# Patient Record
Sex: MALE | Race: WHITE | NOT HISPANIC OR LATINO | Employment: OTHER | URBAN - METROPOLITAN AREA
[De-identification: names, ages, dates, MRNs, and addresses within clinical notes are randomized per-mention and may not be internally consistent; named-entity substitution may affect disease eponyms.]

---

## 2017-03-08 ENCOUNTER — OFFICE VISIT (OUTPATIENT)
Dept: INTERNAL MEDICINE | Facility: CLINIC | Age: 67
End: 2017-03-08

## 2017-03-08 VITALS
BODY MASS INDEX: 35.79 KG/M2 | WEIGHT: 250 LBS | SYSTOLIC BLOOD PRESSURE: 148 MMHG | DIASTOLIC BLOOD PRESSURE: 86 MMHG | RESPIRATION RATE: 18 BRPM | HEART RATE: 73 BPM | OXYGEN SATURATION: 99 % | HEIGHT: 70 IN

## 2017-03-08 DIAGNOSIS — G47.33 OBSTRUCTIVE SLEEP APNEA SYNDROME: ICD-10-CM

## 2017-03-08 DIAGNOSIS — Z12.11 COLON CANCER SCREENING: ICD-10-CM

## 2017-03-08 DIAGNOSIS — Z00.00 MEDICARE ANNUAL WELLNESS VISIT, INITIAL: Primary | ICD-10-CM

## 2017-03-08 DIAGNOSIS — E78.2 MIXED HYPERLIPIDEMIA: ICD-10-CM

## 2017-03-08 DIAGNOSIS — Z11.59 NEED FOR HEPATITIS C SCREENING TEST: ICD-10-CM

## 2017-03-08 DIAGNOSIS — I10 ESSENTIAL HYPERTENSION: ICD-10-CM

## 2017-03-08 PROCEDURE — G0438 PPPS, INITIAL VISIT: HCPCS | Performed by: INTERNAL MEDICINE

## 2017-03-08 PROCEDURE — 99214 OFFICE O/P EST MOD 30 MIN: CPT | Performed by: INTERNAL MEDICINE

## 2017-03-08 RX ORDER — VERAPAMIL HYDROCHLORIDE 100 MG/1
CAPSULE, EXTENDED RELEASE ORAL
COMMUNITY
Start: 2017-02-21 | End: 2017-08-07 | Stop reason: SDUPTHER

## 2017-03-08 RX ORDER — IBUPROFEN 400 MG/1
400 TABLET ORAL EVERY 6 HOURS
COMMUNITY
End: 2018-05-07

## 2017-03-08 RX ORDER — EPINEPHRINE 0.3 MG/.3ML
0.3 INJECTION SUBCUTANEOUS
COMMUNITY
Start: 2015-12-17 | End: 2017-04-19 | Stop reason: SDUPTHER

## 2017-03-08 RX ORDER — VALSARTAN 40 MG/1
40 TABLET ORAL DAILY
Qty: 90 TABLET | Refills: 3 | Status: SHIPPED | OUTPATIENT
Start: 2017-03-08 | End: 2018-01-30 | Stop reason: SDUPTHER

## 2017-03-08 RX ORDER — ATORVASTATIN CALCIUM 20 MG/1
TABLET, FILM COATED ORAL
COMMUNITY
Start: 2016-12-05 | End: 2017-04-19 | Stop reason: SDUPTHER

## 2017-03-08 RX ORDER — RANITIDINE 300 MG/1
300 TABLET ORAL NIGHTLY
COMMUNITY
Start: 2017-01-16 | End: 2020-05-18

## 2017-03-08 RX ORDER — SILDENAFIL 100 MG/1
100 TABLET, FILM COATED ORAL AS NEEDED
COMMUNITY
Start: 2014-09-03

## 2017-03-08 RX ORDER — MULTIVITAMIN
1 CAPSULE ORAL DAILY
COMMUNITY
End: 2018-08-23

## 2017-03-08 RX ORDER — FEXOFENADINE HCL 180 MG/1
180 TABLET ORAL DAILY
COMMUNITY

## 2017-03-08 NOTE — PROGRESS NOTES
"Medicare Annual Wellness Visit    Chief Complaint:  Annual Exam (Initial AWV); Hypertension; and Hyperlipidemia      History of Present Illness    Sachin Almanzar is a 66 y.o. male who presents for an Annual Wellness Visit.  He is also here to establish care.  He was formerly in the [facility] system and his wife Julita is my patient.     C-scope: 12/17/12 due 2017  Dental Exam: 4 months ago.  He has an appt this afternoon  Eye Exam:  Appt in May 2017 scheduled  Exercise: \"not very much\".  He walks a couple of times a week.    Advanced Care Planning:  has an advanced directive - a copy HAS NOT been provided   Immunization History   Administered Date(s) Administered   • Influenza Split High Dose Preservative Free IM 10/02/2015, 09/23/2016   • Pneumococcal Conjugate 13-Valent 07/06/2016   • Pneumococcal Polysaccharide 08/28/2013   • Td, Not Adsorbed 01/01/2002   • Tdap 02/25/2013   • Zoster 12/18/2009     Sachin has a hx of HTN, SVT, HLD, HE (on cpap).  He sees his urologist Dr Muniz q 6 months due to a hx of bladder cancer 5 years ago.  .  He knows he needs to work on his weight.  He had done WW in the past with good success.  He has not tried using My Fitness Pal.  He denies any issues with cp or palp or dizziness.  He does check his bp and has noted that the SBP is in the 140-150 range most of the time.  The only other med he was on other than verapamil was something that gave him a cough.  He was started on verapamil initially due to an instance of SVT that occurred after consuming a half a gallon of coffee in a day.  We reviewed all of his labs from December at [facility].      Review of Systems   Constitutional: Negative for chills, fatigue and fever.   HENT: Positive for hearing loss (mild). Negative for sore throat, tinnitus, trouble swallowing and voice change.    Eyes: Negative for visual disturbance.   Respiratory: Negative for cough and shortness of breath.    Cardiovascular: Positive for leg swelling (he wears " compression socks daily). Negative for chest pain and palpitations.   Gastrointestinal: Negative for abdominal distention, abdominal pain, anal bleeding, blood in stool, constipation, diarrhea, nausea and vomiting.   Endocrine: Negative for polydipsia and polyuria.   Genitourinary: Negative for decreased urine volume, dysuria, flank pain and hematuria.   Musculoskeletal: Negative for arthralgias and myalgias.   Skin: Negative for rash.   Neurological: Negative for dizziness, syncope, light-headedness and headaches.   Hematological: Negative for adenopathy. Does not bruise/bleed easily.   Psychiatric/Behavioral: Negative for confusion and dysphoric mood. The patient is not nervous/anxious.        Past Medical History   Diagnosis Date   • Basal cell carcinoma of skin 7/10/2012     Overview:  NOSE   • Malignant neoplasm of urinary bladder 7/10/2012     Overview:  3/2011, Dr. Muniz, TURBT, BCG and infusion of chemo   • Melanoma in situ of face 2016     Overview:  Removed per Dr. Steen (derm) on 2016       No past surgical history on file.    Social History     Social History   • Marital status:      Spouse name: Julita   • Number of children: 1   • Years of education: N/A     Occupational History   • retired president of [employer]      Social History Main Topics   • Smoking status: Never Smoker   • Smokeless tobacco: Not on file   • Alcohol use Yes      Comment: 2-3 beers 5 days a week   • Drug use: No   • Sexual activity: Not on file     Other Topics Concern   • Not on file     Social History Narrative   • No narrative on file       Family History   Problem Relation Age of Onset   • Heart attack Mother       age 55   • Cancer Father       at age 44   • Diabetes Maternal Grandmother    • Hypertension Paternal Uncle        Allergies   Allergen Reactions   • Ace Inhibitors    • Iodine Nausea And Vomiting   • Shellfish-Derived Products Hives     Throat swelling, itching       No current outpatient  "prescriptions on file prior to visit.     No current facility-administered medications on file prior to visit.        Patient Active Problem List   Diagnosis   • Atopic rhinitis   • Impotence of organic origin   • Gastroesophageal reflux disease   • Transient acantholytic dermatosis   • History of colonic polyps   • Hyperlipidemia   • Hypertension   • Calculus of kidney   • Adult body mass index greater than 30   • Obstructive sleep apnea syndrome   • Supraventricular tachycardia   • Venous insufficiency of lower extremity       Health Risk Assessment/Depression Screen/Functional and Cognitive Screening:   The patient has completed a Health Risk Assessment & Depression screen. These have been reviewed with them and have been scanned as a separate documents.    Age-appropriate Screening Schedule:  Refer to the list below for future screening recommendations based on patient's age. Orders for these recommended tests are listed in the plan section. The patient has been provided with a written plan.     Vitals:    03/08/17 0830   BP: 148/86   Pulse: 73   Resp: 18   SpO2: 99%   Weight: 250 lb (113 kg)   Height: 70\" (177.8 cm)       Body mass index is 35.87 kg/(m^2).    Physical Exam    No results found for this or any previous visit.    Assessment & Plan:    Diagnoses and all orders for this visit:    Medicare annual wellness visit, initial    Colon cancer screening  -     Ambulatory Referral to General Surgery    Need for hepatitis C screening test  -     Hepatitis C Antibody    Mixed hyperlipidemia    Essential hypertension    Obstructive sleep apnea syndrome    Other orders  -     atorvastatin (LIPITOR) 20 MG tablet;   -     verapamil (VERELAN) 100 MG 24 hr capsule;   -     raNITIdine (ZANTAC) 300 MG tablet;   -     Multiple Vitamin (MULTIVITAMIN) capsule; Take 1 capsule by mouth.  -     ibuprofen (ADVIL,MOTRIN) 400 MG tablet; Take 400 mg by mouth Every 6 (Six) Hours.  -     fexofenadine (ALLEGRA) 180 MG tablet; Take " 180 mg by mouth.  -     EPINEPHrine (EPIPEN 2-ANALI) 0.3 MG/0.3ML solution auto-injector injection; Inject 0.3 mg into the shoulder, thigh, or buttocks.  -     sildenafil (VIAGRA) 100 MG tablet;   -     valsartan (DIOVAN) 40 MG tablet; Take 1 tablet by mouth Daily.      Discussion/Summary  Sachin is a very nice 67 y/o patient here to establish care.  I cannot see from his [place] records that he has had an AWV ever, so this will be his initial.  He is very well up to date on health maintenance.  His lipids are well controlled.  His bp needs adjusting.  I have added diovan 40 mg today.  He is going to make a good effort to get his weight under control.  He is going to try My Fitness Pal or consider going back to WW.  We set a ten lb weight loss goal for when he sees me back in 6 weeks.  He will be due at the end of this year for c-scope.  Referral placed.      Follow Up:  Return in about 6 weeks (around 4/19/2017).     An After Visit Summary and PPPS with all of these plans were given to the patient.     Current Outpatient Prescriptions:   •  atorvastatin (LIPITOR) 20 MG tablet, , Disp: , Rfl:   •  EPINEPHrine (EPIPEN 2-ANALI) 0.3 MG/0.3ML solution auto-injector injection, Inject 0.3 mg into the shoulder, thigh, or buttocks., Disp: , Rfl:   •  fexofenadine (ALLEGRA) 180 MG tablet, Take 180 mg by mouth., Disp: , Rfl:   •  ibuprofen (ADVIL,MOTRIN) 400 MG tablet, Take 400 mg by mouth Every 6 (Six) Hours., Disp: , Rfl:   •  Multiple Vitamin (MULTIVITAMIN) capsule, Take 1 capsule by mouth., Disp: , Rfl:   •  raNITIdine (ZANTAC) 300 MG tablet, , Disp: , Rfl:   •  sildenafil (VIAGRA) 100 MG tablet, , Disp: , Rfl:   •  verapamil (VERELAN) 100 MG 24 hr capsule, , Disp: , Rfl:   •  valsartan (DIOVAN) 40 MG tablet, Take 1 tablet by mouth Daily., Disp: 90 tablet, Rfl: 3

## 2017-03-09 ENCOUNTER — TELEPHONE (OUTPATIENT)
Dept: INTERNAL MEDICINE | Facility: CLINIC | Age: 67
End: 2017-03-09

## 2017-03-09 LAB — HCV AB S/CO SERPL IA: 0.2 S/CO RATIO (ref 0–0.9)

## 2017-03-09 NOTE — TELEPHONE ENCOUNTER
----- Message from Hali Potts MD sent at 3/9/2017  9:35 AM EST -----  Please call - his hep c screening is neg

## 2017-04-19 ENCOUNTER — OFFICE VISIT (OUTPATIENT)
Dept: INTERNAL MEDICINE | Facility: CLINIC | Age: 67
End: 2017-04-19

## 2017-04-19 VITALS
SYSTOLIC BLOOD PRESSURE: 132 MMHG | WEIGHT: 248 LBS | BODY MASS INDEX: 35.5 KG/M2 | DIASTOLIC BLOOD PRESSURE: 74 MMHG | RESPIRATION RATE: 18 BRPM | HEIGHT: 70 IN | HEART RATE: 80 BPM | OXYGEN SATURATION: 99 %

## 2017-04-19 DIAGNOSIS — I10 ESSENTIAL HYPERTENSION: Primary | ICD-10-CM

## 2017-04-19 DIAGNOSIS — N20.0 CALCULUS OF KIDNEY: ICD-10-CM

## 2017-04-19 DIAGNOSIS — IMO0002 ADULT BODY MASS INDEX GREATER THAN 30: ICD-10-CM

## 2017-04-19 DIAGNOSIS — Z91.013 ALLERGY TO SHELLFISH: ICD-10-CM

## 2017-04-19 LAB
BUN SERPL-MCNC: 13 MG/DL (ref 8–23)
BUN/CREAT SERPL: 13.3 (ref 7–25)
CALCIUM SERPL-MCNC: 9.7 MG/DL (ref 8.6–10.5)
CHLORIDE SERPL-SCNC: 101 MMOL/L (ref 98–107)
CO2 SERPL-SCNC: 24.7 MMOL/L (ref 22–29)
CREAT SERPL-MCNC: 0.98 MG/DL (ref 0.76–1.27)
GLUCOSE SERPL-MCNC: 98 MG/DL (ref 65–99)
POTASSIUM SERPL-SCNC: 4.6 MMOL/L (ref 3.5–5.2)
SODIUM SERPL-SCNC: 141 MMOL/L (ref 136–145)

## 2017-04-19 PROCEDURE — 99214 OFFICE O/P EST MOD 30 MIN: CPT | Performed by: INTERNAL MEDICINE

## 2017-04-19 RX ORDER — EPINEPHRINE 0.3 MG/.3ML
0.3 INJECTION SUBCUTANEOUS ONCE
Qty: 1 EACH | Refills: 3 | Status: SHIPPED | OUTPATIENT
Start: 2017-04-19 | End: 2017-04-19

## 2017-04-19 RX ORDER — ATORVASTATIN CALCIUM 20 MG/1
20 TABLET, FILM COATED ORAL NIGHTLY
Qty: 90 TABLET | Refills: 3 | Status: SHIPPED | OUTPATIENT
Start: 2017-04-19 | End: 2018-04-16 | Stop reason: SDUPTHER

## 2017-04-19 NOTE — PROGRESS NOTES
Chief Complaint  Sachin Almanzar is a 66 y.o. male who presents for Weight Loss; Follow-up; and Hypertension (Diovan started at last appt)  .    History of Present Illness   HTN - No cp or palp or dizziness or soa.  He has been checking his bp daily with a wrist cuff.  His SBP is in the 130's.  DBP in the 70's.      Obesity - He is using My Fitness Pal.  He really likes it.  It's been very eye opening.  He initially lost 5 lbs.  He does have a few beers at night which is pushing his total daily calories over the 1500 karley limit.  He knows he needs to decrease this.  We discussed that this will also help his bp.      HLD - he needs a refill on his [facility]      Kidney stones - he is following with Dr. Muniz. He is pretty sure these are calcium oxalate stones.  He has not been given a dietary handout on these stones.      He needs a refill on his Epi pen for his shellfish allergy.  Wants to know if I can send this in.  Thankfully, he has never needed to use the epi-pen.       Review of Systems   Constitutional: Negative for fatigue and unexpected weight change.   Respiratory: Negative for chest tightness and shortness of breath.    Cardiovascular: Negative for chest pain, palpitations and leg swelling.   Allergic/Immunologic: Positive for food allergies (shellfish).   Neurological: Negative for dizziness and light-headedness.       Patient Active Problem List   Diagnosis   • Atopic rhinitis   • Impotence of organic origin   • Gastroesophageal reflux disease   • Transient acantholytic dermatosis   • History of colonic polyps   • Hyperlipidemia   • Hypertension   • Calculus of kidney   • Adult body mass index greater than 30   • Obstructive sleep apnea syndrome   • Supraventricular tachycardia   • Venous insufficiency of lower extremity   • Allergy to shellfish       Past Medical History:   Diagnosis Date   • Basal cell carcinoma of skin 7/10/2012    Overview:  NOSE   • Malignant neoplasm  of urinary bladder 7/10/2012    Overview:  3/2011, Dr. Muniz, TURBT, BCG and infusion of chemo   • Melanoma in situ of face 2016    Overview:  Removed per Dr. Steen (derm) on 2016       Past Surgical History:   Procedure Laterality Date   • APPENDECTOMY     • COSMETIC SURGERY      Nose - melanoma removal   • TONSILLECTOMY         Family History   Problem Relation Age of Onset   • Heart attack Mother       age 55   • Cancer Father       at age 44   • Diabetes Maternal Grandmother    • Hypertension Paternal Uncle        Social History     Social History   • Marital status:      Spouse name: Julita   • Number of children: 1   • Years of education: N/A     Occupational History   • retired president of [employer]      Social History Main Topics   • Smoking status: Never Smoker   • Smokeless tobacco: Not on file   • Alcohol use Yes      Comment: 2-3 beers 5 days a week   • Drug use: No   • Sexual activity: Not on file     Other Topics Concern   • Not on file     Social History Narrative   • No narrative on file       Current Outpatient Prescriptions on File Prior to Visit   Medication Sig Dispense Refill   • fexofenadine (ALLEGRA) 180 MG tablet Take 180 mg by mouth.     • ibuprofen (ADVIL,MOTRIN) 400 MG tablet Take 400 mg by mouth Every 6 (Six) Hours.     • Multiple Vitamin (MULTIVITAMIN) capsule Take 1 capsule by mouth.     • raNITIdine (ZANTAC) 300 MG tablet      • sildenafil (VIAGRA) 100 MG tablet      • valsartan (DIOVAN) 40 MG tablet Take 1 tablet by mouth Daily. 90 tablet 3   • verapamil (VERELAN) 100 MG 24 hr capsule      • [DISCONTINUED] atorvastatin (LIPITOR) 20 MG tablet      • [DISCONTINUED] EPINEPHrine (EPIPEN 2-ANALI) 0.3 MG/0.3ML solution auto-injector injection Inject 0.3 mg into the shoulder, thigh, or buttocks.       No current facility-administered medications on file prior to visit.        Allergies   Allergen Reactions   • Ace Inhibitors    • Iodine Nausea And Vomiting   •  "Shellfish-Derived Products Hives     Throat swelling, itching       Vitals:    04/19/17 1041   BP: 132/74   Pulse: 80   Resp: 18   SpO2: 99%   Weight: 248 lb (112 kg)   Height: 70\" (177.8 cm)       Body mass index is 35.58 kg/(m^2).    Objective   Physical Exam   Constitutional: He is oriented to person, place, and time. He appears well-developed and well-nourished. No distress.   HENT:   Head: Normocephalic and atraumatic.   Eyes: Conjunctivae are normal. No scleral icterus.   Neck: Normal range of motion. Neck supple.   Cardiovascular: Normal rate, regular rhythm and normal heart sounds.  Exam reveals no gallop and no friction rub.    No murmur heard.  Pulmonary/Chest: Effort normal and breath sounds normal. No respiratory distress. He has no wheezes. He has no rales.   Musculoskeletal: Normal range of motion. He exhibits no edema.   Lymphadenopathy:     He has no cervical adenopathy.   Neurological: He is alert and oriented to person, place, and time. No cranial nerve deficit.   Psychiatric: He has a normal mood and affect. His behavior is normal. Judgment and thought content normal.       Results for orders placed or performed in visit on 03/08/17   Hepatitis C Antibody   Result Value Ref Range    Hep C Virus Ab 0.2 0.0 - 0.9 s/co ratio       Assessment/Plan   Diagnoses and all orders for this visit:    Essential hypertension  -     Basic Metabolic Panel    Adult body mass index greater than 30    Calculus of kidney    Allergy to shellfish    Other orders  -     atorvastatin (LIPITOR) 20 MG tablet; Take 1 tablet by mouth Every Night.  -     EPINEPHrine (EPIPEN 2-ANALI) 0.3 MG/0.3ML solution auto-injector injection; Inject 0.3 mL into the shoulder, thigh, or buttocks 1 (One) Time for 1 dose.        Discussion/Summary  Sachin is here for follow up from his last visit.  We started him on diovan which has certainly helped his bp.  Check BMP today.  I have encouraged him to continue using My Fitness Pal and to really " reduce the alcohol.  I have also given him our handout on dietary guidelines for reducing risk for kidney stones.  [facility]   Return in about 4 months (around 8/19/2017) for Next scheduled follow up, with nonfasting labs prior.

## 2017-04-20 ENCOUNTER — TELEPHONE (OUTPATIENT)
Dept: INTERNAL MEDICINE | Facility: CLINIC | Age: 67
End: 2017-04-20

## 2017-08-07 ENCOUNTER — TELEPHONE (OUTPATIENT)
Dept: INTERNAL MEDICINE | Facility: CLINIC | Age: 67
End: 2017-08-07

## 2017-08-07 RX ORDER — VERAPAMIL HYDROCHLORIDE 100 MG/1
200 CAPSULE, EXTENDED RELEASE ORAL 2 TIMES DAILY
Qty: 360 CAPSULE | Refills: 3 | Status: SHIPPED | OUTPATIENT
Start: 2017-08-07 | End: 2017-08-14 | Stop reason: SDUPTHER

## 2017-08-07 NOTE — TELEPHONE ENCOUNTER
Pt sent [employer] message requesting refill. Had not had this refilled by  yet, he is a new pt, but has been seen by [facility], just has not needed a refill yet since becoming established.

## 2017-08-14 RX ORDER — VERAPAMIL HYDROCHLORIDE 100 MG/1
200 CAPSULE, EXTENDED RELEASE ORAL 2 TIMES DAILY
Qty: 56 CAPSULE | Refills: 0 | Status: SHIPPED | OUTPATIENT
Start: 2017-08-14 | End: 2018-07-22 | Stop reason: HOSPADM

## 2017-08-14 NOTE — TELEPHONE ENCOUNTER
Pt needs 14 day supply of verapamil, pt will run out before he gets his mail order supply delivered.

## 2017-10-12 ENCOUNTER — FLU SHOT (OUTPATIENT)
Dept: INTERNAL MEDICINE | Facility: CLINIC | Age: 67
End: 2017-10-12

## 2017-10-12 PROCEDURE — 90662 IIV NO PRSV INCREASED AG IM: CPT | Performed by: INTERNAL MEDICINE

## 2017-10-12 PROCEDURE — G0008 ADMIN INFLUENZA VIRUS VAC: HCPCS | Performed by: INTERNAL MEDICINE

## 2017-10-16 ENCOUNTER — PREP FOR SURGERY (OUTPATIENT)
Dept: OTHER | Facility: HOSPITAL | Age: 67
End: 2017-10-16

## 2017-10-16 DIAGNOSIS — Z86.010 HISTORY OF COLON POLYPS: Primary | ICD-10-CM

## 2017-10-23 DIAGNOSIS — E78.2 MIXED HYPERLIPIDEMIA: Primary | ICD-10-CM

## 2017-10-23 DIAGNOSIS — I10 ESSENTIAL HYPERTENSION: ICD-10-CM

## 2017-10-24 PROBLEM — Z86.0100 HISTORY OF COLON POLYPS: Status: ACTIVE | Noted: 2017-10-24

## 2017-10-24 PROBLEM — Z86.010 HISTORY OF COLON POLYPS: Status: ACTIVE | Noted: 2017-10-24

## 2017-10-25 LAB
ALBUMIN SERPL-MCNC: 4.1 G/DL (ref 3.5–5.2)
ALBUMIN/GLOB SERPL: 1.4 G/DL
ALP SERPL-CCNC: 58 U/L (ref 39–117)
ALT SERPL-CCNC: 33 U/L (ref 1–41)
AST SERPL-CCNC: 26 U/L (ref 1–40)
BASOPHILS # BLD AUTO: 0.03 10*3/MM3 (ref 0–0.2)
BASOPHILS NFR BLD AUTO: 0.5 % (ref 0–1.5)
BILIRUB SERPL-MCNC: 0.6 MG/DL (ref 0.1–1.2)
BUN SERPL-MCNC: 18 MG/DL (ref 8–23)
BUN/CREAT SERPL: 16.7 (ref 7–25)
CALCIUM SERPL-MCNC: 10.6 MG/DL (ref 8.6–10.5)
CHLORIDE SERPL-SCNC: 103 MMOL/L (ref 98–107)
CO2 SERPL-SCNC: 26.4 MMOL/L (ref 22–29)
CREAT SERPL-MCNC: 1.08 MG/DL (ref 0.76–1.27)
EOSINOPHIL # BLD AUTO: 0.19 10*3/MM3 (ref 0–0.7)
EOSINOPHIL NFR BLD AUTO: 3.4 % (ref 0.3–6.2)
ERYTHROCYTE [DISTWIDTH] IN BLOOD BY AUTOMATED COUNT: 13.8 % (ref 11.5–14.5)
GFR SERPLBLD CREATININE-BSD FMLA CKD-EPI: 68 ML/MIN/1.73
GFR SERPLBLD CREATININE-BSD FMLA CKD-EPI: 82 ML/MIN/1.73
GLOBULIN SER CALC-MCNC: 3 GM/DL
GLUCOSE SERPL-MCNC: 143 MG/DL (ref 65–99)
HCT VFR BLD AUTO: 41.9 % (ref 40.4–52.2)
HGB BLD-MCNC: 14.8 G/DL (ref 13.7–17.6)
IMM GRANULOCYTES # BLD: 0.04 10*3/MM3 (ref 0–0.03)
IMM GRANULOCYTES NFR BLD: 0.7 % (ref 0–0.5)
LYMPHOCYTES # BLD AUTO: 0.85 10*3/MM3 (ref 0.9–4.8)
LYMPHOCYTES NFR BLD AUTO: 15.2 % (ref 19.6–45.3)
MCH RBC QN AUTO: 33.3 PG (ref 27–32.7)
MCHC RBC AUTO-ENTMCNC: 35.3 G/DL (ref 32.6–36.4)
MCV RBC AUTO: 94.2 FL (ref 79.8–96.2)
MONOCYTES # BLD AUTO: 0.7 10*3/MM3 (ref 0.2–1.2)
MONOCYTES NFR BLD AUTO: 12.5 % (ref 5–12)
NEUTROPHILS # BLD AUTO: 3.8 10*3/MM3 (ref 1.9–8.1)
NEUTROPHILS NFR BLD AUTO: 67.7 % (ref 42.7–76)
PLATELET # BLD AUTO: 219 10*3/MM3 (ref 140–500)
POTASSIUM SERPL-SCNC: 4.6 MMOL/L (ref 3.5–5.2)
PROT SERPL-MCNC: 7.1 G/DL (ref 6–8.5)
RBC # BLD AUTO: 4.45 10*6/MM3 (ref 4.6–6)
SODIUM SERPL-SCNC: 142 MMOL/L (ref 136–145)
WBC # BLD AUTO: 5.61 10*3/MM3 (ref 4.5–10.7)

## 2017-10-30 ENCOUNTER — OFFICE VISIT (OUTPATIENT)
Dept: INTERNAL MEDICINE | Facility: CLINIC | Age: 67
End: 2017-10-30

## 2017-10-30 VITALS
HEIGHT: 70 IN | HEART RATE: 92 BPM | DIASTOLIC BLOOD PRESSURE: 76 MMHG | WEIGHT: 240 LBS | OXYGEN SATURATION: 98 % | BODY MASS INDEX: 34.36 KG/M2 | RESPIRATION RATE: 18 BRPM | SYSTOLIC BLOOD PRESSURE: 130 MMHG

## 2017-10-30 DIAGNOSIS — R73.9 ELEVATED SERUM GLUCOSE: ICD-10-CM

## 2017-10-30 DIAGNOSIS — I10 ESSENTIAL HYPERTENSION: Primary | ICD-10-CM

## 2017-10-30 DIAGNOSIS — E78.2 MIXED HYPERLIPIDEMIA: ICD-10-CM

## 2017-10-30 LAB
Lab: NORMAL
Lab: NORMAL

## 2017-10-30 PROCEDURE — 99214 OFFICE O/P EST MOD 30 MIN: CPT | Performed by: INTERNAL MEDICINE

## 2017-10-30 RX ORDER — ASPIRIN 81 MG/1
81 TABLET ORAL DAILY
COMMUNITY
End: 2019-11-15

## 2017-10-30 NOTE — PROGRESS NOTES
Chief Complaint  Sachin Almanzar is a 67 y.o. male who presents for Hypertension; Hyperlipidemia; and Follow-up (4 month)  .    History of Present Illness   Sachin is here for routine follow up on HTN and HLD.  He has stayed pretty busy.  He is really hoping to get his weight down more.  He is ten lbs down from March.  No cp or palp or dizziness or soa or edema.  No myalgias.  He wears compression socks.  He isn't getting much exercise.  He was surprised to see the glucose number on his labs.  It hasn't been that high in the past.  His last A1c at [facility] was 5.3 in 12/2016.      He got a flu shot already.  He is set up for c-scope in December.        Review of Systems   Constitution: Positive for weight loss. Negative for weakness and malaise/fatigue.   Eyes: Negative for blurred vision.   Cardiovascular: Negative for chest pain, dyspnea on exertion, leg swelling, orthopnea, palpitations and paroxysmal nocturnal dyspnea.   Respiratory: Negative for shortness of breath.    Musculoskeletal: Negative for myalgias and neck pain.   Neurological: Negative for dizziness, headaches and light-headedness.       Patient Active Problem List   Diagnosis   • Atopic rhinitis   • Impotence of organic origin   • Gastroesophageal reflux disease   • Transient acantholytic dermatosis   • History of colonic polyps   • Hyperlipidemia   • Hypertension   • Calculus of kidney   • Adult body mass index greater than 30   • Obstructive sleep apnea syndrome   • Supraventricular tachycardia   • Venous insufficiency of lower extremity   • Allergy to shellfish   • History of colon polyps   • Elevated serum glucose       Past Medical History:   Diagnosis Date   • Basal cell carcinoma of skin 7/10/2012    Overview:  NOSE   • Malignant neoplasm of urinary bladder 7/10/2012    Overview:  3/2011, Dr. Muniz, TURBT, BCG and infusion of chemo   • Melanoma in situ of face 6/14/2016    Overview:  Removed per Dr. Steen (derm) on 6/13/2016       Past  "Surgical History:   Procedure Laterality Date   • APPENDECTOMY     • COSMETIC SURGERY      Nose - melanoma removal   • TONSILLECTOMY         Family History   Problem Relation Age of Onset   • Heart attack Mother       age 55   • Cancer Father       at age 44   • Diabetes Maternal Grandmother    • Hypertension Paternal Uncle        Social History     Social History   • Marital status:      Spouse name: Julita   • Number of children: 1   • Years of education: N/A     Occupational History   • retired president of [employer]      Social History Main Topics   • Smoking status: Never Smoker   • Smokeless tobacco: Not on file   • Alcohol use Yes      Comment: 2-3 beers 5 days a week   • Drug use: No   • Sexual activity: Not on file     Other Topics Concern   • Not on file     Social History Narrative   • No narrative on file       Current Outpatient Prescriptions on File Prior to Visit   Medication Sig Dispense Refill   • atorvastatin (LIPITOR) 20 MG tablet Take 1 tablet by mouth Every Night. 90 tablet 3   • fexofenadine (ALLEGRA) 180 MG tablet Take 180 mg by mouth.     • ibuprofen (ADVIL,MOTRIN) 400 MG tablet Take 400 mg by mouth Every 6 (Six) Hours.     • Multiple Vitamin (MULTIVITAMIN) capsule Take 1 capsule by mouth.     • raNITIdine (ZANTAC) 300 MG tablet      • sildenafil (VIAGRA) 100 MG tablet      • valsartan (DIOVAN) 40 MG tablet Take 1 tablet by mouth Daily. 90 tablet 3   • verapamil (VERELAN) 100 MG 24 hr capsule Take 2 capsules by mouth 2 (Two) Times a Day. 56 capsule 0     No current facility-administered medications on file prior to visit.        Allergies   Allergen Reactions   • Ace Inhibitors    • Iodine Nausea And Vomiting   • Shellfish-Derived Products Hives     Throat swelling, itching       Vitals:    10/30/17 1031   BP: 130/76   Pulse: 92   Resp: 18   SpO2: 98%   Weight: 240 lb (109 kg)   Height: 70\" (177.8 cm)       Body mass index is 34.44 kg/(m^2).    Objective   Physical Exam "   Constitutional: He is oriented to person, place, and time. He appears well-developed and well-nourished. No distress.   HENT:   Head: Normocephalic and atraumatic.   Eyes: Conjunctivae are normal. No scleral icterus.   Neck: Normal range of motion. Neck supple.   Cardiovascular: Normal rate, regular rhythm and normal heart sounds.  Exam reveals no gallop and no friction rub.    No murmur heard.  Pulmonary/Chest: Effort normal and breath sounds normal. No respiratory distress. He has no wheezes. He has no rales.   Musculoskeletal: Normal range of motion. He exhibits no edema.   Lymphadenopathy:     He has no cervical adenopathy.   Neurological: He is alert and oriented to person, place, and time. No cranial nerve deficit.   Psychiatric: He has a normal mood and affect. His behavior is normal. Judgment and thought content normal.       Results for orders placed or performed in visit on 10/23/17   Comprehensive Metabolic Panel   Result Value Ref Range    Glucose 143 (H) 65 - 99 mg/dL    BUN 18 8 - 23 mg/dL    Creatinine 1.08 0.76 - 1.27 mg/dL    eGFR Non African Am 68 >60 mL/min/1.73    eGFR African Am 82 >60 mL/min/1.73    BUN/Creatinine Ratio 16.7 7.0 - 25.0    Sodium 142 136 - 145 mmol/L    Potassium 4.6 3.5 - 5.2 mmol/L    Chloride 103 98 - 107 mmol/L    Total CO2 26.4 22.0 - 29.0 mmol/L    Calcium 10.6 (H) 8.6 - 10.5 mg/dL    Total Protein 7.1 6.0 - 8.5 g/dL    Albumin 4.10 3.50 - 5.20 g/dL    Globulin 3.0 gm/dL    A/G Ratio 1.4 g/dL    Total Bilirubin 0.6 0.1 - 1.2 mg/dL    Alkaline Phosphatase 58 39 - 117 U/L    AST (SGOT) 26 1 - 40 U/L    ALT (SGPT) 33 1 - 41 U/L   CBC & Differential   Result Value Ref Range    WBC 5.61 4.50 - 10.70 10*3/mm3    RBC 4.45 (L) 4.60 - 6.00 10*6/mm3    Hemoglobin 14.8 13.7 - 17.6 g/dL    Hematocrit 41.9 40.4 - 52.2 %    MCV 94.2 79.8 - 96.2 fL    MCH 33.3 (H) 27.0 - 32.7 pg    MCHC 35.3 32.6 - 36.4 g/dL    RDW 13.8 11.5 - 14.5 %    Platelets 219 140 - 500 10*3/mm3    Neutrophil  Rel % 67.7 42.7 - 76.0 %    Lymphocyte Rel % 15.2 (L) 19.6 - 45.3 %    Monocyte Rel % 12.5 (H) 5.0 - 12.0 %    Eosinophil Rel % 3.4 0.3 - 6.2 %    Basophil Rel % 0.5 0.0 - 1.5 %    Neutrophils Absolute 3.80 1.90 - 8.10 10*3/mm3    Lymphocytes Absolute 0.85 (L) 0.90 - 4.80 10*3/mm3    Monocytes Absolute 0.70 0.20 - 1.20 10*3/mm3    Eosinophils Absolute 0.19 0.00 - 0.70 10*3/mm3    Basophils Absolute 0.03 0.00 - 0.20 10*3/mm3    Immature Granulocyte Rel % 0.7 (H) 0.0 - 0.5 %    Immature Grans Absolute 0.04 (H) 0.00 - 0.03 10*3/mm3       Assessment/Plan   Diagnoses and all orders for this visit:    Essential hypertension    Mixed hyperlipidemia    Elevated serum glucose    Other orders  -     aspirin 81 MG EC tablet; Take 81 mg by mouth Daily.        Discussion/Summary  Sachin is here for routine follow up.  He is doing well.  His bp is controlled.  His blood counts are stable. We compared them to previous labs from [facility].  His glucose is elevated, but he was not fasting.  We are going to have [facility] add an A1c onto his labs.  Advised to continue to work on his weight and his exercise.    No Follow-up on file.

## 2017-10-31 LAB
HBA1C MFR BLD: 5 % (ref 4.8–5.6)
WRITTEN AUTHORIZATION: NORMAL

## 2017-12-12 ENCOUNTER — ANESTHESIA EVENT (OUTPATIENT)
Dept: GASTROENTEROLOGY | Facility: HOSPITAL | Age: 67
End: 2017-12-12

## 2017-12-12 ENCOUNTER — HOSPITAL ENCOUNTER (OUTPATIENT)
Facility: HOSPITAL | Age: 67
Setting detail: HOSPITAL OUTPATIENT SURGERY
Discharge: HOME | End: 2017-12-12
Attending: SURGERY | Admitting: SURGERY

## 2017-12-12 ENCOUNTER — ANESTHESIA (OUTPATIENT)
Dept: GASTROENTEROLOGY | Facility: HOSPITAL | Age: 67
End: 2017-12-12

## 2017-12-12 VITALS
RESPIRATION RATE: 16 BRPM | OXYGEN SATURATION: 98 % | HEIGHT: 70 IN | DIASTOLIC BLOOD PRESSURE: 93 MMHG | SYSTOLIC BLOOD PRESSURE: 159 MMHG | HEART RATE: 84 BPM | WEIGHT: 239 LBS | BODY MASS INDEX: 34.22 KG/M2 | TEMPERATURE: 97.9 F

## 2017-12-12 DIAGNOSIS — Z86.010 HISTORY OF COLON POLYPS: ICD-10-CM

## 2017-12-12 PROCEDURE — 88305 TISSUE EXAM BY PATHOLOGIST: CPT | Performed by: SURGERY

## 2017-12-12 PROCEDURE — 45380 COLONOSCOPY AND BIOPSY: CPT | Performed by: SURGERY

## 2017-12-12 PROCEDURE — S0260 H&P FOR SURGERY: HCPCS | Performed by: SURGERY

## 2017-12-12 PROCEDURE — 25010000002 MIDAZOLAM PER 1 MG: Performed by: ANESTHESIOLOGY

## 2017-12-12 PROCEDURE — 25010000002 PROPOFOL 10 MG/ML EMULSION: Performed by: ANESTHESIOLOGY

## 2017-12-12 RX ORDER — SODIUM CHLORIDE 0.9 % (FLUSH) 0.9 %
3 SYRINGE (ML) INJECTION AS NEEDED
Status: DISCONTINUED | OUTPATIENT
Start: 2017-12-12 | End: 2017-12-12 | Stop reason: HOSPADM

## 2017-12-12 RX ORDER — PROPOFOL 10 MG/ML
VIAL (ML) INTRAVENOUS CONTINUOUS PRN
Status: DISCONTINUED | OUTPATIENT
Start: 2017-12-12 | End: 2017-12-12 | Stop reason: SURG

## 2017-12-12 RX ORDER — LIDOCAINE HYDROCHLORIDE 20 MG/ML
INJECTION, SOLUTION INFILTRATION; PERINEURAL AS NEEDED
Status: DISCONTINUED | OUTPATIENT
Start: 2017-12-12 | End: 2017-12-12 | Stop reason: SURG

## 2017-12-12 RX ORDER — PROPOFOL 10 MG/ML
VIAL (ML) INTRAVENOUS AS NEEDED
Status: DISCONTINUED | OUTPATIENT
Start: 2017-12-12 | End: 2017-12-12 | Stop reason: SURG

## 2017-12-12 RX ORDER — SODIUM CHLORIDE, SODIUM LACTATE, POTASSIUM CHLORIDE, CALCIUM CHLORIDE 600; 310; 30; 20 MG/100ML; MG/100ML; MG/100ML; MG/100ML
1000 INJECTION, SOLUTION INTRAVENOUS CONTINUOUS PRN
Status: DISCONTINUED | OUTPATIENT
Start: 2017-12-12 | End: 2017-12-12 | Stop reason: HOSPADM

## 2017-12-12 RX ORDER — MIDAZOLAM HYDROCHLORIDE 1 MG/ML
INJECTION INTRAMUSCULAR; INTRAVENOUS AS NEEDED
Status: DISCONTINUED | OUTPATIENT
Start: 2017-12-12 | End: 2017-12-12 | Stop reason: SURG

## 2017-12-12 RX ADMIN — SODIUM CHLORIDE, POTASSIUM CHLORIDE, SODIUM LACTATE AND CALCIUM CHLORIDE 1000 ML: 600; 310; 30; 20 INJECTION, SOLUTION INTRAVENOUS at 12:12

## 2017-12-12 RX ADMIN — Medication 1 MG: at 12:27

## 2017-12-12 RX ADMIN — PROPOFOL 100 MG: 10 INJECTION, EMULSION INTRAVENOUS at 12:28

## 2017-12-12 RX ADMIN — LIDOCAINE HYDROCHLORIDE 60 MG: 20 INJECTION, SOLUTION INFILTRATION; PERINEURAL at 12:28

## 2017-12-12 RX ADMIN — ALFENTANIL HYDROCHLORIDE 250 MCG: 500 INJECTION, SOLUTION INTRAVENOUS at 12:27

## 2017-12-12 RX ADMIN — PROPOFOL 100 MCG/KG/MIN: 10 INJECTION, EMULSION INTRAVENOUS at 12:28

## 2017-12-12 NOTE — OP NOTE
Operative Note :   Domingo Cantrell MD    Sachin Guzmanberry  1950    Procedure Date: 12/12/17    Pre-op Diagnosis:  · Screening    Post-op Diagnosis:  · Colon polyp    Procedure:   · Colonoscopy with cold biopsy forceps    Surgeon: Domingo Cantrell MD      Anesthesia: MAC    Indications:  · 67 year old for screening.    Findings:   · Polyp proximal ascending colon    Recommendations:   · Based on polyp pathology, likely in 5 years    Description of procedure:    After obtaining informed consent, patient was brought to the endoscopy suite and was sedated.  Digital rectal exam was undertaken and was unremarkable.  The flexible Olympus endoscope was inserted into the rectum and passed around under direct visualization with minimal difficulty to the level of the cecum.  The colonoscope was then slowly withdrawn, carefully visualizing all mucosal surfaces.  The cecum was unremarkable.  In the proximal ascending colon was a single benign-appearing polyp on a fold which was removed with the polypectomy forceps.  The remainder of the ascending colon, hepatic flexure, transverse colon, splenic flexure, descending colon and sigmoid colon were normal.  The rectum was unremarkable.  There were no diverticula noted.  No other mass lesions, stenoses or mucosal abnormalities.  The scope was removed completely.  Patient tolerated the procedure well.    Domingo Cantrell MD  General and Endoscopic Surgery  [facility]    [address], [address]  [place], [place], [address]  P: [phone]  F: [phone]

## 2017-12-12 NOTE — ANESTHESIA POSTPROCEDURE EVALUATION
"Patient: Sachin Almanzar    Procedure Summary     Date Anesthesia Start Anesthesia Stop Room / Location    12/12/17 1222 1253  [facility]       Procedure Diagnosis Surgeon Provider    COLONOSCOPY INTO CECUM WITH COLD BX POLYPECTOMY  (N/A ) History of colon polyps  (History of colon polyps [Z86.010]) MD Junito Pimentel MD          Anesthesia Type: MAC  Last vitals  BP   (!) 149/105 (12/12/17 1158)   Temp   37.2 °C (98.9 °F) (12/12/17 1158)   Pulse   82 (12/12/17 1158)   Resp   16 (12/12/17 1158)     SpO2   99 % (12/12/17 1158)     Post Anesthesia Care and Evaluation    Patient location during evaluation: bedside  Patient participation: complete - patient participated  Level of consciousness: awake and alert  Pain management: adequate  Airway patency: patent  Anesthetic complications: No anesthetic complications    Cardiovascular status: acceptable  Respiratory status: acceptable  Hydration status: acceptable    Comments: BP (!) 149/105 (BP Location: Left arm, Patient Position: Sitting)  Pulse 82  Temp 37.2 °C (98.9 °F) (Oral)   Resp 16  Ht 177.8 cm (70\")  Wt 108 kg (239 lb)  SpO2 99%  BMI 34.29 kg/m2      "

## 2017-12-12 NOTE — ANESTHESIA PREPROCEDURE EVALUATION
Anesthesia Evaluation     Patient summary reviewed and Nursing notes reviewed   no history of anesthetic complications:  NPO Solid Status: > 8 hours  NPO Liquid Status: > 8 hours     Airway   Mallampati: III  Dental      Pulmonary - normal exam   (+) sleep apnea,   Cardiovascular - normal exam    (+) hypertension well controlled, PVD, hyperlipidemia      Neuro/Psych  GI/Hepatic/Renal/Endo    (+) obesity,  GERD,     Musculoskeletal     Abdominal    Substance History      OB/GYN          Other                                        Anesthesia Plan    ASA 3     MAC     intravenous induction   Anesthetic plan and risks discussed with patient.

## 2017-12-12 NOTE — H&P
Cc: screening colon    Sachin Almanzar is a 67 y.o. male  who presents for colonoscopy.  No problems currently.  Occasional constipation.    He denies any change in bowel function, melena, hematochezia or unexplained weight loss.      Past Medical History:   Diagnosis Date   • Basal cell carcinoma of skin 7/10/2012    Overview:  NOSE   • Gastric ulcer    • Hypertension    • Kidney stones    • Malignant neoplasm of urinary bladder 7/10/2012    Overview:  3/2011, Dr. Muniz, TURBT, BCG and infusion of chemo   • Melanoma in situ of face 2016    Overview:  Removed per Dr. Steen (derm) on 2016   • Sleep apnea with use of continuous positive airway pressure (CPAP)    • SVT (supraventricular tachycardia)        Past Surgical History:   Procedure Laterality Date   • APPENDECTOMY     • BLADDER TUMOR EXCISION     • COSMETIC SURGERY      Nose - melanoma removal   • TONSILLECTOMY           Current Facility-Administered Medications:   •  lactated ringers infusion 1,000 mL, 1,000 mL, Intravenous, Continuous PRN, Domingo Cantrell MD, Last Rate: 25 mL/hr at 17 1212, 1,000 mL at 17 1212  •  sodium chloride 0.9 % flush 3 mL, 3 mL, Intravenous, PRN, Domingo Cantrell MD    Allergies   Allergen Reactions   • Ace Inhibitors    • Iodine Nausea And Vomiting   • Shellfish-Derived Products Hives     Throat swelling, itching       Family History   Problem Relation Age of Onset   • Heart attack Mother       age 55   • Cancer Father       at age 44   • Diabetes Maternal Grandmother    • Hypertension Paternal Uncle        Social History     Social History   • Marital status:      Spouse name: Julita   • Number of children: 1   • Years of education: N/A     Occupational History   • retired president of [employer]      Social History Main Topics   • Smoking status: Never Smoker   • Smokeless tobacco: Never Used   • Alcohol use Yes      Comment: 2-3 beers 5 days a week   • Drug use: No   • Sexual activity:  Defer     Other Topics Concern   • Not on file     Social History Narrative       ROS: 14 systems were reviewed and negative other than presenting complaints.          Vitals:    12/12/17 1158   BP: (!) 149/105   Pulse: 82   Resp: 16   Temp: 98.9 °F (37.2 °C)   SpO2: 99%       PHYSICAL EXAM:  - Constitutional: Well-developed well-nourished, no acute distress  - Eyes: Conjunctiva normal, sclera nonicteric  - ENMT: Hearing grossly normal, oral mucosa moist  - Respiratory: Clear to auscultation, normal inspiratory effort  - Cardiovascular: Regular rate, no peripheral edema, no jugular venous distention  - Gastrointestinal: Soft, nontender, no palpable mass, no hepatosplenomegaly, negative for hernia, bowel sounds normal  - Skin: Warm, dry  - Musculoskeletal: Symmetric strength, normal gait  - Psychiatric: Alert and oriented ×3, normal affect   - Rectal-small skin tag, no anal fissure, no hemorrhoids           Assessment/Plan   Encounter for screening colonoscopy.  Plan for colonoscopy.      Indications, risks and procedure were discussed with the patient including but not limited to bleeding, infection, possibility of perforation and possible polypectomy. All of the patient's questions were answered and they would like to proceed with the above recommendations.  High fiber diet and supplementation information was given to the patient and he should start this after his colonoscopy.  Importance of staying well hydrated was discussed.        Domingo Cantrell MD  General and Endoscopic Surgery  [facility], [facility]    [address], [address]  [place], [place], [address]  P: [phone]  F: [phone]

## 2017-12-13 LAB
LAB AP CASE REPORT: NORMAL
Lab: NORMAL
PATH REPORT.FINAL DX SPEC: NORMAL
PATH REPORT.GROSS SPEC: NORMAL

## 2017-12-15 ENCOUNTER — TELEPHONE (OUTPATIENT)
Dept: SURGERY | Facility: CLINIC | Age: 67
End: 2017-12-15

## 2017-12-15 NOTE — TELEPHONE ENCOUNTER
Patient called for pathology results from Colonoscopy done by Dr. Cantrell.  Informed patient that per pathology report polyp was tubular adenoma and patient will need to have repeat Colonoscopy in 5 years.  Recall put in computer, health maintenance tab updated.  Patient will call back w/ any additional questions, patient verbalized understanding.

## 2018-01-30 RX ORDER — VALSARTAN 40 MG/1
TABLET ORAL
Qty: 90 TABLET | Refills: 3 | Status: SHIPPED | OUTPATIENT
Start: 2018-01-30 | End: 2018-07-22 | Stop reason: HOSPADM

## 2018-04-16 RX ORDER — ATORVASTATIN CALCIUM 20 MG/1
TABLET, FILM COATED ORAL
Qty: 90 TABLET | Refills: 3 | Status: SHIPPED | OUTPATIENT
Start: 2018-04-16 | End: 2019-04-14 | Stop reason: SDUPTHER

## 2018-04-23 DIAGNOSIS — E78.2 MIXED HYPERLIPIDEMIA: ICD-10-CM

## 2018-04-23 DIAGNOSIS — I10 ESSENTIAL HYPERTENSION: ICD-10-CM

## 2018-04-23 DIAGNOSIS — R73.9 ELEVATED SERUM GLUCOSE: ICD-10-CM

## 2018-04-23 DIAGNOSIS — Z12.5 SCREENING FOR PROSTATE CANCER: ICD-10-CM

## 2018-04-23 DIAGNOSIS — Z00.00 HEALTH CARE MAINTENANCE: Primary | ICD-10-CM

## 2018-04-25 LAB
ALBUMIN SERPL-MCNC: 4 G/DL (ref 3.5–5.2)
ALBUMIN/GLOB SERPL: 1.5 G/DL
ALP SERPL-CCNC: 52 U/L (ref 39–117)
ALT SERPL-CCNC: 32 U/L (ref 1–41)
APPEARANCE UR: CLEAR
AST SERPL-CCNC: 29 U/L (ref 1–40)
BACTERIA #/AREA URNS HPF: ABNORMAL /HPF
BASOPHILS # BLD AUTO: 0.04 10*3/MM3 (ref 0–0.2)
BASOPHILS NFR BLD AUTO: 0.7 % (ref 0–1.5)
BILIRUB SERPL-MCNC: 0.9 MG/DL (ref 0.1–1.2)
BILIRUB UR QL STRIP: NEGATIVE
BUN SERPL-MCNC: 25 MG/DL (ref 8–23)
BUN/CREAT SERPL: 16.8 (ref 7–25)
CALCIUM SERPL-MCNC: 10.3 MG/DL (ref 8.6–10.5)
CHLORIDE SERPL-SCNC: 102 MMOL/L (ref 98–107)
CHOLEST SERPL-MCNC: 116 MG/DL (ref 0–200)
CO2 SERPL-SCNC: 26.1 MMOL/L (ref 22–29)
COLOR UR: YELLOW
CREAT SERPL-MCNC: 1.49 MG/DL (ref 0.76–1.27)
CRYSTALS URNS MICRO: ABNORMAL
EOSINOPHIL # BLD AUTO: 0.17 10*3/MM3 (ref 0–0.7)
EOSINOPHIL NFR BLD AUTO: 3 % (ref 0.3–6.2)
EPI CELLS #/AREA URNS HPF: ABNORMAL /HPF
ERYTHROCYTE [DISTWIDTH] IN BLOOD BY AUTOMATED COUNT: 13.7 % (ref 11.5–14.5)
GFR SERPLBLD CREATININE-BSD FMLA CKD-EPI: 47 ML/MIN/1.73
GFR SERPLBLD CREATININE-BSD FMLA CKD-EPI: 57 ML/MIN/1.73
GLOBULIN SER CALC-MCNC: 2.7 GM/DL
GLUCOSE SERPL-MCNC: 112 MG/DL (ref 65–99)
GLUCOSE UR QL: NEGATIVE
HBA1C MFR BLD: 5.3 % (ref 4.8–5.6)
HCT VFR BLD AUTO: 37.7 % (ref 40.4–52.2)
HDLC SERPL-MCNC: 29 MG/DL (ref 40–60)
HGB BLD-MCNC: 13.3 G/DL (ref 13.7–17.6)
HGB UR QL STRIP: NEGATIVE
IMM GRANULOCYTES # BLD: 0.02 10*3/MM3 (ref 0–0.03)
IMM GRANULOCYTES NFR BLD: 0.4 % (ref 0–0.5)
KETONES UR QL STRIP: NEGATIVE
LDLC SERPL CALC-MCNC: 51 MG/DL (ref 0–100)
LEUKOCYTE ESTERASE UR QL STRIP: NEGATIVE
LYMPHOCYTES # BLD AUTO: 0.67 10*3/MM3 (ref 0.9–4.8)
LYMPHOCYTES NFR BLD AUTO: 11.8 % (ref 19.6–45.3)
Lab: NORMAL
Lab: NORMAL
MCH RBC QN AUTO: 33.3 PG (ref 27–32.7)
MCHC RBC AUTO-ENTMCNC: 35.3 G/DL (ref 32.6–36.4)
MCV RBC AUTO: 94.5 FL (ref 79.8–96.2)
MICRO URNS: NORMAL
MICRO URNS: NORMAL
MONOCYTES # BLD AUTO: 0.73 10*3/MM3 (ref 0.2–1.2)
MONOCYTES NFR BLD AUTO: 12.8 % (ref 5–12)
MUCOUS THREADS URNS QL MICRO: PRESENT /HPF
NEUTROPHILS # BLD AUTO: 4.08 10*3/MM3 (ref 1.9–8.1)
NEUTROPHILS NFR BLD AUTO: 71.7 % (ref 42.7–76)
NITRITE UR QL STRIP: NEGATIVE
PH UR STRIP: 6.5 [PH] (ref 5–7.5)
PLATELET # BLD AUTO: 194 10*3/MM3 (ref 140–500)
POTASSIUM SERPL-SCNC: 4.2 MMOL/L (ref 3.5–5.2)
PROT SERPL-MCNC: 6.7 G/DL (ref 6–8.5)
PROT UR QL STRIP: NEGATIVE
PSA SERPL-MCNC: 0.61 NG/ML (ref 0–4)
RBC # BLD AUTO: 3.99 10*6/MM3 (ref 4.6–6)
RBC #/AREA URNS HPF: ABNORMAL /HPF
SODIUM SERPL-SCNC: 142 MMOL/L (ref 136–145)
SP GR UR: 1.02 (ref 1–1.03)
TRIGL SERPL-MCNC: 178 MG/DL (ref 0–150)
TSH SERPL DL<=0.005 MIU/L-ACNC: 1.6 MIU/ML (ref 0.27–4.2)
UNIDENT CRYS URNS QL MICRO: PRESENT /LPF
URINALYSIS REFLEX: NORMAL
UROBILINOGEN UR STRIP-MCNC: 0.2 MG/DL (ref 0.2–1)
VLDLC SERPL CALC-MCNC: 35.6 MG/DL (ref 5–40)
WBC # BLD AUTO: 5.69 10*3/MM3 (ref 4.5–10.7)
WBC #/AREA URNS HPF: ABNORMAL /HPF

## 2018-04-26 LAB
FERRITIN SERPL-MCNC: 401.9 NG/ML (ref 30–400)
IRON SATN MFR SERPL: 39 % (ref 20–50)
IRON SERPL-MCNC: 127 MCG/DL (ref 59–158)
TIBC SERPL-MCNC: 323 MCG/DL
UIBC SERPL-MCNC: 196 MCG/DL
WRITTEN AUTHORIZATION: NORMAL

## 2018-04-27 ENCOUNTER — TELEPHONE (OUTPATIENT)
Dept: INTERNAL MEDICINE | Facility: CLINIC | Age: 68
End: 2018-04-27

## 2018-04-27 DIAGNOSIS — R79.89 ELEVATED SERUM CREATININE: Primary | ICD-10-CM

## 2018-04-27 NOTE — TELEPHONE ENCOUNTER
----- Message from Hali Potts MD sent at 4/25/2018 10:59 AM EDT -----  Please call - his kidney function has changed quite a bit.  Is he taking a lot of ibuprofen or aleve?  If so, advise to stop all nsaids.  If he's not taking any, then will order a kidney u/s to be done before his appt with me.  His hgb is also low.  Has he noticed any change in bms?  Please add iron, tibc, and ferritin to his labs.

## 2018-04-30 NOTE — TELEPHONE ENCOUNTER
[phone]  Wife called to confirm they received the voicemail.     Pt stated that he has taken two tabs of ibuprofen daily.   Pt stated that he is experiencing constipation.     Has an apt scheduled for Monday, do you want the patient to do anything differently until his apt? Out of town with work right now.

## 2018-05-03 ENCOUNTER — HOSPITAL ENCOUNTER (OUTPATIENT)
Dept: ULTRASOUND IMAGING | Facility: HOSPITAL | Age: 68
Discharge: HOME | End: 2018-05-03
Admitting: INTERNAL MEDICINE

## 2018-05-03 DIAGNOSIS — R79.89 ELEVATED SERUM CREATININE: ICD-10-CM

## 2018-05-03 PROCEDURE — 76775 US EXAM ABDO BACK WALL LIM: CPT

## 2018-05-07 ENCOUNTER — OFFICE VISIT (OUTPATIENT)
Dept: INTERNAL MEDICINE | Facility: CLINIC | Age: 68
End: 2018-05-07

## 2018-05-07 VITALS
RESPIRATION RATE: 18 BRPM | WEIGHT: 239 LBS | DIASTOLIC BLOOD PRESSURE: 72 MMHG | HEART RATE: 92 BPM | BODY MASS INDEX: 34.22 KG/M2 | SYSTOLIC BLOOD PRESSURE: 130 MMHG | HEIGHT: 70 IN | OXYGEN SATURATION: 99 %

## 2018-05-07 DIAGNOSIS — Q61.02 MULTIPLE RENAL CYSTS: ICD-10-CM

## 2018-05-07 DIAGNOSIS — D64.9 NORMOCYTIC ANEMIA: ICD-10-CM

## 2018-05-07 DIAGNOSIS — Z00.00 MEDICARE ANNUAL WELLNESS VISIT, SUBSEQUENT: Primary | ICD-10-CM

## 2018-05-07 DIAGNOSIS — R79.89 ABNORMAL SERUM CREATININE LEVEL: ICD-10-CM

## 2018-05-07 DIAGNOSIS — E78.2 MIXED HYPERLIPIDEMIA: ICD-10-CM

## 2018-05-07 DIAGNOSIS — I10 ESSENTIAL HYPERTENSION: ICD-10-CM

## 2018-05-07 PROCEDURE — 99214 OFFICE O/P EST MOD 30 MIN: CPT | Performed by: INTERNAL MEDICINE

## 2018-05-07 PROCEDURE — G0439 PPPS, SUBSEQ VISIT: HCPCS | Performed by: INTERNAL MEDICINE

## 2018-05-07 NOTE — PATIENT INSTRUCTIONS
Medicare Wellness  Personal Prevention Plan of Service     Date of Office Visit:  2018  Encounter Provider:  Hali Potts MD  Place of Service:  [facility] INTERNAL MEDICINE  Patient Name: Sachin Almanzar  :  1950    As part of the Medicare Wellness portion of your visit today, we are providing you with this personalized preventive plan of services (PPPS). This plan is based upon recommendations of the [place] Preventive Services Task Force (USPSTF) and the Advisory Committee on Immunization Practices (ACIP).    This lists the preventive care services that should be considered, and provides dates of when you are due. Items listed as completed are up-to-date and do not require any further intervention.    Health Maintenance   Topic Date Due   • MEDICARE ANNUAL WELLNESS  2018   • INFLUENZA VACCINE  2018   • LIPID PANEL  2019   • COLONOSCOPY  2022   • TDAP/TD VACCINES (2 - Td) 2023   • HEPATITIS C SCREENING  Completed   • PNEUMOCOCCAL VACCINES (65+ LOW/MEDIUM RISK)  Completed   • ZOSTER VACCINE  Completed       No orders of the defined types were placed in this encounter.      No Follow-up on file.

## 2018-05-07 NOTE — PROGRESS NOTES
Medicare Annual Wellness Visit    Chief Complaint:  Annual Exam (SUB AWV); Hypertension; and Hyperlipidemia      History of Present Illness    Sachin Almanzar is a 67 y.o. male who presents for an Annual Wellness Visit. In addition, we addressed the following health issues:    C-scope: 12/12/17  Dental Exam: about 3 mo ago.  He goes 3-4 times a year  Eye Exam: one month ago  Exercise: not very much  Advanced Care Planning:  has an advance directive - a copy HAS NOT been provided   Immunization History   Administered Date(s) Administered   • Flu Vaccine High Dose PF 65YR+ 10/02/2015, 09/23/2016, 10/12/2017   • Pneumococcal Conjugate 13-Valent (PCV13) 07/06/2016   • Pneumococcal Polysaccharide (PPSV23) 08/28/2013   • Td, Not Adsorbed 01/01/2002   • Tdap 02/25/2013   • Zoster 12/18/2009     Sachin is here for follow up on his HLD and HTN.   He has a new change in kidney function.  He had been taking ibuprofen almost daily for several months.  He has stopped this.  We ordered a renal u/s which showed cysts.  He has had kidney stones and bladder cancer and is followed by Dr René Muniz whom he has a follow up with tomorrow.  His only other issue is constipation for several months.  He just had a c-scope in 12/2017.  His hgb is down a bit but his iron levels are normal.  He has not noticed any blood in his urine or his stools.      Review of Systems   Constitution: Positive for malaise/fatigue (more in the afternoons than he used to). Negative for chills and fever.   HENT: Positive for hearing loss (acc to his wife). Negative for hoarse voice and sore throat.    Eyes: Negative for blurred vision, double vision and visual disturbance.   Cardiovascular: Negative for chest pain, leg swelling and palpitations.   Respiratory: Negative for cough and shortness of breath.    Endocrine: Negative for polydipsia and polyuria.   Hematologic/Lymphatic: Does not bruise/bleed easily.   Skin: Negative for rash and suspicious lesions.    Musculoskeletal: Negative for arthritis and myalgias.   Gastrointestinal: Positive for change in bowel habit and constipation (since the fall). Negative for bloating, abdominal pain, diarrhea, dysphagia, hematochezia, melena, nausea and vomiting.   Genitourinary: Negative for dysuria and hematuria.   Neurological: Negative for dizziness, headaches and light-headedness.   Psychiatric/Behavioral: Negative for depression. The patient is not nervous/anxious.        Past Medical History:   Diagnosis Date   • Basal cell carcinoma of skin 7/10/2012    Overview:  NOSE   • Gastric ulcer    • Hypertension    • Kidney stones    • Malignant neoplasm of urinary bladder 7/10/2012    Overview:  3/2011, Dr. Muniz, TURBT, BCG and infusion of chemo   • Melanoma in situ of face 2016    Overview:  Removed per Dr. Steen (derm) on 2016   • Sleep apnea with use of continuous positive airway pressure (CPAP)    • SVT (supraventricular tachycardia)        Past Surgical History:   Procedure Laterality Date   • APPENDECTOMY     • BLADDER TUMOR EXCISION     • COLONOSCOPY N/A 2017    Procedure: COLONOSCOPY INTO CECUM WITH COLD BX POLYPECTOMY ;  Surgeon: Domingo Cantrell MD;  Location: [facility] ENDOSCOPY;  Service:    • COSMETIC SURGERY      Nose - melanoma removal   • MOHS SURGERY      2 bcc   • TONSILLECTOMY         Social History     Social History   • Marital status:      Spouse name: Julita   • Number of children: 1   • Years of education: N/A     Occupational History   • retired president of [employer]      Social History Main Topics   • Smoking status: Never Smoker   • Smokeless tobacco: Never Used   • Alcohol use Yes      Comment: 2-3 beers 5 days a week   • Drug use: No   • Sexual activity: Defer     Other Topics Concern   • Not on file     Social History Narrative   • No narrative on file       Family History   Problem Relation Age of Onset   • Heart attack Mother       age 55   • Cancer Father       at  age 44   • Diabetes Maternal Grandmother    • Hypertension Paternal Uncle        Allergies   Allergen Reactions   • Ace Inhibitors    • Iodine Nausea And Vomiting   • Shellfish-Derived Products Hives     Throat swelling, itching       Current Outpatient Prescriptions on File Prior to Visit   Medication Sig Dispense Refill   • aspirin 81 MG EC tablet Take 81 mg by mouth Daily.     • atorvastatin (LIPITOR) 20 MG tablet TAKE 1 TABLET EVERY NIGHT 90 tablet 3   • fexofenadine (ALLEGRA) 180 MG tablet Take 180 mg by mouth.     • Multiple Vitamin (MULTIVITAMIN) capsule Take 1 capsule by mouth.     • raNITIdine (ZANTAC) 300 MG tablet      • sildenafil (VIAGRA) 100 MG tablet      • valsartan (DIOVAN) 40 MG tablet TAKE 1 TABLET BY MOUTH DAILY 90 tablet 3   • verapamil (VERELAN) 100 MG 24 hr capsule Take 2 capsules by mouth 2 (Two) Times a Day. 56 capsule 0   • [DISCONTINUED] ibuprofen (ADVIL,MOTRIN) 400 MG tablet Take 400 mg by mouth Every 6 (Six) Hours.       No current facility-administered medications on file prior to visit.        Patient Active Problem List   Diagnosis   • Atopic rhinitis   • Impotence of organic origin   • Gastroesophageal reflux disease   • Transient acantholytic dermatosis   • History of colonic polyps   • Hyperlipidemia   • Hypertension   • Calculus of kidney   • Adult body mass index greater than 30   • Obstructive sleep apnea syndrome   • Supraventricular tachycardia   • Venous insufficiency of lower extremity   • Allergy to shellfish   • History of colon polyps   • Elevated serum glucose   • Multiple renal cysts       Health Risk Assessment/Depression Screen/Functional and Cognitive Screening:   The patient has completed a Health Risk Assessment & Depression screen. These have been reviewed with them and have been scanned as a separate documents.    Age-appropriate Screening Schedule:  Refer to the list below for future screening recommendations based on patient's age. Orders for these recommended  "tests are listed in the plan section. The patient has been provided with a written plan.     I have reviewed their problem list, past medical history, family history, social history, and surgical history.     Vitals:    05/07/18 1053   BP: 130/72   Pulse: 92   Resp: 18   SpO2: 99%   Weight: 108 kg (239 lb)   Height: 177.8 cm (70\")   PainSc: 0-No pain       Body mass index is 34.29 kg/m².    Physical Exam   Constitutional: He is oriented to person, place, and time. He appears well-developed and well-nourished. No distress.   HENT:   Head: Normocephalic and atraumatic.   Right Ear: Hearing and external ear normal.   Left Ear: Hearing and external ear normal.   Nose: Nose normal.   Mouth/Throat: Oropharynx is clear and moist and mucous membranes are normal.   Eyes: Conjunctivae, EOM and lids are normal. Pupils are equal, round, and reactive to light. No scleral icterus.   Neck: Trachea normal and normal range of motion. Neck supple.   Cardiovascular: Normal rate, regular rhythm and normal heart sounds.  Exam reveals no gallop and no friction rub.    No murmur heard.  Pulses:       Carotid pulses are 2+ on the right side, and 2+ on the left side.       Femoral pulses are 2+ on the right side, and 2+ on the left side.       Dorsalis pedis pulses are 2+ on the right side, and 2+ on the left side.        Posterior tibial pulses are 2+ on the right side, and 2+ on the left side.   Pulmonary/Chest: Effort normal and breath sounds normal. No respiratory distress. He has no wheezes. He has no rales.   Abdominal: Soft. Normal appearance and bowel sounds are normal. He exhibits no distension and no mass. There is no tenderness.   Musculoskeletal: Normal range of motion. He exhibits no edema.     Vascular Status -  His right foot exhibits no edema. His left foot exhibits no edema.  Skin Integrity  -  His right foot skin is intact.His left foot skin is intact..  Lymphadenopathy:     He has no cervical adenopathy.        Right: No " inguinal and no supraclavicular adenopathy present.        Left: No inguinal and no supraclavicular adenopathy present.   Neurological: He is alert and oriented to person, place, and time. He has normal strength. No cranial nerve deficit. He exhibits normal muscle tone. Coordination and gait normal.   Skin: Skin is warm and dry. No rash noted.   Psychiatric: He has a normal mood and affect. His speech is normal and behavior is normal. Judgment and thought content normal. Cognition and memory are normal.   Vitals reviewed.      Results for orders placed or performed in visit on 04/23/18   Comprehensive Metabolic Panel   Result Value Ref Range    Glucose 112 (H) 65 - 99 mg/dL    BUN 25 (H) 8 - 23 mg/dL    Creatinine 1.49 (H) 0.76 - 1.27 mg/dL    eGFR Non African Am 47 (L) >60 mL/min/1.73    eGFR African Am 57 (L) >60 mL/min/1.73    BUN/Creatinine Ratio 16.8 7.0 - 25.0    Sodium 142 136 - 145 mmol/L    Potassium 4.2 3.5 - 5.2 mmol/L    Chloride 102 98 - 107 mmol/L    Total CO2 26.1 22.0 - 29.0 mmol/L    Calcium 10.3 8.6 - 10.5 mg/dL    Total Protein 6.7 6.0 - 8.5 g/dL    Albumin 4.00 3.50 - 5.20 g/dL    Globulin 2.7 gm/dL    A/G Ratio 1.5 g/dL    Total Bilirubin 0.9 0.1 - 1.2 mg/dL    Alkaline Phosphatase 52 39 - 117 U/L    AST (SGOT) 29 1 - 40 U/L    ALT (SGPT) 32 1 - 41 U/L   Lipid Panel   Result Value Ref Range    Total Cholesterol 116 0 - 200 mg/dL    Triglycerides 178 (H) 0 - 150 mg/dL    HDL Cholesterol 29 (L) 40 - 60 mg/dL    VLDL Cholesterol 35.6 5 - 40 mg/dL    LDL Cholesterol  51 0 - 100 mg/dL   TSH Rfx On Abnormal To Free T4   Result Value Ref Range    TSH 1.60 0.27 - 4.2 mIU/mL   Urinalysis With / Culture If Indicated - Urine, Clean Catch   Result Value Ref Range    Specific Gravity, UA 1.022 1.005 - 1.030    pH, UA 6.5 5.0 - 7.5    Color, UA Yellow Yellow    Appearance, UA Clear Clear    Leukocytes, UA Negative Negative    Protein Negative Negative/Trace    Glucose, UA Negative Negative    Ketones Negative  Negative    Blood, UA Negative Negative    Bilirubin, UA Negative Negative    Urobilinogen, UA 0.2 0.2 - 1.0 mg/dL    Nitrite, UA Negative Negative    Microscopic Examination Comment     MICROSCOPIC EXAMINATION See below:     Urinalysis Reflex Comment    Hemoglobin A1c   Result Value Ref Range    Hemoglobin A1C 5.30 4.80 - 5.60 %   PSA DIAGNOSTIC   Result Value Ref Range    PSA 0.610 0.000 - 4.000 ng/mL   Microscopic Examination   Result Value Ref Range    WBC, UA 0-5 0 - 5 /hpf    RBC, UA 0-2 0 - 2 /hpf    Epithelial Cells (non renal) None seen 0 - 10 /hpf    Crystals, UA Present (A) N/A /lpf    Crystal Type Calcium Oxalate N/A    Mucus, UA Present Not Estab. /hpf    Bacteria, UA Few None seen/Few /hpf   Verbal Order   Result Value Ref Range    See below: Comment:     Additional Test(s) Requested Comment:    Iron Profile   Result Value Ref Range    TIBC 323 mcg/dL    UIBC 196 mcg/dL    Iron 127 59 - 158 mcg/dL    Iron Saturation 39 20 - 50 %   Ferritin   Result Value Ref Range    Ferritin 401.90 (H) 30.00 - 400.00 ng/mL   Written Authorization   Result Value Ref Range    Written Authorization Comment    CBC & Differential   Result Value Ref Range    WBC 5.69 4.50 - 10.70 10*3/mm3    RBC 3.99 (L) 4.60 - 6.00 10*6/mm3    Hemoglobin 13.3 (L) 13.7 - 17.6 g/dL    Hematocrit 37.7 (L) 40.4 - 52.2 %    MCV 94.5 79.8 - 96.2 fL    MCH 33.3 (H) 27.0 - 32.7 pg    MCHC 35.3 32.6 - 36.4 g/dL    RDW 13.7 11.5 - 14.5 %    Platelets 194 140 - 500 10*3/mm3    Neutrophil Rel % 71.7 42.7 - 76.0 %    Lymphocyte Rel % 11.8 (L) 19.6 - 45.3 %    Monocyte Rel % 12.8 (H) 5.0 - 12.0 %    Eosinophil Rel % 3.0 0.3 - 6.2 %    Basophil Rel % 0.7 0.0 - 1.5 %    Neutrophils Absolute 4.08 1.90 - 8.10 10*3/mm3    Lymphocytes Absolute 0.67 (L) 0.90 - 4.80 10*3/mm3    Monocytes Absolute 0.73 0.20 - 1.20 10*3/mm3    Eosinophils Absolute 0.17 0.00 - 0.70 10*3/mm3    Basophils Absolute 0.04 0.00 - 0.20 10*3/mm3    Immature Granulocyte Rel % 0.4 0.0 - 0.5  %    Immature Grans Absolute 0.02 0.00 - 0.03 10*3/mm3       Assessment & Plan:    Diagnoses and all orders for this visit:    Medicare annual wellness visit, subsequent    Essential hypertension    Mixed hyperlipidemia    Abnormal serum creatinine level  -     Basic Metabolic Panel    Normocytic anemia  -     CBC & Differential  -     Vitamin B12    Multiple renal cysts        Discussion/Summary  Sachin is here for AWV and follow up.  He is up to date on all health maintenance.  His bp is controlled.  His LDL is controlled.  His creatinine has increased on his most recent labs.  He has stopped the daily nsaids he had been using otc.  We are going to recheck his bmp today along with his CBC and B12.  He will discuss his renal u/s with Dr. Muniz tomorrow.  He most likely has comparison images.        Follow Up:  Return in about 6 months (around 11/7/2018) for Next scheduled follow up, with nonfasting labs prior.     An After Visit Summary and PPPS with all of these plans were given to the patient.

## 2018-05-08 LAB
BASOPHILS # BLD AUTO: 0.03 10*3/MM3 (ref 0–0.2)
BASOPHILS NFR BLD AUTO: 0.5 % (ref 0–1.5)
BUN SERPL-MCNC: 20 MG/DL (ref 8–23)
BUN/CREAT SERPL: 14.6 (ref 7–25)
CALCIUM SERPL-MCNC: 9.8 MG/DL (ref 8.6–10.5)
CHLORIDE SERPL-SCNC: 98 MMOL/L (ref 98–107)
CO2 SERPL-SCNC: 24.3 MMOL/L (ref 22–29)
CREAT SERPL-MCNC: 1.37 MG/DL (ref 0.76–1.27)
EOSINOPHIL # BLD AUTO: 0.2 10*3/MM3 (ref 0–0.7)
EOSINOPHIL NFR BLD AUTO: 3.4 % (ref 0.3–6.2)
ERYTHROCYTE [DISTWIDTH] IN BLOOD BY AUTOMATED COUNT: 13.5 % (ref 11.5–14.5)
GFR SERPLBLD CREATININE-BSD FMLA CKD-EPI: 52 ML/MIN/1.73
GFR SERPLBLD CREATININE-BSD FMLA CKD-EPI: 62 ML/MIN/1.73
GLUCOSE SERPL-MCNC: 97 MG/DL (ref 65–99)
HCT VFR BLD AUTO: 41.5 % (ref 40.4–52.2)
HGB BLD-MCNC: 14.1 G/DL (ref 13.7–17.6)
IMM GRANULOCYTES # BLD: 0.02 10*3/MM3 (ref 0–0.03)
IMM GRANULOCYTES NFR BLD: 0.3 % (ref 0–0.5)
LYMPHOCYTES # BLD AUTO: 0.73 10*3/MM3 (ref 0.9–4.8)
LYMPHOCYTES NFR BLD AUTO: 12.3 % (ref 19.6–45.3)
MCH RBC QN AUTO: 32.8 PG (ref 27–32.7)
MCHC RBC AUTO-ENTMCNC: 34 G/DL (ref 32.6–36.4)
MCV RBC AUTO: 96.5 FL (ref 79.8–96.2)
MONOCYTES # BLD AUTO: 0.61 10*3/MM3 (ref 0.2–1.2)
MONOCYTES NFR BLD AUTO: 10.3 % (ref 5–12)
NEUTROPHILS # BLD AUTO: 4.35 10*3/MM3 (ref 1.9–8.1)
NEUTROPHILS NFR BLD AUTO: 73.5 % (ref 42.7–76)
PLATELET # BLD AUTO: 233 10*3/MM3 (ref 140–500)
POTASSIUM SERPL-SCNC: 4 MMOL/L (ref 3.5–5.2)
RBC # BLD AUTO: 4.3 10*6/MM3 (ref 4.6–6)
SODIUM SERPL-SCNC: 138 MMOL/L (ref 136–145)
VIT B12 SERPL-MCNC: 949 PG/ML (ref 211–946)
WBC # BLD AUTO: 5.92 10*3/MM3 (ref 4.5–10.7)

## 2018-05-16 ENCOUNTER — TELEPHONE (OUTPATIENT)
Dept: INTERNAL MEDICINE | Facility: CLINIC | Age: 68
End: 2018-05-16

## 2018-05-16 ENCOUNTER — OFFICE VISIT (OUTPATIENT)
Dept: INTERNAL MEDICINE | Facility: CLINIC | Age: 68
End: 2018-05-16

## 2018-05-16 VITALS
HEART RATE: 92 BPM | SYSTOLIC BLOOD PRESSURE: 144 MMHG | DIASTOLIC BLOOD PRESSURE: 82 MMHG | WEIGHT: 239 LBS | RESPIRATION RATE: 18 BRPM | BODY MASS INDEX: 34.22 KG/M2 | OXYGEN SATURATION: 98 % | HEIGHT: 70 IN

## 2018-05-16 DIAGNOSIS — M62.830 MUSCLE SPASM OF BACK: ICD-10-CM

## 2018-05-16 DIAGNOSIS — R79.89 ABNORMAL SERUM CREATININE LEVEL: ICD-10-CM

## 2018-05-16 DIAGNOSIS — M54.50 ACUTE MIDLINE LOW BACK PAIN WITHOUT SCIATICA: Primary | ICD-10-CM

## 2018-05-16 PROCEDURE — 72110 X-RAY EXAM L-2 SPINE 4/>VWS: CPT | Performed by: INTERNAL MEDICINE

## 2018-05-16 PROCEDURE — 99214 OFFICE O/P EST MOD 30 MIN: CPT | Performed by: INTERNAL MEDICINE

## 2018-05-16 RX ORDER — METHYLPREDNISOLONE 4 MG/1
TABLET ORAL
Qty: 21 TABLET | Refills: 0 | Status: SHIPPED | OUTPATIENT
Start: 2018-05-16 | End: 2018-07-17

## 2018-05-16 RX ORDER — CYCLOBENZAPRINE HCL 10 MG
10 TABLET ORAL 3 TIMES DAILY PRN
Qty: 20 TABLET | Refills: 0 | Status: SHIPPED | OUTPATIENT
Start: 2018-05-16 | End: 2018-07-17

## 2018-05-16 NOTE — TELEPHONE ENCOUNTER
Called patient and patient is scheduled today at 3:30 pm with Dr. Potts Patient is aware of appointment day and time

## 2018-05-16 NOTE — TELEPHONE ENCOUNTER
Patient wife called and stated that Mr. Almanzar is having serve back pain. She states that his pain started Monday and it has progressed. He is in so much pain that he can barley get out of his recliner. You had an available appointment tomorrow at 10:45 pm but patient wife stated that he is in such miserable pain that he would like to be seen today if possible. Would Dr. Potts want to work him in today?  Best call back number [phone]

## 2018-05-16 NOTE — PROGRESS NOTES
"Chief Complaint  Sachin Almanzar is a 67 y.o. male who presents for Back Pain (Stabbing pain in his lower back. Switching positions from sitting to standing, walking isn't too painful once he gets moving. )  .    History of Present Illness   Sachin is here for acute care for a new issue.  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  He felt a pull in his back in the middle of his lower back.  His back muscles feel weak.  But his legs don't feel weak.  No numbness or paresthesias in his legs.  He took some tylenol.  He hasn't taken any nsaids due to his kidney function.  He can get comfortable lying flat or sitting or even standing.  The difficult thing is when he goes from sitting to standing.  That's where the majority of the pain and \"weakness\" occurs     Review of Systems   Constitution: Negative for weakness and malaise/fatigue.   Musculoskeletal: Positive for back pain and myalgias (back muscles). Negative for muscle cramps.   Neurological: Negative for disturbances in coordination, loss of balance, numbness, paresthesias and sensory change.       Patient Active Problem List   Diagnosis   • Atopic rhinitis   • Impotence of organic origin   • Gastroesophageal reflux disease   • Transient acantholytic dermatosis   • History of colonic polyps   • Hyperlipidemia   • Hypertension   • Calculus of kidney   • Adult body mass index greater than 30   • Obstructive sleep apnea syndrome   • Supraventricular tachycardia   • Venous insufficiency of lower extremity   • Allergy to shellfish   • History of colon polyps   • Elevated serum glucose   • Multiple renal cysts       Past Medical History:   Diagnosis Date   • Basal cell carcinoma of skin 7/10/2012    Overview:  NOSE   • Gastric ulcer    • Hypertension    • Kidney stones    • Malignant neoplasm of urinary bladder 7/10/2012    Overview:  3/2011, Dr. Muniz, TURBT, BCG and infusion of chemo   • Melanoma in situ of face 6/14/2016    Overview:  " Removed per Dr. Steen (derm) on 2016   • Sleep apnea with use of continuous positive airway pressure (CPAP)    • SVT (supraventricular tachycardia)        Past Surgical History:   Procedure Laterality Date   • APPENDECTOMY     • BLADDER TUMOR EXCISION     • COLONOSCOPY N/A 2017    Procedure: COLONOSCOPY INTO CECUM WITH COLD BX POLYPECTOMY ;  Surgeon: Domingo Cantrell MD;  Location: [facility] ENDOSCOPY;  Service:    • COSMETIC SURGERY      Nose - melanoma removal   • MOHS SURGERY      2 bcc   • TONSILLECTOMY         Family History   Problem Relation Age of Onset   • Heart attack Mother          age 55   • Cancer Father          at age 44   • Diabetes Maternal Grandmother    • Hypertension Paternal Uncle        Social History     Social History   • Marital status:      Spouse name: Julita   • Number of children: 1   • Years of education: N/A     Occupational History   • retired president of [employer]      Social History Main Topics   • Smoking status: Never Smoker   • Smokeless tobacco: Never Used   • Alcohol use Yes      Comment: 2-3 beers 5 days a week   • Drug use: No   • Sexual activity: Defer     Other Topics Concern   • Not on file     Social History Narrative   • No narrative on file       Current Outpatient Prescriptions on File Prior to Visit   Medication Sig Dispense Refill   • aspirin 81 MG EC tablet Take 81 mg by mouth Daily.     • atorvastatin (LIPITOR) 20 MG tablet TAKE 1 TABLET EVERY NIGHT 90 tablet 3   • fexofenadine (ALLEGRA) 180 MG tablet Take 180 mg by mouth.     • Multiple Vitamin (MULTIVITAMIN) capsule Take 1 capsule by mouth.     • raNITIdine (ZANTAC) 300 MG tablet      • sildenafil (VIAGRA) 100 MG tablet      • valsartan (DIOVAN) 40 MG tablet TAKE 1 TABLET BY MOUTH DAILY 90 tablet 3   • verapamil (VERELAN) 100 MG 24 hr capsule Take 2 capsules by mouth 2 (Two) Times a Day. 56 capsule 0     No current facility-administered medications on file prior to visit.        Allergies  "  Allergen Reactions   • Ace Inhibitors    • Iodine Nausea And Vomiting   • Shellfish-Derived Products Hives     Throat swelling, itching       Vitals:    05/16/18 1534   BP: 144/82   Pulse: 92   Resp: 18   SpO2: 98%   Weight: 108 kg (239 lb)   Height: 177.8 cm (70\")       Body mass index is 34.29 kg/m².    Objective   Physical Exam   Constitutional: He is oriented to person, place, and time. He appears well-developed and well-nourished. No distress.   HENT:   Head: Normocephalic and atraumatic.   Musculoskeletal:        Lumbar back: He exhibits decreased range of motion and spasm. He exhibits no tenderness and no bony tenderness.   No SI joint tenderness   Neurological: He is alert and oriented to person, place, and time. No cranial nerve deficit.       Results for orders placed or performed in visit on 05/07/18   Basic Metabolic Panel   Result Value Ref Range    Glucose 97 65 - 99 mg/dL    BUN 20 8 - 23 mg/dL    Creatinine 1.37 (H) 0.76 - 1.27 mg/dL    eGFR Non African Am 52 (L) >60 mL/min/1.73    eGFR African Am 62 >60 mL/min/1.73    BUN/Creatinine Ratio 14.6 7.0 - 25.0    Sodium 138 136 - 145 mmol/L    Potassium 4.0 3.5 - 5.2 mmol/L    Chloride 98 98 - 107 mmol/L    Total CO2 24.3 22.0 - 29.0 mmol/L    Calcium 9.8 8.6 - 10.5 mg/dL   Vitamin B12   Result Value Ref Range    Vitamin B-12 949 (H) 211 - 946 pg/mL   CBC & Differential   Result Value Ref Range    WBC 5.92 4.50 - 10.70 10*3/mm3    RBC 4.30 (L) 4.60 - 6.00 10*6/mm3    Hemoglobin 14.1 13.7 - 17.6 g/dL    Hematocrit 41.5 40.4 - 52.2 %    MCV 96.5 (H) 79.8 - 96.2 fL    MCH 32.8 (H) 27.0 - 32.7 pg    MCHC 34.0 32.6 - 36.4 g/dL    RDW 13.5 11.5 - 14.5 %    Platelets 233 140 - 500 10*3/mm3    Neutrophil Rel % 73.5 42.7 - 76.0 %    Lymphocyte Rel % 12.3 (L) 19.6 - 45.3 %    Monocyte Rel % 10.3 5.0 - 12.0 %    Eosinophil Rel % 3.4 0.3 - 6.2 %    Basophil Rel % 0.5 0.0 - 1.5 %    Neutrophils Absolute 4.35 1.90 - 8.10 10*3/mm3    Lymphocytes Absolute 0.73 (L) 0.90 " - 4.80 10*3/mm3    Monocytes Absolute 0.61 0.20 - 1.20 10*3/mm3    Eosinophils Absolute 0.20 0.00 - 0.70 10*3/mm3    Basophils Absolute 0.03 0.00 - 0.20 10*3/mm3    Immature Granulocyte Rel % 0.3 0.0 - 0.5 %    Immature Grans Absolute 0.02 0.00 - 0.03 10*3/mm3     L spine 4 view  Reason for exam: back pain  Comparison film: none  Impression: diffuse degenerative disc disease, anterior spurring, loss of disc height.  No fx.      Assessment/Plan   Diagnoses and all orders for this visit:    Acute midline low back pain without sciatica  -     XR Spine Lumbar 4+ View    Muscle spasm of back    Abnormal serum creatinine level    Other orders  -     MethylPREDNISolone (MEDROL) 4 MG tablet; follow package directions  -     cyclobenzaprine (FLEXERIL) 10 MG tablet; Take 1 tablet by mouth 3 (Three) Times a Day As Needed for Muscle Spasms.        Discussion/Summary  Sachin is here for acute care.  He ha pretty extensive DDD in his spine on imaging.  Will treat with medrol and flexeril for now.  If his symptoms do not improve in the next week, or if they worsen, will proceed to MRI.    No Follow-up on file.

## 2018-05-21 ENCOUNTER — TRANSCRIBE ORDERS (OUTPATIENT)
Dept: ADMINISTRATIVE | Facility: HOSPITAL | Age: 68
End: 2018-05-21

## 2018-05-21 DIAGNOSIS — Z13.6 ENCOUNTER FOR SCREENING FOR VASCULAR DISEASE: Primary | ICD-10-CM

## 2018-05-23 ENCOUNTER — HOSPITAL ENCOUNTER (OUTPATIENT)
Dept: CARDIOLOGY | Facility: HOSPITAL | Age: 68
Discharge: HOME | End: 2018-05-23
Admitting: INTERNAL MEDICINE

## 2018-05-23 VITALS
HEIGHT: 70 IN | SYSTOLIC BLOOD PRESSURE: 131 MMHG | HEART RATE: 92 BPM | DIASTOLIC BLOOD PRESSURE: 81 MMHG | BODY MASS INDEX: 33.79 KG/M2 | WEIGHT: 236 LBS

## 2018-05-23 DIAGNOSIS — Z13.6 ENCOUNTER FOR SCREENING FOR VASCULAR DISEASE: ICD-10-CM

## 2018-05-23 LAB
BH CV ECHO MEAS - DIST AO DIAM: 1.62 CM
BH CV VAS BP LEFT ARM: NORMAL MMHG
BH CV VAS BP RIGHT ARM: NORMAL MMHG
BH CV XLRA MEAS - MID AO DIAM: 1.75 CM
BH CV XLRA MEAS - PAD LEFT ABI DP: 1.11
BH CV XLRA MEAS - PAD LEFT ABI PT: 1.12
BH CV XLRA MEAS - PAD LEFT ARM: 131 MMHG
BH CV XLRA MEAS - PAD LEFT LEG DP: 146 MMHG
BH CV XLRA MEAS - PAD LEFT LEG PT: 148 MMHG
BH CV XLRA MEAS - PAD RIGHT ABI DP: 1.09
BH CV XLRA MEAS - PAD RIGHT ABI PT: 1.14
BH CV XLRA MEAS - PAD RIGHT ARM: 124 MMHG
BH CV XLRA MEAS - PAD RIGHT LEG DP: 144 MMHG
BH CV XLRA MEAS - PAD RIGHT LEG PT: 150 MMHG
BH CV XLRA MEAS - PROX AO DIAM: 1.92 CM
BH CV XLRA MEAS LEFT ICA/CCA RATIO: 1.04
BH CV XLRA MEAS LEFT MID CCA PSV: NORMAL CM/SEC
BH CV XLRA MEAS LEFT MID ICA PSV: NORMAL CM/SEC
BH CV XLRA MEAS LEFT PROX ECA PSV: NORMAL CM/SEC
BH CV XLRA MEAS RIGHT ICA/CCA RATIO: 0.79
BH CV XLRA MEAS RIGHT MID CCA PSV: NORMAL CM/SEC
BH CV XLRA MEAS RIGHT MID ICA PSV: NORMAL CM/SEC
BH CV XLRA MEAS RIGHT PROX ECA PSV: NORMAL CM/SEC

## 2018-05-23 PROCEDURE — 93799 UNLISTED CV SVC/PROCEDURE: CPT

## 2018-06-05 DIAGNOSIS — E78.2 MIXED HYPERLIPIDEMIA: ICD-10-CM

## 2018-06-05 DIAGNOSIS — R79.89 ELEVATED SERUM CREATININE: Primary | ICD-10-CM

## 2018-06-05 DIAGNOSIS — I10 ESSENTIAL HYPERTENSION: ICD-10-CM

## 2018-06-06 LAB
BUN SERPL-MCNC: 14 MG/DL (ref 8–23)
BUN/CREAT SERPL: 11 (ref 7–25)
CALCIUM SERPL-MCNC: 9.5 MG/DL (ref 8.6–10.5)
CHLORIDE SERPL-SCNC: 102 MMOL/L (ref 98–107)
CO2 SERPL-SCNC: 25.5 MMOL/L (ref 22–29)
CREAT SERPL-MCNC: 1.27 MG/DL (ref 0.76–1.27)
GFR SERPLBLD CREATININE-BSD FMLA CKD-EPI: 56 ML/MIN/1.73
GFR SERPLBLD CREATININE-BSD FMLA CKD-EPI: 68 ML/MIN/1.73
GLUCOSE SERPL-MCNC: 135 MG/DL (ref 65–99)
POTASSIUM SERPL-SCNC: 4.5 MMOL/L (ref 3.5–5.2)
SODIUM SERPL-SCNC: 139 MMOL/L (ref 136–145)

## 2018-07-17 ENCOUNTER — APPOINTMENT (OUTPATIENT)
Dept: CT IMAGING | Facility: HOSPITAL | Age: 68
End: 2018-07-17

## 2018-07-17 ENCOUNTER — APPOINTMENT (OUTPATIENT)
Dept: GENERAL RADIOLOGY | Facility: HOSPITAL | Age: 68
End: 2018-07-17

## 2018-07-17 ENCOUNTER — HOSPITAL ENCOUNTER (INPATIENT)
Facility: HOSPITAL | Age: 68
LOS: 3 days | Discharge: HOME HEALTH | End: 2018-07-22
Attending: EMERGENCY MEDICINE | Admitting: INTERNAL MEDICINE

## 2018-07-17 DIAGNOSIS — S42.031A CLOSED DISPLACED FRACTURE OF ACROMIAL END OF RIGHT CLAVICLE, INITIAL ENCOUNTER: ICD-10-CM

## 2018-07-17 DIAGNOSIS — R55 SYNCOPE, UNSPECIFIED SYNCOPE TYPE: Primary | ICD-10-CM

## 2018-07-17 DIAGNOSIS — S01.01XA LACERATION OF SCALP, INITIAL ENCOUNTER: ICD-10-CM

## 2018-07-17 LAB
ALBUMIN SERPL-MCNC: 4.5 G/DL (ref 3.5–5.2)
ALBUMIN/GLOB SERPL: 1.7 G/DL
ALP SERPL-CCNC: 55 U/L (ref 39–117)
ALT SERPL W P-5'-P-CCNC: 27 U/L (ref 1–41)
ANION GAP SERPL CALCULATED.3IONS-SCNC: 15.3 MMOL/L
AST SERPL-CCNC: 24 U/L (ref 1–40)
BASOPHILS # BLD AUTO: 0.03 10*3/MM3 (ref 0–0.2)
BASOPHILS NFR BLD AUTO: 0.4 % (ref 0–1.5)
BILIRUB SERPL-MCNC: 0.5 MG/DL (ref 0.1–1.2)
BUN BLD-MCNC: 26 MG/DL (ref 8–23)
BUN/CREAT SERPL: 17.8 (ref 7–25)
CALCIUM SPEC-SCNC: 10.9 MG/DL (ref 8.6–10.5)
CHLORIDE SERPL-SCNC: 100 MMOL/L (ref 98–107)
CO2 SERPL-SCNC: 24.7 MMOL/L (ref 22–29)
CREAT BLD-MCNC: 1.46 MG/DL (ref 0.76–1.27)
DEPRECATED RDW RBC AUTO: 45.4 FL (ref 37–54)
EOSINOPHIL # BLD AUTO: 0.11 10*3/MM3 (ref 0–0.7)
EOSINOPHIL NFR BLD AUTO: 1.3 % (ref 0.3–6.2)
ERYTHROCYTE [DISTWIDTH] IN BLOOD BY AUTOMATED COUNT: 13.6 % (ref 11.5–14.5)
ETHANOL BLD-MCNC: 128 MG/DL (ref 0–10)
ETHANOL UR QL: 0.13 %
GFR SERPL CREATININE-BSD FRML MDRD: 48 ML/MIN/1.73
GLOBULIN UR ELPH-MCNC: 2.6 GM/DL
GLUCOSE BLD-MCNC: 112 MG/DL (ref 65–99)
HCT VFR BLD AUTO: 39.3 % (ref 40.4–52.2)
HGB BLD-MCNC: 14.1 G/DL (ref 13.7–17.6)
IMM GRANULOCYTES # BLD: 0.03 10*3/MM3 (ref 0–0.03)
IMM GRANULOCYTES NFR BLD: 0.4 % (ref 0–0.5)
LYMPHOCYTES # BLD AUTO: 0.49 10*3/MM3 (ref 0.9–4.8)
LYMPHOCYTES NFR BLD AUTO: 5.8 % (ref 19.6–45.3)
MCH RBC QN AUTO: 32.9 PG (ref 27–32.7)
MCHC RBC AUTO-ENTMCNC: 35.9 G/DL (ref 32.6–36.4)
MCV RBC AUTO: 91.6 FL (ref 79.8–96.2)
MONOCYTES # BLD AUTO: 0.84 10*3/MM3 (ref 0.2–1.2)
MONOCYTES NFR BLD AUTO: 10 % (ref 5–12)
NEUTROPHILS # BLD AUTO: 6.96 10*3/MM3 (ref 1.9–8.1)
NEUTROPHILS NFR BLD AUTO: 82.5 % (ref 42.7–76)
PLATELET # BLD AUTO: 191 10*3/MM3 (ref 140–500)
PMV BLD AUTO: 8.9 FL (ref 6–12)
POTASSIUM BLD-SCNC: 3.6 MMOL/L (ref 3.5–5.2)
PROT SERPL-MCNC: 7.1 G/DL (ref 6–8.5)
RBC # BLD AUTO: 4.29 10*6/MM3 (ref 4.6–6)
SODIUM BLD-SCNC: 140 MMOL/L (ref 136–145)
TROPONIN T SERPL-MCNC: <0.01 NG/ML (ref 0–0.03)
WBC NRBC COR # BLD: 8.43 10*3/MM3 (ref 4.5–10.7)

## 2018-07-17 PROCEDURE — 93010 ELECTROCARDIOGRAM REPORT: CPT | Performed by: INTERNAL MEDICINE

## 2018-07-17 PROCEDURE — 80307 DRUG TEST PRSMV CHEM ANLYZR: CPT | Performed by: PHYSICIAN ASSISTANT

## 2018-07-17 PROCEDURE — 72125 CT NECK SPINE W/O DYE: CPT

## 2018-07-17 PROCEDURE — 25010000002 ONDANSETRON PER 1 MG: Performed by: PHYSICIAN ASSISTANT

## 2018-07-17 PROCEDURE — 70450 CT HEAD/BRAIN W/O DYE: CPT

## 2018-07-17 PROCEDURE — 99284 EMERGENCY DEPT VISIT MOD MDM: CPT

## 2018-07-17 PROCEDURE — 93005 ELECTROCARDIOGRAM TRACING: CPT | Performed by: PHYSICIAN ASSISTANT

## 2018-07-17 PROCEDURE — 25010000002 MORPHINE PER 10 MG: Performed by: PHYSICIAN ASSISTANT

## 2018-07-17 PROCEDURE — 80053 COMPREHEN METABOLIC PANEL: CPT | Performed by: PHYSICIAN ASSISTANT

## 2018-07-17 PROCEDURE — 85025 COMPLETE CBC W/AUTO DIFF WBC: CPT | Performed by: PHYSICIAN ASSISTANT

## 2018-07-17 PROCEDURE — 73000 X-RAY EXAM OF COLLAR BONE: CPT

## 2018-07-17 PROCEDURE — 84484 ASSAY OF TROPONIN QUANT: CPT | Performed by: PHYSICIAN ASSISTANT

## 2018-07-17 RX ORDER — LIDOCAINE HYDROCHLORIDE AND EPINEPHRINE 10; 10 MG/ML; UG/ML
20 INJECTION, SOLUTION INFILTRATION; PERINEURAL ONCE
Status: COMPLETED | OUTPATIENT
Start: 2018-07-17 | End: 2018-07-17

## 2018-07-17 RX ORDER — CHOLECALCIFEROL (VITAMIN D3) 125 MCG
5 CAPSULE ORAL NIGHTLY PRN
COMMUNITY
End: 2019-05-09

## 2018-07-17 RX ORDER — ONDANSETRON 2 MG/ML
4 INJECTION INTRAMUSCULAR; INTRAVENOUS ONCE
Status: COMPLETED | OUTPATIENT
Start: 2018-07-17 | End: 2018-07-17

## 2018-07-17 RX ORDER — SODIUM CHLORIDE 0.9 % (FLUSH) 0.9 %
10 SYRINGE (ML) INJECTION AS NEEDED
Status: DISCONTINUED | OUTPATIENT
Start: 2018-07-17 | End: 2018-07-22 | Stop reason: HOSPADM

## 2018-07-17 RX ADMIN — SODIUM CHLORIDE 1000 ML: 9 INJECTION, SOLUTION INTRAVENOUS at 22:02

## 2018-07-17 RX ADMIN — LIDOCAINE HYDROCHLORIDE,EPINEPHRINE BITARTRATE 20 ML: 10; .01 INJECTION, SOLUTION INFILTRATION; PERINEURAL at 23:18

## 2018-07-17 RX ADMIN — ONDANSETRON 4 MG: 2 INJECTION INTRAMUSCULAR; INTRAVENOUS at 23:25

## 2018-07-17 RX ADMIN — MORPHINE SULFATE 4 MG: 4 INJECTION INTRAVENOUS at 23:25

## 2018-07-18 ENCOUNTER — APPOINTMENT (OUTPATIENT)
Dept: CARDIOLOGY | Facility: HOSPITAL | Age: 68
End: 2018-07-18
Attending: INTERNAL MEDICINE

## 2018-07-18 PROBLEM — S01.01XA SCALP LACERATION: Status: ACTIVE | Noted: 2018-07-18

## 2018-07-18 PROBLEM — N17.9 ARF (ACUTE RENAL FAILURE) (HCC): Status: ACTIVE | Noted: 2018-07-18

## 2018-07-18 PROBLEM — R55 SYNCOPE: Status: ACTIVE | Noted: 2018-07-18

## 2018-07-18 LAB — D DIMER PPP FEU-MCNC: 0.6 MCGFEU/ML (ref 0–0.49)

## 2018-07-18 PROCEDURE — G0378 HOSPITAL OBSERVATION PER HR: HCPCS

## 2018-07-18 PROCEDURE — 85379 FIBRIN DEGRADATION QUANT: CPT | Performed by: INTERNAL MEDICINE

## 2018-07-18 PROCEDURE — 99204 OFFICE O/P NEW MOD 45 MIN: CPT | Performed by: INTERNAL MEDICINE

## 2018-07-18 PROCEDURE — 93306 TTE W/DOPPLER COMPLETE: CPT

## 2018-07-18 PROCEDURE — 94799 UNLISTED PULMONARY SVC/PX: CPT

## 2018-07-18 PROCEDURE — 93306 TTE W/DOPPLER COMPLETE: CPT | Performed by: INTERNAL MEDICINE

## 2018-07-18 RX ORDER — HYDROCODONE BITARTRATE AND ACETAMINOPHEN 5; 325 MG/1; MG/1
1 TABLET ORAL EVERY 6 HOURS PRN
Status: DISCONTINUED | OUTPATIENT
Start: 2018-07-18 | End: 2018-07-21

## 2018-07-18 RX ORDER — SODIUM CHLORIDE 0.9 % (FLUSH) 0.9 %
1-10 SYRINGE (ML) INJECTION AS NEEDED
Status: DISCONTINUED | OUTPATIENT
Start: 2018-07-18 | End: 2018-07-22 | Stop reason: HOSPADM

## 2018-07-18 RX ORDER — ASPIRIN 81 MG/1
81 TABLET ORAL DAILY
Status: DISCONTINUED | OUTPATIENT
Start: 2018-07-18 | End: 2018-07-22 | Stop reason: HOSPADM

## 2018-07-18 RX ORDER — ACETAMINOPHEN 325 MG/1
650 TABLET ORAL EVERY 4 HOURS PRN
Status: DISCONTINUED | OUTPATIENT
Start: 2018-07-18 | End: 2018-07-22 | Stop reason: HOSPADM

## 2018-07-18 RX ORDER — FAMOTIDINE 20 MG/1
20 TABLET, FILM COATED ORAL 2 TIMES DAILY
Status: DISCONTINUED | OUTPATIENT
Start: 2018-07-18 | End: 2018-07-22 | Stop reason: HOSPADM

## 2018-07-18 RX ORDER — NIFEDIPINE 60 MG/1
60 TABLET, EXTENDED RELEASE ORAL
Status: DISCONTINUED | OUTPATIENT
Start: 2018-07-18 | End: 2018-07-22 | Stop reason: HOSPADM

## 2018-07-18 RX ORDER — ONDANSETRON 4 MG/1
4 TABLET, FILM COATED ORAL EVERY 6 HOURS PRN
Status: DISCONTINUED | OUTPATIENT
Start: 2018-07-18 | End: 2018-07-22 | Stop reason: HOSPADM

## 2018-07-18 RX ORDER — SODIUM CHLORIDE 9 MG/ML
100 INJECTION, SOLUTION INTRAVENOUS CONTINUOUS
Status: DISCONTINUED | OUTPATIENT
Start: 2018-07-18 | End: 2018-07-20

## 2018-07-18 RX ORDER — ACETAMINOPHEN 325 MG/1
650 TABLET ORAL EVERY 4 HOURS PRN
Status: DISCONTINUED | OUTPATIENT
Start: 2018-07-18 | End: 2018-07-18

## 2018-07-18 RX ORDER — ONDANSETRON 2 MG/ML
4 INJECTION INTRAMUSCULAR; INTRAVENOUS EVERY 6 HOURS PRN
Status: DISCONTINUED | OUTPATIENT
Start: 2018-07-18 | End: 2018-07-22 | Stop reason: HOSPADM

## 2018-07-18 RX ORDER — ATORVASTATIN CALCIUM 20 MG/1
20 TABLET, FILM COATED ORAL DAILY
Status: DISCONTINUED | OUTPATIENT
Start: 2018-07-18 | End: 2018-07-22 | Stop reason: HOSPADM

## 2018-07-18 RX ORDER — NITROGLYCERIN 0.4 MG/1
0.4 TABLET SUBLINGUAL
Status: DISCONTINUED | OUTPATIENT
Start: 2018-07-18 | End: 2018-07-22 | Stop reason: HOSPADM

## 2018-07-18 RX ORDER — ONDANSETRON 4 MG/1
4 TABLET, ORALLY DISINTEGRATING ORAL EVERY 6 HOURS PRN
Status: DISCONTINUED | OUTPATIENT
Start: 2018-07-18 | End: 2018-07-22 | Stop reason: HOSPADM

## 2018-07-18 RX ADMIN — FAMOTIDINE 20 MG: 20 TABLET, FILM COATED ORAL at 09:10

## 2018-07-18 RX ADMIN — HYDROCODONE BITARTRATE AND ACETAMINOPHEN 1 TABLET: 5; 325 TABLET ORAL at 23:06

## 2018-07-18 RX ADMIN — SODIUM CHLORIDE 100 ML/HR: 9 INJECTION, SOLUTION INTRAVENOUS at 13:03

## 2018-07-18 RX ADMIN — ATORVASTATIN CALCIUM 20 MG: 20 TABLET, FILM COATED ORAL at 09:10

## 2018-07-18 RX ADMIN — VERAPAMIL HYDROCHLORIDE 120 MG: 120 TABLET, FILM COATED, EXTENDED RELEASE ORAL at 09:55

## 2018-07-18 RX ADMIN — VERAPAMIL HYDROCHLORIDE 180 MG: 180 TABLET, FILM COATED, EXTENDED RELEASE ORAL at 15:49

## 2018-07-18 RX ADMIN — ASPIRIN 81 MG: 81 TABLET ORAL at 09:10

## 2018-07-18 RX ADMIN — FAMOTIDINE 20 MG: 20 TABLET, FILM COATED ORAL at 21:53

## 2018-07-18 RX ADMIN — ACETAMINOPHEN 650 MG: 325 TABLET, FILM COATED ORAL at 04:52

## 2018-07-18 RX ADMIN — ACETAMINOPHEN 650 MG: 325 TABLET, FILM COATED ORAL at 17:43

## 2018-07-18 RX ADMIN — SODIUM CHLORIDE 100 ML/HR: 9 INJECTION, SOLUTION INTRAVENOUS at 23:06

## 2018-07-19 ENCOUNTER — APPOINTMENT (OUTPATIENT)
Dept: CT IMAGING | Facility: HOSPITAL | Age: 68
End: 2018-07-19
Attending: INTERNAL MEDICINE

## 2018-07-19 ENCOUNTER — TELEPHONE (OUTPATIENT)
Dept: INTERNAL MEDICINE | Facility: CLINIC | Age: 68
End: 2018-07-19

## 2018-07-19 LAB
ALBUMIN SERPL-MCNC: 3.6 G/DL (ref 3.5–5.2)
ANION GAP SERPL CALCULATED.3IONS-SCNC: 12.6 MMOL/L
AORTIC DIMENSIONLESS INDEX: 0.8 (DI)
BH CV ECHO MEAS - ACS: 2.3 CM
BH CV ECHO MEAS - AO MAX PG (FULL): -0.78 MMHG
BH CV ECHO MEAS - AO MAX PG: 5.6 MMHG
BH CV ECHO MEAS - AO MEAN PG (FULL): 0 MMHG
BH CV ECHO MEAS - AO MEAN PG: 3 MMHG
BH CV ECHO MEAS - AO ROOT AREA (BSA CORRECTED): 1.6
BH CV ECHO MEAS - AO ROOT AREA: 10.2 CM^2
BH CV ECHO MEAS - AO ROOT DIAM: 3.6 CM
BH CV ECHO MEAS - AO V2 MAX: 118 CM/SEC
BH CV ECHO MEAS - AO V2 MEAN: 76.1 CM/SEC
BH CV ECHO MEAS - AO V2 VTI: 22.1 CM
BH CV ECHO MEAS - AVA(I,A): 3.4 CM^2
BH CV ECHO MEAS - AVA(I,D): 3.4 CM^2
BH CV ECHO MEAS - AVA(V,A): 4.4 CM^2
BH CV ECHO MEAS - AVA(V,D): 4.4 CM^2
BH CV ECHO MEAS - BSA(HAYCOCK): 2.3 M^2
BH CV ECHO MEAS - BSA: 2.3 M^2
BH CV ECHO MEAS - BZI_BMI: 32.8 KILOGRAMS/M^2
BH CV ECHO MEAS - BZI_METRIC_HEIGHT: 180.3 CM
BH CV ECHO MEAS - BZI_METRIC_WEIGHT: 106.6 KG
BH CV ECHO MEAS - CONTRAST EF (2CH): 63.3 ML/M^2
BH CV ECHO MEAS - CONTRAST EF 4CH: 68.8 ML/M^2
BH CV ECHO MEAS - EDV(CUBED): 140.6 ML
BH CV ECHO MEAS - EDV(MOD-SP2): 120 ML
BH CV ECHO MEAS - EDV(MOD-SP4): 109 ML
BH CV ECHO MEAS - EDV(TEICH): 129.5 ML
BH CV ECHO MEAS - EF(CUBED): 82.7 %
BH CV ECHO MEAS - EF(MOD-BP): 67 %
BH CV ECHO MEAS - EF(MOD-SP2): 63.3 %
BH CV ECHO MEAS - EF(MOD-SP4): 68.8 %
BH CV ECHO MEAS - EF(TEICH): 75.1 %
BH CV ECHO MEAS - ESV(CUBED): 24.4 ML
BH CV ECHO MEAS - ESV(MOD-SP2): 44 ML
BH CV ECHO MEAS - ESV(MOD-SP4): 34 ML
BH CV ECHO MEAS - ESV(TEICH): 32.2 ML
BH CV ECHO MEAS - FS: 44.2 %
BH CV ECHO MEAS - IVS/LVPW: 1
BH CV ECHO MEAS - IVSD: 0.9 CM
BH CV ECHO MEAS - LAT PEAK E' VEL: 10 CM/SEC
BH CV ECHO MEAS - LV DIASTOLIC VOL/BSA (35-75): 48.3 ML/M^2
BH CV ECHO MEAS - LV MASS(C)D: 169 GRAMS
BH CV ECHO MEAS - LV MASS(C)DI: 74.8 GRAMS/M^2
BH CV ECHO MEAS - LV MAX PG: 6.4 MMHG
BH CV ECHO MEAS - LV MEAN PG: 3 MMHG
BH CV ECHO MEAS - LV SYSTOLIC VOL/BSA (12-30): 15.1 ML/M^2
BH CV ECHO MEAS - LV V1 MAX: 126 CM/SEC
BH CV ECHO MEAS - LV V1 MEAN: 75.1 CM/SEC
BH CV ECHO MEAS - LV V1 VTI: 17.9 CM
BH CV ECHO MEAS - LVIDD: 5.2 CM
BH CV ECHO MEAS - LVIDS: 2.9 CM
BH CV ECHO MEAS - LVLD AP2: 8.6 CM
BH CV ECHO MEAS - LVLD AP4: 8.4 CM
BH CV ECHO MEAS - LVLS AP2: 7.3 CM
BH CV ECHO MEAS - LVLS AP4: 7.1 CM
BH CV ECHO MEAS - LVOT AREA (M): 4.2 CM^2
BH CV ECHO MEAS - LVOT AREA: 4.2 CM^2
BH CV ECHO MEAS - LVOT DIAM: 2.3 CM
BH CV ECHO MEAS - LVPWD: 0.9 CM
BH CV ECHO MEAS - MED PEAK E' VEL: 9 CM/SEC
BH CV ECHO MEAS - MV A DUR: 0.15 SEC
BH CV ECHO MEAS - MV A MAX VEL: 113 CM/SEC
BH CV ECHO MEAS - MV DEC SLOPE: 478 CM/SEC^2
BH CV ECHO MEAS - MV DEC TIME: 0.21 SEC
BH CV ECHO MEAS - MV E MAX VEL: 85.9 CM/SEC
BH CV ECHO MEAS - MV E/A: 0.76
BH CV ECHO MEAS - MV MAX PG: 5.7 MMHG
BH CV ECHO MEAS - MV MEAN PG: 3 MMHG
BH CV ECHO MEAS - MV P1/2T MAX VEL: 99.3 CM/SEC
BH CV ECHO MEAS - MV P1/2T: 60.8 MSEC
BH CV ECHO MEAS - MV V2 MAX: 119 CM/SEC
BH CV ECHO MEAS - MV V2 MEAN: 74.9 CM/SEC
BH CV ECHO MEAS - MV V2 VTI: 26 CM
BH CV ECHO MEAS - MVA P1/2T LCG: 2.2 CM^2
BH CV ECHO MEAS - MVA(P1/2T): 3.6 CM^2
BH CV ECHO MEAS - MVA(VTI): 2.9 CM^2
BH CV ECHO MEAS - PA ACC TIME: 0.07 SEC
BH CV ECHO MEAS - PA MAX PG (FULL): 7.8 MMHG
BH CV ECHO MEAS - PA MAX PG: 10.8 MMHG
BH CV ECHO MEAS - PA PR(ACCEL): 48.9 MMHG
BH CV ECHO MEAS - PA V2 MAX: 164 CM/SEC
BH CV ECHO MEAS - PULM A REVS DUR: 0.16 SEC
BH CV ECHO MEAS - PULM A REVS VEL: 36 CM/SEC
BH CV ECHO MEAS - PULM DIAS VEL: 45.8 CM/SEC
BH CV ECHO MEAS - PULM S/D: 1.8
BH CV ECHO MEAS - PULM SYS VEL: 83.7 CM/SEC
BH CV ECHO MEAS - PVA(V,A): 2.2 CM^2
BH CV ECHO MEAS - PVA(V,D): 2.2 CM^2
BH CV ECHO MEAS - QP/QS: 0.9
BH CV ECHO MEAS - RV MAX PG: 2.9 MMHG
BH CV ECHO MEAS - RV MEAN PG: 1 MMHG
BH CV ECHO MEAS - RV V1 MAX: 85.7 CM/SEC
BH CV ECHO MEAS - RV V1 MEAN: 54.5 CM/SEC
BH CV ECHO MEAS - RV V1 VTI: 16.1 CM
BH CV ECHO MEAS - RVOT AREA: 4.2 CM^2
BH CV ECHO MEAS - RVOT DIAM: 2.3 CM
BH CV ECHO MEAS - SI(AO): 99.6 ML/M^2
BH CV ECHO MEAS - SI(CUBED): 51.5 ML/M^2
BH CV ECHO MEAS - SI(LVOT): 32.9 ML/M^2
BH CV ECHO MEAS - SI(MOD-SP2): 33.7 ML/M^2
BH CV ECHO MEAS - SI(MOD-SP4): 33.2 ML/M^2
BH CV ECHO MEAS - SI(TEICH): 43.1 ML/M^2
BH CV ECHO MEAS - SV(AO): 225 ML
BH CV ECHO MEAS - SV(CUBED): 116.2 ML
BH CV ECHO MEAS - SV(LVOT): 74.4 ML
BH CV ECHO MEAS - SV(MOD-SP2): 76 ML
BH CV ECHO MEAS - SV(MOD-SP4): 75 ML
BH CV ECHO MEAS - SV(RVOT): 66.9 ML
BH CV ECHO MEAS - SV(TEICH): 97.3 ML
BH CV ECHO MEAS - TAPSE (>1.6): 1.9 CM2
BH CV ECHO MEASUREMENTS AVERAGE E/E' RATIO: 9.04
BH CV VAS BP LEFT ARM: NORMAL MMHG
BH CV XLRA - RV BASE: 3.2 CM
BH CV XLRA - TDI S': 20 CM/SEC
BUN BLD-MCNC: 21 MG/DL (ref 8–23)
BUN/CREAT SERPL: 16.9 (ref 7–25)
CALCIUM SPEC-SCNC: 9 MG/DL (ref 8.6–10.5)
CHLORIDE SERPL-SCNC: 100 MMOL/L (ref 98–107)
CO2 SERPL-SCNC: 21.4 MMOL/L (ref 22–29)
CREAT BLD-MCNC: 1.24 MG/DL (ref 0.76–1.27)
DEPRECATED RDW RBC AUTO: 46.2 FL (ref 37–54)
ERYTHROCYTE [DISTWIDTH] IN BLOOD BY AUTOMATED COUNT: 13.6 % (ref 11.5–14.5)
FOLATE SERPL-MCNC: 14.04 NG/ML (ref 4.78–24.2)
GFR SERPL CREATININE-BSD FRML MDRD: 58 ML/MIN/1.73
GLUCOSE BLD-MCNC: 107 MG/DL (ref 65–99)
HCT VFR BLD AUTO: 36.3 % (ref 40.4–52.2)
HGB BLD-MCNC: 12.2 G/DL (ref 13.7–17.6)
LEFT ATRIUM VOLUME INDEX: 28 ML/M2
LEFT ATRIUM VOLUME: 61 CM3
LV EF 2D ECHO EST: 67 %
MAGNESIUM SERPL-MCNC: 2 MG/DL (ref 1.6–2.4)
MAXIMAL PREDICTED HEART RATE: 151 BPM
MCH RBC QN AUTO: 31.6 PG (ref 27–32.7)
MCHC RBC AUTO-ENTMCNC: 33.6 G/DL (ref 32.6–36.4)
MCV RBC AUTO: 94 FL (ref 79.8–96.2)
PHOSPHATE SERPL-MCNC: 2.3 MG/DL (ref 2.5–4.5)
PLATELET # BLD AUTO: 174 10*3/MM3 (ref 140–500)
PMV BLD AUTO: 9.6 FL (ref 6–12)
POTASSIUM BLD-SCNC: 3.8 MMOL/L (ref 3.5–5.2)
RBC # BLD AUTO: 3.86 10*6/MM3 (ref 4.6–6)
SODIUM BLD-SCNC: 134 MMOL/L (ref 136–145)
STRESS TARGET HR: 128 BPM
TSH SERPL DL<=0.05 MIU/L-ACNC: 1.79 MIU/ML (ref 0.27–4.2)
VIT B12 BLD-MCNC: 476 PG/ML (ref 211–946)
WBC NRBC COR # BLD: 6.02 10*3/MM3 (ref 4.5–10.7)

## 2018-07-19 PROCEDURE — 71275 CT ANGIOGRAPHY CHEST: CPT

## 2018-07-19 PROCEDURE — 84443 ASSAY THYROID STIM HORMONE: CPT | Performed by: HOSPITALIST

## 2018-07-19 PROCEDURE — 82607 VITAMIN B-12: CPT | Performed by: HOSPITALIST

## 2018-07-19 PROCEDURE — 0 IOPAMIDOL PER 1 ML: Performed by: HOSPITALIST

## 2018-07-19 PROCEDURE — 83735 ASSAY OF MAGNESIUM: CPT | Performed by: HOSPITALIST

## 2018-07-19 PROCEDURE — 99213 OFFICE O/P EST LOW 20 MIN: CPT | Performed by: INTERNAL MEDICINE

## 2018-07-19 PROCEDURE — 85027 COMPLETE CBC AUTOMATED: CPT | Performed by: HOSPITALIST

## 2018-07-19 PROCEDURE — 80069 RENAL FUNCTION PANEL: CPT | Performed by: HOSPITALIST

## 2018-07-19 PROCEDURE — 82746 ASSAY OF FOLIC ACID SERUM: CPT | Performed by: HOSPITALIST

## 2018-07-19 RX ORDER — CARVEDILOL 6.25 MG/1
6.25 TABLET ORAL EVERY 12 HOURS SCHEDULED
Status: DISCONTINUED | OUTPATIENT
Start: 2018-07-19 | End: 2018-07-22 | Stop reason: HOSPADM

## 2018-07-19 RX ORDER — LIDOCAINE 50 MG/G
1 PATCH TOPICAL
Status: DISCONTINUED | OUTPATIENT
Start: 2018-07-19 | End: 2018-07-22 | Stop reason: HOSPADM

## 2018-07-19 RX ADMIN — FAMOTIDINE 20 MG: 20 TABLET, FILM COATED ORAL at 08:40

## 2018-07-19 RX ADMIN — ASPIRIN 81 MG: 81 TABLET ORAL at 08:40

## 2018-07-19 RX ADMIN — ATORVASTATIN CALCIUM 20 MG: 20 TABLET, FILM COATED ORAL at 08:40

## 2018-07-19 RX ADMIN — CARVEDILOL 6.25 MG: 6.25 TABLET, FILM COATED ORAL at 16:31

## 2018-07-19 RX ADMIN — HYDROCODONE BITARTRATE AND ACETAMINOPHEN 1 TABLET: 5; 325 TABLET ORAL at 22:33

## 2018-07-19 RX ADMIN — IOPAMIDOL 95 ML: 755 INJECTION, SOLUTION INTRAVENOUS at 14:13

## 2018-07-19 RX ADMIN — SODIUM CHLORIDE 100 ML/HR: 9 INJECTION, SOLUTION INTRAVENOUS at 09:35

## 2018-07-19 RX ADMIN — CARVEDILOL 6.25 MG: 6.25 TABLET, FILM COATED ORAL at 22:31

## 2018-07-19 RX ADMIN — NIFEDIPINE 60 MG: 60 TABLET, FILM COATED, EXTENDED RELEASE ORAL at 08:40

## 2018-07-19 RX ADMIN — SODIUM CHLORIDE 100 ML/HR: 9 INJECTION, SOLUTION INTRAVENOUS at 21:00

## 2018-07-19 RX ADMIN — HYDROCODONE BITARTRATE AND ACETAMINOPHEN 1 TABLET: 5; 325 TABLET ORAL at 08:40

## 2018-07-19 RX ADMIN — LIDOCAINE 1 PATCH: 50 PATCH CUTANEOUS at 16:32

## 2018-07-19 RX ADMIN — HYDROCODONE BITARTRATE AND ACETAMINOPHEN 1 TABLET: 5; 325 TABLET ORAL at 16:37

## 2018-07-19 RX ADMIN — FAMOTIDINE 20 MG: 20 TABLET, FILM COATED ORAL at 22:31

## 2018-07-19 NOTE — TELEPHONE ENCOUNTER
Pt called for update, his fall was on Tuesday evening, broke his clavicle. Pt stated he blanked out, fell backwards three or four steps in his house. Cardiologist is running some cardiac tests and surgery is scheduled to fix the fractures in the bones of the clavicle, since it will not heal correctly on its own.

## 2018-07-19 NOTE — TELEPHONE ENCOUNTER
I am aware.  I have been reviewing the reports as they come in.  Thanks for keeping us in the loop.

## 2018-07-20 ENCOUNTER — APPOINTMENT (OUTPATIENT)
Dept: GENERAL RADIOLOGY | Facility: HOSPITAL | Age: 68
End: 2018-07-20

## 2018-07-20 ENCOUNTER — APPOINTMENT (OUTPATIENT)
Dept: GENERAL RADIOLOGY | Facility: HOSPITAL | Age: 68
End: 2018-07-20
Attending: ORTHOPAEDIC SURGERY

## 2018-07-20 ENCOUNTER — ANESTHESIA EVENT (OUTPATIENT)
Dept: PERIOP | Facility: HOSPITAL | Age: 68
End: 2018-07-20

## 2018-07-20 ENCOUNTER — ANESTHESIA (OUTPATIENT)
Dept: PERIOP | Facility: HOSPITAL | Age: 68
End: 2018-07-20

## 2018-07-20 PROBLEM — S42.031A CLOSED DISPLACED FRACTURE OF LATERAL END OF RIGHT CLAVICLE: Status: ACTIVE | Noted: 2018-07-17

## 2018-07-20 LAB
ALBUMIN SERPL-MCNC: 3.6 G/DL (ref 3.5–5.2)
ANION GAP SERPL CALCULATED.3IONS-SCNC: 12.1 MMOL/L
BUN BLD-MCNC: 18 MG/DL (ref 8–23)
BUN/CREAT SERPL: 17.8 (ref 7–25)
CALCIUM SPEC-SCNC: 9 MG/DL (ref 8.6–10.5)
CHLORIDE SERPL-SCNC: 101 MMOL/L (ref 98–107)
CO2 SERPL-SCNC: 20.9 MMOL/L (ref 22–29)
CREAT BLD-MCNC: 1.01 MG/DL (ref 0.76–1.27)
GFR SERPL CREATININE-BSD FRML MDRD: 73 ML/MIN/1.73
GLUCOSE BLD-MCNC: 109 MG/DL (ref 65–99)
PHOSPHATE SERPL-MCNC: 2.6 MG/DL (ref 2.5–4.5)
POTASSIUM BLD-SCNC: 3.6 MMOL/L (ref 3.5–5.2)
SODIUM BLD-SCNC: 134 MMOL/L (ref 136–145)

## 2018-07-20 PROCEDURE — 73000 X-RAY EXAM OF COLLAR BONE: CPT

## 2018-07-20 PROCEDURE — 76000 FLUOROSCOPY <1 HR PHYS/QHP: CPT

## 2018-07-20 PROCEDURE — 25010000002 ONDANSETRON PER 1 MG: Performed by: NURSE ANESTHETIST, CERTIFIED REGISTERED

## 2018-07-20 PROCEDURE — C1713 ANCHOR/SCREW BN/BN,TIS/BN: HCPCS | Performed by: ORTHOPAEDIC SURGERY

## 2018-07-20 PROCEDURE — 25010000002 PROPOFOL 10 MG/ML EMULSION: Performed by: NURSE ANESTHETIST, CERTIFIED REGISTERED

## 2018-07-20 PROCEDURE — 25010000003 CEFAZOLIN 1-4 GM/50ML-% SOLUTION: Performed by: ORTHOPAEDIC SURGERY

## 2018-07-20 PROCEDURE — 0PS904Z REPOSITION RIGHT CLAVICLE WITH INTERNAL FIXATION DEVICE, OPEN APPROACH: ICD-10-PCS | Performed by: ORTHOPAEDIC SURGERY

## 2018-07-20 PROCEDURE — 25010000002 FENTANYL CITRATE (PF) 100 MCG/2ML SOLUTION: Performed by: ANESTHESIOLOGY

## 2018-07-20 PROCEDURE — 25010000002 PHENYLEPHRINE PER 1 ML: Performed by: NURSE ANESTHETIST, CERTIFIED REGISTERED

## 2018-07-20 PROCEDURE — 25010000002 MIDAZOLAM PER 1 MG: Performed by: ANESTHESIOLOGY

## 2018-07-20 PROCEDURE — 80069 RENAL FUNCTION PANEL: CPT | Performed by: HOSPITALIST

## 2018-07-20 PROCEDURE — 25010000002 DEXAMETHASONE PER 1 MG: Performed by: NURSE ANESTHETIST, CERTIFIED REGISTERED

## 2018-07-20 PROCEDURE — 71045 X-RAY EXAM CHEST 1 VIEW: CPT

## 2018-07-20 PROCEDURE — 25010000003 CEFAZOLIN IN DEXTROSE 2-4 GM/100ML-% SOLUTION: Performed by: NURSE ANESTHETIST, CERTIFIED REGISTERED

## 2018-07-20 PROCEDURE — 25010000002 FENTANYL CITRATE (PF) 100 MCG/2ML SOLUTION: Performed by: NURSE ANESTHETIST, CERTIFIED REGISTERED

## 2018-07-20 DEVICE — IMPLANTABLE DEVICE: Type: IMPLANTABLE DEVICE | Site: CLAVICLE | Status: FUNCTIONAL

## 2018-07-20 DEVICE — SCRW LK LP SS 3.5X14MM: Type: IMPLANTABLE DEVICE | Site: CLAVICLE | Status: FUNCTIONAL

## 2018-07-20 DEVICE — SCRW CORT LP SS 3.5X14MM: Type: IMPLANTABLE DEVICE | Site: CLAVICLE | Status: FUNCTIONAL

## 2018-07-20 DEVICE — SCRW LK LP SS 3.5X16MM: Type: IMPLANTABLE DEVICE | Site: CLAVICLE | Status: FUNCTIONAL

## 2018-07-20 DEVICE — SCRW CORT LP SS 3.5X12MM: Type: IMPLANTABLE DEVICE | Site: CLAVICLE | Status: FUNCTIONAL

## 2018-07-20 DEVICE — SCRW CORT LP SS 3.5X18MM: Type: IMPLANTABLE DEVICE | Site: CLAVICLE | Status: FUNCTIONAL

## 2018-07-20 RX ORDER — DEXAMETHASONE SODIUM PHOSPHATE 10 MG/ML
INJECTION INTRAMUSCULAR; INTRAVENOUS AS NEEDED
Status: DISCONTINUED | OUTPATIENT
Start: 2018-07-20 | End: 2018-07-20 | Stop reason: SURG

## 2018-07-20 RX ORDER — PROMETHAZINE HYDROCHLORIDE 25 MG/ML
12.5 INJECTION, SOLUTION INTRAMUSCULAR; INTRAVENOUS ONCE AS NEEDED
Status: DISCONTINUED | OUTPATIENT
Start: 2018-07-20 | End: 2018-07-20 | Stop reason: HOSPADM

## 2018-07-20 RX ORDER — EPHEDRINE SULFATE 50 MG/ML
5 INJECTION, SOLUTION INTRAVENOUS ONCE AS NEEDED
Status: DISCONTINUED | OUTPATIENT
Start: 2018-07-20 | End: 2018-07-20 | Stop reason: HOSPADM

## 2018-07-20 RX ORDER — DIPHENHYDRAMINE HYDROCHLORIDE 50 MG/ML
12.5 INJECTION INTRAMUSCULAR; INTRAVENOUS
Status: DISCONTINUED | OUTPATIENT
Start: 2018-07-20 | End: 2018-07-20 | Stop reason: HOSPADM

## 2018-07-20 RX ORDER — FENTANYL CITRATE 50 UG/ML
INJECTION, SOLUTION INTRAMUSCULAR; INTRAVENOUS AS NEEDED
Status: DISCONTINUED | OUTPATIENT
Start: 2018-07-20 | End: 2018-07-20 | Stop reason: SURG

## 2018-07-20 RX ORDER — BUPIVACAINE HYDROCHLORIDE 5 MG/ML
INJECTION, SOLUTION EPIDURAL; INTRACAUDAL AS NEEDED
Status: DISCONTINUED | OUTPATIENT
Start: 2018-07-20 | End: 2018-07-20 | Stop reason: SURG

## 2018-07-20 RX ORDER — MAGNESIUM HYDROXIDE 1200 MG/15ML
LIQUID ORAL AS NEEDED
Status: DISCONTINUED | OUTPATIENT
Start: 2018-07-20 | End: 2018-07-20 | Stop reason: HOSPADM

## 2018-07-20 RX ORDER — FAMOTIDINE 10 MG/ML
20 INJECTION, SOLUTION INTRAVENOUS ONCE
Status: COMPLETED | OUTPATIENT
Start: 2018-07-20 | End: 2018-07-20

## 2018-07-20 RX ORDER — NALOXONE HCL 0.4 MG/ML
0.2 VIAL (ML) INJECTION AS NEEDED
Status: DISCONTINUED | OUTPATIENT
Start: 2018-07-20 | End: 2018-07-20 | Stop reason: HOSPADM

## 2018-07-20 RX ORDER — HYDROMORPHONE HYDROCHLORIDE 1 MG/ML
0.5 INJECTION, SOLUTION INTRAMUSCULAR; INTRAVENOUS; SUBCUTANEOUS
Status: DISCONTINUED | OUTPATIENT
Start: 2018-07-20 | End: 2018-07-20 | Stop reason: HOSPADM

## 2018-07-20 RX ORDER — LIDOCAINE HYDROCHLORIDE 10 MG/ML
0.5 INJECTION, SOLUTION EPIDURAL; INFILTRATION; INTRACAUDAL; PERINEURAL ONCE AS NEEDED
Status: DISCONTINUED | OUTPATIENT
Start: 2018-07-20 | End: 2018-07-20 | Stop reason: HOSPADM

## 2018-07-20 RX ORDER — MIDAZOLAM HYDROCHLORIDE 1 MG/ML
1 INJECTION INTRAMUSCULAR; INTRAVENOUS
Status: DISCONTINUED | OUTPATIENT
Start: 2018-07-20 | End: 2018-07-20 | Stop reason: HOSPADM

## 2018-07-20 RX ORDER — MIDAZOLAM HYDROCHLORIDE 1 MG/ML
2 INJECTION INTRAMUSCULAR; INTRAVENOUS
Status: DISCONTINUED | OUTPATIENT
Start: 2018-07-20 | End: 2018-07-20 | Stop reason: HOSPADM

## 2018-07-20 RX ORDER — ONDANSETRON 2 MG/ML
INJECTION INTRAMUSCULAR; INTRAVENOUS AS NEEDED
Status: DISCONTINUED | OUTPATIENT
Start: 2018-07-20 | End: 2018-07-20 | Stop reason: SURG

## 2018-07-20 RX ORDER — SODIUM CHLORIDE, SODIUM LACTATE, POTASSIUM CHLORIDE, CALCIUM CHLORIDE 600; 310; 30; 20 MG/100ML; MG/100ML; MG/100ML; MG/100ML
9 INJECTION, SOLUTION INTRAVENOUS CONTINUOUS
Status: DISCONTINUED | OUTPATIENT
Start: 2018-07-20 | End: 2018-07-21

## 2018-07-20 RX ORDER — SENNA AND DOCUSATE SODIUM 50; 8.6 MG/1; MG/1
2 TABLET, FILM COATED ORAL NIGHTLY
Status: DISCONTINUED | OUTPATIENT
Start: 2018-07-20 | End: 2018-07-22 | Stop reason: HOSPADM

## 2018-07-20 RX ORDER — PROMETHAZINE HYDROCHLORIDE 25 MG/1
25 TABLET ORAL ONCE AS NEEDED
Status: DISCONTINUED | OUTPATIENT
Start: 2018-07-20 | End: 2018-07-20 | Stop reason: HOSPADM

## 2018-07-20 RX ORDER — LABETALOL HYDROCHLORIDE 5 MG/ML
5 INJECTION, SOLUTION INTRAVENOUS
Status: DISCONTINUED | OUTPATIENT
Start: 2018-07-20 | End: 2018-07-20 | Stop reason: HOSPADM

## 2018-07-20 RX ORDER — CEFAZOLIN SODIUM 1 G/50ML
1 INJECTION, SOLUTION INTRAVENOUS EVERY 8 HOURS
Status: DISCONTINUED | OUTPATIENT
Start: 2018-07-20 | End: 2018-07-22 | Stop reason: HOSPADM

## 2018-07-20 RX ORDER — ALBUTEROL SULFATE 2.5 MG/3ML
2.5 SOLUTION RESPIRATORY (INHALATION) ONCE AS NEEDED
Status: DISCONTINUED | OUTPATIENT
Start: 2018-07-20 | End: 2018-07-20 | Stop reason: HOSPADM

## 2018-07-20 RX ORDER — CEFAZOLIN SODIUM 2 G/100ML
INJECTION, SOLUTION INTRAVENOUS AS NEEDED
Status: DISCONTINUED | OUTPATIENT
Start: 2018-07-20 | End: 2018-07-20 | Stop reason: SURG

## 2018-07-20 RX ORDER — LIDOCAINE HYDROCHLORIDE 20 MG/ML
INJECTION, SOLUTION INFILTRATION; PERINEURAL AS NEEDED
Status: DISCONTINUED | OUTPATIENT
Start: 2018-07-20 | End: 2018-07-20 | Stop reason: SURG

## 2018-07-20 RX ORDER — ONDANSETRON 2 MG/ML
4 INJECTION INTRAMUSCULAR; INTRAVENOUS ONCE AS NEEDED
Status: DISCONTINUED | OUTPATIENT
Start: 2018-07-20 | End: 2018-07-20 | Stop reason: HOSPADM

## 2018-07-20 RX ORDER — PROMETHAZINE HYDROCHLORIDE 25 MG/1
25 SUPPOSITORY RECTAL ONCE AS NEEDED
Status: DISCONTINUED | OUTPATIENT
Start: 2018-07-20 | End: 2018-07-20 | Stop reason: HOSPADM

## 2018-07-20 RX ORDER — MEPERIDINE HYDROCHLORIDE 25 MG/ML
12.5 INJECTION INTRAMUSCULAR; INTRAVENOUS; SUBCUTANEOUS
Status: DISCONTINUED | OUTPATIENT
Start: 2018-07-20 | End: 2018-07-20 | Stop reason: HOSPADM

## 2018-07-20 RX ORDER — SODIUM CHLORIDE 0.9 % (FLUSH) 0.9 %
1-10 SYRINGE (ML) INJECTION AS NEEDED
Status: DISCONTINUED | OUTPATIENT
Start: 2018-07-20 | End: 2018-07-20 | Stop reason: HOSPADM

## 2018-07-20 RX ORDER — FENTANYL CITRATE 50 UG/ML
50 INJECTION, SOLUTION INTRAMUSCULAR; INTRAVENOUS
Status: DISCONTINUED | OUTPATIENT
Start: 2018-07-20 | End: 2018-07-20 | Stop reason: HOSPADM

## 2018-07-20 RX ORDER — PROPOFOL 10 MG/ML
VIAL (ML) INTRAVENOUS AS NEEDED
Status: DISCONTINUED | OUTPATIENT
Start: 2018-07-20 | End: 2018-07-20 | Stop reason: SURG

## 2018-07-20 RX ORDER — HYDROCODONE BITARTRATE AND ACETAMINOPHEN 5; 325 MG/1; MG/1
1 TABLET ORAL ONCE AS NEEDED
Status: COMPLETED | OUTPATIENT
Start: 2018-07-20 | End: 2018-07-20

## 2018-07-20 RX ORDER — FLUMAZENIL 0.1 MG/ML
0.2 INJECTION INTRAVENOUS AS NEEDED
Status: DISCONTINUED | OUTPATIENT
Start: 2018-07-20 | End: 2018-07-20 | Stop reason: HOSPADM

## 2018-07-20 RX ORDER — SODIUM CHLORIDE, SODIUM LACTATE, POTASSIUM CHLORIDE, CALCIUM CHLORIDE 600; 310; 30; 20 MG/100ML; MG/100ML; MG/100ML; MG/100ML
100 INJECTION, SOLUTION INTRAVENOUS CONTINUOUS
Status: DISCONTINUED | OUTPATIENT
Start: 2018-07-20 | End: 2018-07-21

## 2018-07-20 RX ORDER — ROCURONIUM BROMIDE 10 MG/ML
INJECTION, SOLUTION INTRAVENOUS AS NEEDED
Status: DISCONTINUED | OUTPATIENT
Start: 2018-07-20 | End: 2018-07-20 | Stop reason: SURG

## 2018-07-20 RX ADMIN — PHENYLEPHRINE HYDROCHLORIDE 100 MCG: 10 INJECTION INTRAVENOUS at 17:12

## 2018-07-20 RX ADMIN — MIDAZOLAM 1 MG: 1 INJECTION INTRAMUSCULAR; INTRAVENOUS at 14:19

## 2018-07-20 RX ADMIN — FAMOTIDINE 20 MG: 20 TABLET, FILM COATED ORAL at 08:04

## 2018-07-20 RX ADMIN — FAMOTIDINE 20 MG: 20 TABLET, FILM COATED ORAL at 21:55

## 2018-07-20 RX ADMIN — NIFEDIPINE 60 MG: 60 TABLET, FILM COATED, EXTENDED RELEASE ORAL at 08:04

## 2018-07-20 RX ADMIN — DOCUSATE SODIUM -SENNOSIDES 2 TABLET: 50; 8.6 TABLET, COATED ORAL at 21:55

## 2018-07-20 RX ADMIN — SODIUM CHLORIDE 100 ML/HR: 9 INJECTION, SOLUTION INTRAVENOUS at 06:49

## 2018-07-20 RX ADMIN — CARVEDILOL 6.25 MG: 6.25 TABLET, FILM COATED ORAL at 21:55

## 2018-07-20 RX ADMIN — LIDOCAINE 1 PATCH: 50 PATCH CUTANEOUS at 08:04

## 2018-07-20 RX ADMIN — FENTANYL CITRATE 50 MCG: 50 INJECTION, SOLUTION INTRAMUSCULAR; INTRAVENOUS at 14:19

## 2018-07-20 RX ADMIN — CEFAZOLIN SODIUM 2 G: 2 INJECTION, SOLUTION INTRAVENOUS at 16:09

## 2018-07-20 RX ADMIN — ROCURONIUM BROMIDE 10 MG: 10 INJECTION INTRAVENOUS at 17:05

## 2018-07-20 RX ADMIN — LIDOCAINE HYDROCHLORIDE 60 MG: 20 INJECTION, SOLUTION INFILTRATION; PERINEURAL at 16:19

## 2018-07-20 RX ADMIN — FENTANYL CITRATE 100 MCG: 50 INJECTION INTRAMUSCULAR; INTRAVENOUS at 16:19

## 2018-07-20 RX ADMIN — CARVEDILOL 6.25 MG: 6.25 TABLET, FILM COATED ORAL at 08:04

## 2018-07-20 RX ADMIN — ASPIRIN 81 MG: 81 TABLET ORAL at 08:04

## 2018-07-20 RX ADMIN — PHENYLEPHRINE HYDROCHLORIDE 100 MCG: 10 INJECTION INTRAVENOUS at 17:32

## 2018-07-20 RX ADMIN — BUPIVACAINE HYDROCHLORIDE 30 ML: 5 INJECTION, SOLUTION EPIDURAL; INTRACAUDAL; PERINEURAL at 14:19

## 2018-07-20 RX ADMIN — SODIUM CHLORIDE, POTASSIUM CHLORIDE, SODIUM LACTATE AND CALCIUM CHLORIDE: 600; 310; 30; 20 INJECTION, SOLUTION INTRAVENOUS at 17:56

## 2018-07-20 RX ADMIN — HYDROCODONE BITARTRATE AND ACETAMINOPHEN 1 TABLET: 5; 325 TABLET ORAL at 19:28

## 2018-07-20 RX ADMIN — SUGAMMADEX 400 MG: 100 INJECTION, SOLUTION INTRAVENOUS at 17:45

## 2018-07-20 RX ADMIN — DEXAMETHASONE SODIUM PHOSPHATE 8 MG: 10 INJECTION INTRAMUSCULAR; INTRAVENOUS at 16:30

## 2018-07-20 RX ADMIN — FAMOTIDINE 20 MG: 10 INJECTION, SOLUTION INTRAVENOUS at 14:19

## 2018-07-20 RX ADMIN — ONDANSETRON 4 MG: 2 INJECTION INTRAMUSCULAR; INTRAVENOUS at 17:45

## 2018-07-20 RX ADMIN — ATORVASTATIN CALCIUM 20 MG: 20 TABLET, FILM COATED ORAL at 08:04

## 2018-07-20 RX ADMIN — PROPOFOL 200 MG: 10 INJECTION, EMULSION INTRAVENOUS at 16:19

## 2018-07-20 RX ADMIN — PHENYLEPHRINE HYDROCHLORIDE 100 MCG: 10 INJECTION INTRAVENOUS at 16:59

## 2018-07-20 RX ADMIN — HYDROCODONE BITARTRATE AND ACETAMINOPHEN 1 TABLET: 5; 325 TABLET ORAL at 08:03

## 2018-07-20 RX ADMIN — PHENYLEPHRINE HYDROCHLORIDE 100 MCG: 10 INJECTION INTRAVENOUS at 17:22

## 2018-07-20 RX ADMIN — FENTANYL CITRATE 50 MCG: 50 INJECTION INTRAMUSCULAR; INTRAVENOUS at 18:01

## 2018-07-20 RX ADMIN — PHENYLEPHRINE HYDROCHLORIDE 100 MCG: 10 INJECTION INTRAVENOUS at 17:27

## 2018-07-20 RX ADMIN — ROCURONIUM BROMIDE 50 MG: 10 INJECTION INTRAVENOUS at 16:19

## 2018-07-20 RX ADMIN — HYDROCODONE BITARTRATE AND ACETAMINOPHEN 1 TABLET: 5; 325 TABLET ORAL at 23:29

## 2018-07-20 RX ADMIN — CEFAZOLIN SODIUM 1 G: 1 INJECTION, SOLUTION INTRAVENOUS at 22:41

## 2018-07-20 RX ADMIN — PHENYLEPHRINE HYDROCHLORIDE 100 MCG: 10 INJECTION INTRAVENOUS at 17:42

## 2018-07-20 RX ADMIN — SODIUM CHLORIDE, POTASSIUM CHLORIDE, SODIUM LACTATE AND CALCIUM CHLORIDE 9 ML/HR: 600; 310; 30; 20 INJECTION, SOLUTION INTRAVENOUS at 14:19

## 2018-07-20 RX ADMIN — SODIUM CHLORIDE, POTASSIUM CHLORIDE, SODIUM LACTATE AND CALCIUM CHLORIDE 100 ML/HR: 600; 310; 30; 20 INJECTION, SOLUTION INTRAVENOUS at 20:06

## 2018-07-20 NOTE — ANESTHESIA PREPROCEDURE EVALUATION
Anesthesia Evaluation     NPO Solid Status: > 8 hours             Airway   Mallampati: II  TM distance: >3 FB  Neck ROM: full  Dental - normal exam     Pulmonary - normal exam   (+) sleep apnea,   Cardiovascular - normal exam    (+) hypertension, dysrhythmias (SVT- resolved several years ago), PVD, hyperlipidemia,   (-) past MI      Neuro/Psych  GI/Hepatic/Renal/Endo    (+)  GERD,  renal disease (previous NSAID-related renal failure, resolved),     Musculoskeletal     Abdominal    Substance History      OB/GYN          Other      history of cancer                    Anesthesia Plan    ASA 3     general     Anesthetic plan and risks discussed with patient.

## 2018-07-20 NOTE — ANESTHESIA POSTPROCEDURE EVALUATION
"Patient: Sachin Almanzar    Procedure Summary     Date:  07/20/18 Room / Location:  [facility] OR [address] [place] [address] MAIN OR    Anesthesia Start:  1608 Anesthesia Stop:  1811    Procedure:  CLAVICLE OPEN REDUCTION INTERNAL FIXATION (Right Shoulder) Diagnosis:       Closed displaced fracture of acromial end of right clavicle, initial encounter      (Closed displaced fracture of acromial end of right clavicle, initial encounter [S42.031A])    Surgeon:  Cuate Elias MD Provider:  Angel Christopher MD    Anesthesia Type:  general ASA Status:  3          Anesthesia Type: general  Last vitals  BP   147/87 (07/20/18 1915)   Temp   36.8 °C (98.2 °F) (07/20/18 1805)   Pulse   85 (07/20/18 1915)   Resp   16 (07/20/18 1915)     SpO2   96 % (07/20/18 1915)     Post Anesthesia Care and Evaluation    Patient location during evaluation: PACU  Patient participation: complete - patient participated  Level of consciousness: awake and alert  Pain management: adequate  Airway patency: patent  Anesthetic complications: No anesthetic complications    Cardiovascular status: acceptable  Respiratory status: acceptable  Hydration status: acceptable    Comments: /87   Pulse 85   Temp 36.8 °C (98.2 °F) (Oral)   Resp 16   Ht 179.1 cm (70.5\")   Wt 107 kg (235 lb)   SpO2 96%   BMI 33.24 kg/m²       "

## 2018-07-20 NOTE — ANESTHESIA PROCEDURE NOTES
Peripheral Block    Patient location during procedure: holding area  Start time: 7/20/2018 2:15 PM  Stop time: 7/20/2018 2:19 PM  Reason for block: at surgeon's request and post-op pain management  Performed by  Anesthesiologist: KYM CARLSON  Preanesthetic Checklist  Completed: patient identified, surgical consent, pre-op evaluation, timeout performed and risks and benefits discussed  Prep:  Pt Position: sitting  Sterile barriers:cap and gloves  Prep: ChloraPrep  Patient monitoring: blood pressure monitoring, continuous pulse oximetry and EKG  Procedure  Sedation:yes    Guidance:ultrasound guided  ULTRASOUND INTERPRETATION.  Using ultrasound guidance a 22 G gauge needle was placed in close proximity to the brachial plexus nerve, at which point, under ultrasound guidance anesthetic was injected in the area of the nerve and spread of the anesthesia was seen on ultrasound in close proximity thereto.  There were no abnormalities seen on ultrasound; a digital image was taken; and the patient tolerated the procedure with no complications. Images:still images obtained    Laterality:right  Block Type:interscalene  Injection Technique:single-shot  Needle Type:echogenic  Needle Gauge:22 G    Medications  Local Injected:bupivacaine 0.5% Local Amount Injected:30mL  Post Assessment  Injection Assessment: negative aspiration for heme, no paresthesia on injection and incremental injection  Patient Tolerance:comfortable throughout block  Complications:no  Additional Notes  ULTRASOUND INTERPRETATION. Using ultrasound guidance theneedle was placed in close proximity to the nerve, at which point, under ultrasound guidance, local anesthetic was injected around but not in the nerve and spread of the anesthesia was seen on ultrasound in close proximity thereto.  There were no abnormalities seen on ultrasound; a digital image was taken; and the patient tolerated the procedure with no complications.

## 2018-07-21 LAB
ANION GAP SERPL CALCULATED.3IONS-SCNC: 11.9 MMOL/L
BUN BLD-MCNC: 21 MG/DL (ref 8–23)
BUN/CREAT SERPL: 19.3 (ref 7–25)
CALCIUM SPEC-SCNC: 8.4 MG/DL (ref 8.6–10.5)
CHLORIDE SERPL-SCNC: 102 MMOL/L (ref 98–107)
CO2 SERPL-SCNC: 22.1 MMOL/L (ref 22–29)
CREAT BLD-MCNC: 1.09 MG/DL (ref 0.76–1.27)
DEPRECATED RDW RBC AUTO: 46.5 FL (ref 37–54)
ERYTHROCYTE [DISTWIDTH] IN BLOOD BY AUTOMATED COUNT: 13.5 % (ref 11.5–14.5)
GFR SERPL CREATININE-BSD FRML MDRD: 67 ML/MIN/1.73
GLUCOSE BLD-MCNC: 114 MG/DL (ref 65–99)
GLUCOSE BLDC GLUCOMTR-MCNC: 125 MG/DL (ref 70–130)
GLUCOSE BLDC GLUCOMTR-MCNC: 125 MG/DL (ref 70–130)
HCT VFR BLD AUTO: 34.2 % (ref 40.4–52.2)
HGB BLD-MCNC: 11.4 G/DL (ref 13.7–17.6)
MCH RBC QN AUTO: 31.4 PG (ref 27–32.7)
MCHC RBC AUTO-ENTMCNC: 33.3 G/DL (ref 32.6–36.4)
MCV RBC AUTO: 94.2 FL (ref 79.8–96.2)
PLATELET # BLD AUTO: 162 10*3/MM3 (ref 140–500)
PMV BLD AUTO: 9.2 FL (ref 6–12)
POTASSIUM BLD-SCNC: 3.8 MMOL/L (ref 3.5–5.2)
RBC # BLD AUTO: 3.63 10*6/MM3 (ref 4.6–6)
SODIUM BLD-SCNC: 136 MMOL/L (ref 136–145)
WBC NRBC COR # BLD: 7.54 10*3/MM3 (ref 4.5–10.7)

## 2018-07-21 PROCEDURE — 80048 BASIC METABOLIC PNL TOTAL CA: CPT | Performed by: HOSPITALIST

## 2018-07-21 PROCEDURE — 97110 THERAPEUTIC EXERCISES: CPT

## 2018-07-21 PROCEDURE — 82962 GLUCOSE BLOOD TEST: CPT

## 2018-07-21 PROCEDURE — 25010000003 CEFAZOLIN 1-4 GM/50ML-% SOLUTION: Performed by: ORTHOPAEDIC SURGERY

## 2018-07-21 PROCEDURE — 85027 COMPLETE CBC AUTOMATED: CPT | Performed by: HOSPITALIST

## 2018-07-21 PROCEDURE — 97166 OT EVAL MOD COMPLEX 45 MIN: CPT

## 2018-07-21 RX ORDER — HYDROCODONE BITARTRATE AND ACETAMINOPHEN 10; 325 MG/1; MG/1
1 TABLET ORAL EVERY 4 HOURS PRN
Status: DISCONTINUED | OUTPATIENT
Start: 2018-07-21 | End: 2018-07-22 | Stop reason: HOSPADM

## 2018-07-21 RX ADMIN — Medication 10 ML: at 21:31

## 2018-07-21 RX ADMIN — CEFAZOLIN SODIUM 1 G: 1 INJECTION, SOLUTION INTRAVENOUS at 05:57

## 2018-07-21 RX ADMIN — HYDROCODONE BITARTRATE AND ACETAMINOPHEN 1 TABLET: 10; 325 TABLET ORAL at 17:16

## 2018-07-21 RX ADMIN — CARVEDILOL 6.25 MG: 6.25 TABLET, FILM COATED ORAL at 09:19

## 2018-07-21 RX ADMIN — ATORVASTATIN CALCIUM 20 MG: 20 TABLET, FILM COATED ORAL at 09:19

## 2018-07-21 RX ADMIN — NIFEDIPINE 60 MG: 60 TABLET, FILM COATED, EXTENDED RELEASE ORAL at 09:19

## 2018-07-21 RX ADMIN — DOCUSATE SODIUM -SENNOSIDES 2 TABLET: 50; 8.6 TABLET, COATED ORAL at 21:31

## 2018-07-21 RX ADMIN — HYDROCODONE BITARTRATE AND ACETAMINOPHEN 1 TABLET: 10; 325 TABLET ORAL at 12:04

## 2018-07-21 RX ADMIN — LIDOCAINE 1 PATCH: 50 PATCH CUTANEOUS at 09:19

## 2018-07-21 RX ADMIN — CEFAZOLIN SODIUM 1 G: 1 INJECTION, SOLUTION INTRAVENOUS at 22:18

## 2018-07-21 RX ADMIN — HYDROCODONE BITARTRATE AND ACETAMINOPHEN 1 TABLET: 5; 325 TABLET ORAL at 03:30

## 2018-07-21 RX ADMIN — HYDROCODONE BITARTRATE AND ACETAMINOPHEN 1 TABLET: 5; 325 TABLET ORAL at 07:31

## 2018-07-21 RX ADMIN — CEFAZOLIN SODIUM 1 G: 1 INJECTION, SOLUTION INTRAVENOUS at 13:33

## 2018-07-21 RX ADMIN — CARVEDILOL 6.25 MG: 6.25 TABLET, FILM COATED ORAL at 21:31

## 2018-07-21 RX ADMIN — SODIUM CHLORIDE, POTASSIUM CHLORIDE, SODIUM LACTATE AND CALCIUM CHLORIDE 100 ML/HR: 600; 310; 30; 20 INJECTION, SOLUTION INTRAVENOUS at 05:57

## 2018-07-21 RX ADMIN — SODIUM CHLORIDE, POTASSIUM CHLORIDE, SODIUM LACTATE AND CALCIUM CHLORIDE 100 ML/HR: 600; 310; 30; 20 INJECTION, SOLUTION INTRAVENOUS at 17:19

## 2018-07-21 RX ADMIN — FAMOTIDINE 20 MG: 20 TABLET, FILM COATED ORAL at 21:31

## 2018-07-21 RX ADMIN — POLYETHYLENE GLYCOL 3350 17 G: 17 POWDER, FOR SOLUTION ORAL at 09:19

## 2018-07-21 RX ADMIN — ACETAMINOPHEN 650 MG: 325 TABLET, FILM COATED ORAL at 21:31

## 2018-07-21 RX ADMIN — FAMOTIDINE 20 MG: 20 TABLET, FILM COATED ORAL at 09:19

## 2018-07-21 RX ADMIN — ASPIRIN 81 MG: 81 TABLET ORAL at 09:19

## 2018-07-22 VITALS
HEART RATE: 94 BPM | TEMPERATURE: 98.7 F | DIASTOLIC BLOOD PRESSURE: 73 MMHG | HEIGHT: 71 IN | RESPIRATION RATE: 16 BRPM | SYSTOLIC BLOOD PRESSURE: 137 MMHG | OXYGEN SATURATION: 95 % | BODY MASS INDEX: 32.9 KG/M2 | WEIGHT: 235 LBS

## 2018-07-22 LAB
GLUCOSE BLDC GLUCOMTR-MCNC: 114 MG/DL (ref 70–130)
GLUCOSE BLDC GLUCOMTR-MCNC: 124 MG/DL (ref 70–130)

## 2018-07-22 PROCEDURE — 82962 GLUCOSE BLOOD TEST: CPT

## 2018-07-22 PROCEDURE — 25010000003 CEFAZOLIN 1-4 GM/50ML-% SOLUTION: Performed by: ORTHOPAEDIC SURGERY

## 2018-07-22 RX ORDER — HYDROCODONE BITARTRATE AND ACETAMINOPHEN 5; 325 MG/1; MG/1
1 TABLET ORAL EVERY 6 HOURS PRN
Qty: 12 TABLET | Refills: 0 | Status: SHIPPED | OUTPATIENT
Start: 2018-07-22 | End: 2018-08-23

## 2018-07-22 RX ORDER — NIFEDIPINE 60 MG/1
60 TABLET, FILM COATED, EXTENDED RELEASE ORAL
Qty: 30 TABLET | Refills: 0 | Status: SHIPPED | OUTPATIENT
Start: 2018-07-23 | End: 2018-08-08 | Stop reason: SDUPTHER

## 2018-07-22 RX ORDER — CARVEDILOL 6.25 MG/1
6.25 TABLET ORAL EVERY 12 HOURS SCHEDULED
Qty: 60 TABLET | Refills: 0 | Status: SHIPPED | OUTPATIENT
Start: 2018-07-22 | End: 2018-08-08 | Stop reason: SDUPTHER

## 2018-07-22 RX ORDER — LIDOCAINE 50 MG/G
1 PATCH TOPICAL
Qty: 30 EACH | Refills: 0 | Status: SHIPPED | OUTPATIENT
Start: 2018-07-23 | End: 2019-01-10

## 2018-07-22 RX ADMIN — ATORVASTATIN CALCIUM 20 MG: 20 TABLET, FILM COATED ORAL at 08:49

## 2018-07-22 RX ADMIN — LIDOCAINE 1 PATCH: 50 PATCH CUTANEOUS at 08:49

## 2018-07-22 RX ADMIN — FAMOTIDINE 20 MG: 20 TABLET, FILM COATED ORAL at 08:49

## 2018-07-22 RX ADMIN — ACETAMINOPHEN 650 MG: 325 TABLET, FILM COATED ORAL at 13:16

## 2018-07-22 RX ADMIN — ACETAMINOPHEN 650 MG: 325 TABLET, FILM COATED ORAL at 04:24

## 2018-07-22 RX ADMIN — POLYETHYLENE GLYCOL 3350 17 G: 17 POWDER, FOR SOLUTION ORAL at 08:49

## 2018-07-22 RX ADMIN — CARVEDILOL 6.25 MG: 6.25 TABLET, FILM COATED ORAL at 08:49

## 2018-07-22 RX ADMIN — CEFAZOLIN SODIUM 1 G: 1 INJECTION, SOLUTION INTRAVENOUS at 06:23

## 2018-07-22 RX ADMIN — NIFEDIPINE 60 MG: 60 TABLET, FILM COATED, EXTENDED RELEASE ORAL at 08:49

## 2018-07-22 RX ADMIN — ASPIRIN 81 MG: 81 TABLET ORAL at 08:49

## 2018-07-23 ENCOUNTER — TELEPHONE (OUTPATIENT)
Dept: INTERNAL MEDICINE | Facility: CLINIC | Age: 68
End: 2018-07-23

## 2018-07-23 ENCOUNTER — EPISODE CHANGES (OUTPATIENT)
Dept: CASE MANAGEMENT | Facility: OTHER | Age: 68
End: 2018-07-23

## 2018-07-23 NOTE — TELEPHONE ENCOUNTER
Discharged from [facility] this weekend, called to get a verbal for PT/OT.     Verbal okay given.

## 2018-07-23 NOTE — TELEPHONE ENCOUNTER
[facility]  Called and asked if he could send a nurse out to the pt since he has a couple lacerations that need to be evaluated.   Verbal okay given.

## 2018-07-23 NOTE — TELEPHONE ENCOUNTER
Pt's wife is trying to schedule an appointment for  to see  this week for a hospital follow up for a laceration and surgery from his collar bone.

## 2018-07-24 ENCOUNTER — OFFICE VISIT (OUTPATIENT)
Dept: INTERNAL MEDICINE | Facility: CLINIC | Age: 68
End: 2018-07-24

## 2018-07-24 VITALS
RESPIRATION RATE: 18 BRPM | BODY MASS INDEX: 32.9 KG/M2 | HEIGHT: 71 IN | DIASTOLIC BLOOD PRESSURE: 72 MMHG | OXYGEN SATURATION: 99 % | HEART RATE: 91 BPM | SYSTOLIC BLOOD PRESSURE: 130 MMHG | WEIGHT: 235 LBS

## 2018-07-24 DIAGNOSIS — S01.01XA LACERATION OF SCALP, INITIAL ENCOUNTER: Primary | ICD-10-CM

## 2018-07-24 DIAGNOSIS — Z48.02 ENCOUNTER FOR STAPLE REMOVAL: ICD-10-CM

## 2018-07-24 DIAGNOSIS — R55 SYNCOPE, UNSPECIFIED SYNCOPE TYPE: ICD-10-CM

## 2018-07-24 PROCEDURE — 99213 OFFICE O/P EST LOW 20 MIN: CPT | Performed by: INTERNAL MEDICINE

## 2018-07-24 RX ORDER — MOMETASONE FUROATE 1 MG/G
CREAM TOPICAL
COMMUNITY
Start: 2018-06-05 | End: 2018-07-27

## 2018-07-24 NOTE — PROGRESS NOTES
Chief Complaint  Sachin Almanzar is a 67 y.o. male who presents for Suture / Staple Removal (10 staples on the back right side of the pt's head. ); Medical supplies (Pt would like a script for a shower chair. ); and Fall (Pt blacked out while walking up the stairs, made it up 4-5 stairs before he blacked out and fell backwards. )  .    History of Present Illness   Sachin is here for acute care for staple removal on his right scalp laceration which occurred when he fell down several stairs at home after passing out.  He had several cardiac tests done in the hospital and has a follow up with Dr. Zaidi coming up.  He has been more fatigued in the evenings.  His head was initially quite tender but this has gotten better.  Prior to passing out, he had been sitting on the couch.  He stood up, talked to his wife a bit while standing and then walked across the room to go upstairs.  He doesn't remember feeling bad or near syncopal during any of this.        Review of Systems   Constitution: Positive for malaise/fatigue. Negative for chills and fever.   Skin: Negative for poor wound healing and suspicious lesions.       Patient Active Problem List   Diagnosis   • Atopic rhinitis   • Impotence of organic origin   • Gastroesophageal reflux disease   • Transient acantholytic dermatosis   • History of colonic polyps   • Hyperlipidemia   • Hypertension   • Calculus of kidney   • Adult body mass index greater than 30   • Obstructive sleep apnea syndrome   • Supraventricular tachycardia (CMS/HCC)   • Venous insufficiency of lower extremity   • Allergy to shellfish   • History of colon polyps   • Elevated serum glucose   • Multiple renal cysts   • Syncope   • Scalp laceration   • ARF (acute renal failure) (CMS/HCC)   • Closed displaced fracture of lateral end of right clavicle       Past Medical History:   Diagnosis Date   • Basal cell carcinoma of skin 7/10/2012    Overview:  NOSE   • Gastric ulcer    • GERD (gastroesophageal  reflux disease)    • Hyperlipidemia    • Hypertension    • Kidney stones    • Malignant neoplasm of urinary bladder (CMS/HCC) 7/10/2012    Overview:  3/2011, Dr. Muniz, TURBT, BCG and infusion of chemo   • Melanoma in situ of face (CMS/HCC) 2016    Overview:  Removed per Dr. Steen (derm) on 2016   • Sleep apnea with use of continuous positive airway pressure (CPAP)    • SVT (supraventricular tachycardia) (CMS/HCC)        Past Surgical History:   Procedure Laterality Date   • APPENDECTOMY     • BLADDER TUMOR EXCISION     • CLAVICLE OPEN REDUCTION INTERNAL FIXATION Right 2018    Procedure: CLAVICLE OPEN REDUCTION INTERNAL FIXATION;  Surgeon: Cuate Elias MD;  Location: [facility] MAIN OR;  Service: Orthopedics   • COLONOSCOPY N/A 2017    Procedure: COLONOSCOPY INTO CECUM WITH COLD BX POLYPECTOMY ;  Surgeon: Domingo Cantrell MD;  Location: [facility] ENDOSCOPY;  Service:    • COSMETIC SURGERY      Nose - melanoma removal   • MOHS SURGERY      2 bcc   • TONSILLECTOMY         Family History   Problem Relation Age of Onset   • Heart attack Mother          age 55   • Cancer Father          at age 44   • Diabetes Maternal Grandmother    • Hypertension Paternal Uncle        Social History     Social History   • Marital status:      Spouse name: Julita   • Number of children: 1   • Years of education: N/A     Occupational History   • retired president of [employer]      Social History Main Topics   • Smoking status: Never Smoker   • Smokeless tobacco: Never Used   • Alcohol use Yes      Comment: 2-3 beers 5 days a week   • Drug use: No   • Sexual activity: Defer     Other Topics Concern   • Not on file     Social History Narrative   • No narrative on file       Current Outpatient Prescriptions on File Prior to Visit   Medication Sig Dispense Refill   • aspirin 81 MG EC tablet Take 81 mg by mouth Daily.     • atorvastatin (LIPITOR) 20 MG tablet TAKE 1 TABLET EVERY NIGHT 90 tablet 3   • carvedilol  "(COREG) 6.25 MG tablet Take 1 tablet by mouth Every 12 (Twelve) Hours. 60 tablet 0   • fexofenadine (ALLEGRA) 180 MG tablet Take 180 mg by mouth Daily.     • HYDROcodone-acetaminophen (NORCO) 5-325 MG per tablet Take 1 tablet by mouth Every 6 (Six) Hours As Needed for Moderate Pain . 12 tablet 0   • lidocaine (LIDODERM) 5 % Place 1 patch on the skin Daily. Remove & Discard patch within 12 hours or as directed by MD 30 each 0   • melatonin 5 MG tablet tablet Take 5 mg by mouth At Night As Needed (insommnia).     • Multiple Vitamin (MULTIVITAMIN) capsule Take 1 capsule by mouth.     • NIFEdipine XL (ADALAT CC) 60 MG 24 hr tablet Take 1 tablet by mouth Daily. 30 tablet 0   • polyethylene glycol (MIRALAX) pack packet Take 17 g by mouth Daily. 15 each 0   • raNITIdine (ZANTAC) 300 MG tablet Take 300 mg by mouth Every Night.     • sildenafil (VIAGRA) 100 MG tablet Take 100 mg by mouth As Needed for erectile dysfunction.       No current facility-administered medications on file prior to visit.        Allergies   Allergen Reactions   • Ace Inhibitors    • Shellfish-Derived Products Hives     Throat swelling, itching       Vitals:    07/24/18 1417   BP: 130/72   Pulse: 91   Resp: 18   SpO2: 99%   Weight: 107 kg (235 lb)   Height: 179.1 cm (70.51\")       Body mass index is 33.23 kg/m².    Objective     Physical Exam   Skin: Laceration noted.       Suture Removal  Date/Time: 7/24/2018 2:58 PM  Performed by: RODOLFO VEE  Authorized by: RODOLFO VEE   Consent: Verbal consent obtained.  Consent given by: patient  Patient understanding: patient states understanding of the procedure being performed  Patient identity confirmed: verbally with patient  Body area: head/neck  Location details: scalp  Wound Appearance: clean  Staples Removed: 10  Patient tolerance: Patient tolerated the procedure well with no immediate complications          Assessment/Plan   Diagnoses and all orders for this visit:    Laceration of scalp, " initial encounter    Encounter for staple removal    Syncope, unspecified syncope type    Other orders  -     mometasone (ELOCON) 0.1 % cream;   -     Suture Removal        Discussion/Summary  Sachin is here for staple removal.  Advised that he can wash his hair but not scrub the laceration area.  Just let soapy water wash over it.  Apply abx ointment to the area once a day for the next week.  Script for shower chair given to pt.  Keep hospital follow up appt in a month.    No Follow-up on file.    Current outpatient and discharge medications have been reconciled for the patient.  Reviewed by: Hali Potts MD

## 2018-07-26 ENCOUNTER — TELEPHONE (OUTPATIENT)
Dept: INTERNAL MEDICINE | Facility: CLINIC | Age: 68
End: 2018-07-26

## 2018-07-26 NOTE — TELEPHONE ENCOUNTER
PT called and stated that she completed the PT eval and the pt is doing fantastic and does not need PT.

## 2018-07-27 ENCOUNTER — OFFICE VISIT (OUTPATIENT)
Dept: CARDIOLOGY | Facility: CLINIC | Age: 68
End: 2018-07-27

## 2018-07-27 VITALS
OXYGEN SATURATION: 99 % | SYSTOLIC BLOOD PRESSURE: 122 MMHG | BODY MASS INDEX: 33.36 KG/M2 | HEIGHT: 70 IN | DIASTOLIC BLOOD PRESSURE: 60 MMHG | HEART RATE: 79 BPM | WEIGHT: 233 LBS

## 2018-07-27 DIAGNOSIS — R55 SYNCOPE, UNSPECIFIED SYNCOPE TYPE: Primary | ICD-10-CM

## 2018-07-27 DIAGNOSIS — R53.83 FATIGUE, UNSPECIFIED TYPE: ICD-10-CM

## 2018-07-27 DIAGNOSIS — R94.31 ABNORMAL ECG: ICD-10-CM

## 2018-07-27 DIAGNOSIS — Z82.49 FAMILY HISTORY OF EARLY CAD: ICD-10-CM

## 2018-07-27 PROCEDURE — 99213 OFFICE O/P EST LOW 20 MIN: CPT | Performed by: PHYSICIAN ASSISTANT

## 2018-07-27 PROCEDURE — 93000 ELECTROCARDIOGRAM COMPLETE: CPT | Performed by: PHYSICIAN ASSISTANT

## 2018-07-27 NOTE — PROGRESS NOTES
Date of Office Visit: 2018  Encounter Provider: PEPE Thompson  Place of Service: [facility] CARDIOLOGY  Patient Name: Sachin Almanzar  :1950    Chief Complaint   Patient presents with   • Hypertension     1 week hospital follow up   :     HPI: Sachin Almanzar is a 67 y.o. male, new to me, who presents today for follow-up.  Old records have been obtained and reviewed by me.  He is a patient with a past cardiac history significant for SVT up proximally 10 years ago.  He presented to the emergency room on 2018 after a syncopal episode.  This was johnnie syncope without any prodromal symptoms.  A troponin was negative.  Her d-dimer was slightly elevated and a CT angiogram of the chest was negative for pulmonary embolus.  He was seen by Dr. Zaidi for possible cardiac workup for his syncopal episode.  An echocardiogram was normal with normal LV function and no valvular abnormalities.  He had no evidence of ventricular arrhythmia or high-grade AV block.  Of note, his blood alcohol level upon ER presentation was 0.12.  He did admit to having a few beers.   Since he's been home he's been doing well.  He denies any chest pain, shortness of breath, palpitations, edema, dizziness, or syncope.  He does have some fatigue.  Interestingly enough, he states that for a few weeks prior to his syncopal episode he had a lot of fatigue.  He went to see his primary care physician who found that his kidney function was a little decreased so he stopped taking his NSAIDs.  His kidney function did improve.  Then he had a syncopal episode.  He does have a strong family history of coronary disease with a mother who had a heart attack at age 55.      Past Medical History:   Diagnosis Date   • Basal cell carcinoma of skin 7/10/2012    Overview:  NOSE   • Gastric ulcer    • GERD (gastroesophageal reflux disease)    • Hyperlipidemia    • Hypertension    • Kidney stones    • Malignant neoplasm  of urinary bladder (CMS/HCC) 7/10/2012    Overview:  3/2011, Dr. Muniz, TURBT, BCG and infusion of chemo   • Melanoma in situ of face (CMS/HCC) 2016    Overview:  Removed per Dr. Steen (derm) on 2016   • Sleep apnea with use of continuous positive airway pressure (CPAP)    • SVT (supraventricular tachycardia) (CMS/HCC)        Past Surgical History:   Procedure Laterality Date   • APPENDECTOMY     • BLADDER TUMOR EXCISION     • CLAVICLE OPEN REDUCTION INTERNAL FIXATION Right 2018    Procedure: CLAVICLE OPEN REDUCTION INTERNAL FIXATION;  Surgeon: Cuate Elias MD;  Location: [facility] MAIN OR;  Service: Orthopedics   • COLONOSCOPY N/A 2017    Procedure: COLONOSCOPY INTO CECUM WITH COLD BX POLYPECTOMY ;  Surgeon: Domingo Cantrell MD;  Location: [facility] ENDOSCOPY;  Service:    • COSMETIC SURGERY      Nose - melanoma removal   • MOHS SURGERY      2 bcc   • TONSILLECTOMY         Social History     Social History   • Marital status:      Spouse name: Julita   • Number of children: 1   • Years of education: N/A     Occupational History   • retired president of [employer]      Social History Main Topics   • Smoking status: Never Smoker   • Smokeless tobacco: Never Used   • Alcohol use 1.2 - 1.8 oz/week     2 - 3 Cans of beer per week      Comment: 2-3 beers 5 days a week   • Drug use: No   • Sexual activity: Defer     Other Topics Concern   • Not on file     Social History Narrative   • No narrative on file       Family History   Problem Relation Age of Onset   • Heart attack Mother          age 55   • Cancer Father          at age 44   • Diabetes Maternal Grandmother    • Hypertension Paternal Uncle        Review of Systems   Constitution: Positive for malaise/fatigue. Negative for chills and fever.   Cardiovascular: Negative for chest pain, dyspnea on exertion, leg swelling, near-syncope, orthopnea, palpitations, paroxysmal nocturnal dyspnea and syncope.   Respiratory: Negative for cough  and shortness of breath.    Musculoskeletal: Negative for joint pain, joint swelling and myalgias.   Gastrointestinal: Negative for abdominal pain, diarrhea, melena, nausea and vomiting.   Genitourinary: Negative for frequency and hematuria.   Neurological: Negative for light-headedness, numbness, paresthesias and seizures.   Allergic/Immunologic: Negative.    All other systems reviewed and are negative.      Allergies   Allergen Reactions   • Ace Inhibitors    • Shellfish-Derived Products Hives     Throat swelling, itching         Current Outpatient Prescriptions:   •  aspirin 81 MG EC tablet, Take 81 mg by mouth Daily., Disp: , Rfl:   •  atorvastatin (LIPITOR) 20 MG tablet, TAKE 1 TABLET EVERY NIGHT, Disp: 90 tablet, Rfl: 3  •  carvedilol (COREG) 6.25 MG tablet, Take 1 tablet by mouth Every 12 (Twelve) Hours., Disp: 60 tablet, Rfl: 0  •  fexofenadine (ALLEGRA) 180 MG tablet, Take 180 mg by mouth Daily., Disp: , Rfl:   •  HYDROcodone-acetaminophen (NORCO) 5-325 MG per tablet, Take 1 tablet by mouth Every 6 (Six) Hours As Needed for Moderate Pain ., Disp: 12 tablet, Rfl: 0  •  lidocaine (LIDODERM) 5 %, Place 1 patch on the skin Daily. Remove & Discard patch within 12 hours or as directed by MD, Disp: 30 each, Rfl: 0  •  melatonin 5 MG tablet tablet, Take 5 mg by mouth At Night As Needed (insommnia)., Disp: , Rfl:   •  Multiple Vitamin (MULTIVITAMIN) capsule, Take 1 capsule by mouth Daily., Disp: , Rfl:   •  NIFEdipine XL (ADALAT CC) 60 MG 24 hr tablet, Take 1 tablet by mouth Daily., Disp: 30 tablet, Rfl: 0  •  polyethylene glycol (MIRALAX) pack packet, Take 17 g by mouth Daily., Disp: 15 each, Rfl: 0  •  raNITIdine (ZANTAC) 300 MG tablet, Take 300 mg by mouth Every Night., Disp: , Rfl:   •  sildenafil (VIAGRA) 100 MG tablet, Take 100 mg by mouth As Needed for erectile dysfunction., Disp: , Rfl:       Objective:     Vitals:    07/27/18 1330   BP: 122/60   BP Location: Left arm   Pulse: 79   SpO2: 99%   Weight: 106 kg  "(233 lb)   Height: 177.8 cm (70\")     Body mass index is 33.43 kg/m².    PHYSICAL EXAM:    Physical Exam   Constitutional: He is oriented to person, place, and time. He appears well-developed and well-nourished. No distress.   HENT:   Head: Normocephalic and atraumatic.   Eyes: Pupils are equal, round, and reactive to light.   Neck: No JVD present. No thyromegaly present.   Cardiovascular: Normal rate, regular rhythm, normal heart sounds and intact distal pulses.    No murmur heard.  Pulmonary/Chest: Effort normal and breath sounds normal. No respiratory distress.   Abdominal: Soft. Bowel sounds are normal. He exhibits no distension. There is no splenomegaly or hepatomegaly. There is no tenderness.   Musculoskeletal: Normal range of motion. He exhibits no edema.   Neurological: He is alert and oriented to person, place, and time.   Skin: Skin is warm and dry. He is not diaphoretic. No erythema.   Right clavicle incision well healed.   Psychiatric: He has a normal mood and affect. His behavior is normal. Judgment normal.         ECG 12 Lead  Date/Time: 7/27/2018 1:57 PM  Performed by: JUAN JOSÉ MOCTEZUMA  Authorized by: JUAN JOSÉ MOCTEZUMA   Comparison: compared with previous ECG from 7/17/2018  Similar to previous ECG  Rhythm: sinus rhythm  BPM: 79  T depression: III  Q waves: III  Clinical impression: abnormal ECG  Comments: Indication: Syncope.              Assessment:       Diagnosis Plan   1. Syncope, unspecified syncope type  ECG 12 Lead    Treadmill Stress Test    Holter Monitor - 72 Hour Up To 21 Days   2. Fatigue, unspecified type     3. Family history of early CAD  Treadmill Stress Test   4. Abnormal ECG  Treadmill Stress Test     Orders Placed This Encounter   Procedures   • Treadmill Stress Test     Standing Status:   Future     Standing Expiration Date:   7/27/2019     Order Specific Question:   Reason for exam?     Answer:   Other     Order Specific Question:   Reason for Exam:     Answer:   johnnie syncope   • " Holter Monitor - 72 Hour Up To 21 Days     Cardio key     Standing Status:   Future     Standing Expiration Date:   7/27/2019     Order Specific Question:   Reason for exam?     Answer:   Presyncope or Syncope   • ECG 12 Lead     This order was created via procedure documentation          Plan:       Overall I think he is stable and doing well.  He has not had any more syncopal episodes.  He has not had any shortness of breath or chest pain.  I did discuss the plan with Dr. Zaidi.  He will follow-up with Dr. Zaidi in about a month, and we will check a treadmill stress test and place a 2 week heart monitor on him the same day.    As always, it has been a pleasure to participate in your patient's care.      Sincerely,         Olamide Awad PA-C

## 2018-08-02 RX ORDER — VERAPAMIL HYDROCHLORIDE 100 MG/1
CAPSULE, EXTENDED RELEASE ORAL
Qty: 360 CAPSULE | Refills: 3 | OUTPATIENT
Start: 2018-08-02

## 2018-08-02 RX ORDER — EPINEPHRINE 0.3 MG/.3ML
0.3 INJECTION SUBCUTANEOUS ONCE
Qty: 1 EACH | Refills: 3 | Status: SHIPPED | OUTPATIENT
Start: 2018-08-02 | End: 2018-08-02

## 2018-08-02 NOTE — TELEPHONE ENCOUNTER
I don't see this in the pt's med list, okay to fill?    Pt's wife stated that he is no longer taking that for BP.    Requested an epi pen refill to be sent to his [facility].

## 2018-08-07 ENCOUNTER — HOSPITAL ENCOUNTER (OUTPATIENT)
Dept: CARDIOLOGY | Facility: HOSPITAL | Age: 68
Discharge: HOME | End: 2018-08-07
Admitting: PHYSICIAN ASSISTANT

## 2018-08-07 ENCOUNTER — TELEPHONE (OUTPATIENT)
Dept: CARDIOLOGY | Facility: CLINIC | Age: 68
End: 2018-08-07

## 2018-08-07 DIAGNOSIS — Z82.49 FAMILY HISTORY OF EARLY CAD: ICD-10-CM

## 2018-08-07 DIAGNOSIS — R55 SYNCOPE, UNSPECIFIED SYNCOPE TYPE: ICD-10-CM

## 2018-08-07 DIAGNOSIS — R94.31 ABNORMAL ECG: ICD-10-CM

## 2018-08-07 LAB
BH CV STRESS BP STAGE 1: NORMAL
BH CV STRESS BP STAGE 2: NORMAL
BH CV STRESS DURATION MIN STAGE 1: 3
BH CV STRESS DURATION MIN STAGE 2: 3
BH CV STRESS DURATION SEC STAGE 1: 0
BH CV STRESS DURATION SEC STAGE 2: 0
BH CV STRESS GRADE STAGE 1: 10
BH CV STRESS GRADE STAGE 2: 12
BH CV STRESS HR STAGE 1: 115
BH CV STRESS HR STAGE 2: 150
BH CV STRESS METS STAGE 1: 5
BH CV STRESS METS STAGE 2: 7.5
BH CV STRESS PROTOCOL 1: NORMAL
BH CV STRESS RECOVERY BP: NORMAL MMHG
BH CV STRESS RECOVERY HR: 78 BPM
BH CV STRESS SPEED STAGE 1: 1.7
BH CV STRESS SPEED STAGE 2: 2.5
BH CV STRESS STAGE 1: 1
BH CV STRESS STAGE 2: 2
MAXIMAL PREDICTED HEART RATE: 150 BPM
PERCENT MAX PREDICTED HR: 100 %
STRESS BASELINE BP: NORMAL MMHG
STRESS BASELINE HR: 94 BPM
STRESS PERCENT HR: 118 %
STRESS POST ESTIMATED WORKLOAD: 7 METS
STRESS POST EXERCISE DUR MIN: 6 MIN
STRESS POST EXERCISE DUR SEC: 0 SEC
STRESS POST PEAK BP: NORMAL MMHG
STRESS POST PEAK HR: 150 BPM
STRESS TARGET HR: 128 BPM

## 2018-08-07 PROCEDURE — 93018 CV STRESS TEST I&R ONLY: CPT | Performed by: INTERNAL MEDICINE

## 2018-08-07 PROCEDURE — 93016 CV STRESS TEST SUPVJ ONLY: CPT | Performed by: INTERNAL MEDICINE

## 2018-08-07 PROCEDURE — 93017 CV STRESS TEST TRACING ONLY: CPT

## 2018-08-07 NOTE — TELEPHONE ENCOUNTER
I spoke with him about his normal treadmill stress test.  Awaiting the results of the Zio patch which was placed today.

## 2018-08-08 RX ORDER — CARVEDILOL 6.25 MG/1
TABLET ORAL
Qty: 60 TABLET | Refills: 0 | Status: SHIPPED | OUTPATIENT
Start: 2018-08-08 | End: 2018-09-08 | Stop reason: SDUPTHER

## 2018-08-08 RX ORDER — NIFEDIPINE 60 MG/1
60 TABLET, FILM COATED, EXTENDED RELEASE ORAL
Qty: 30 TABLET | Refills: 0 | Status: SHIPPED | OUTPATIENT
Start: 2018-08-08 | End: 2018-09-08 | Stop reason: SDUPTHER

## 2018-08-23 ENCOUNTER — OFFICE VISIT (OUTPATIENT)
Dept: INTERNAL MEDICINE | Facility: CLINIC | Age: 68
End: 2018-08-23

## 2018-08-23 ENCOUNTER — EPISODE CHANGES (OUTPATIENT)
Dept: CASE MANAGEMENT | Facility: OTHER | Age: 68
End: 2018-08-23

## 2018-08-23 VITALS
HEART RATE: 91 BPM | SYSTOLIC BLOOD PRESSURE: 122 MMHG | RESPIRATION RATE: 18 BRPM | DIASTOLIC BLOOD PRESSURE: 74 MMHG | HEIGHT: 70 IN | OXYGEN SATURATION: 99 % | BODY MASS INDEX: 33.36 KG/M2 | WEIGHT: 233 LBS

## 2018-08-23 DIAGNOSIS — N28.9 ABNORMAL KIDNEY FUNCTION: ICD-10-CM

## 2018-08-23 DIAGNOSIS — D64.9 NORMOCYTIC ANEMIA: ICD-10-CM

## 2018-08-23 DIAGNOSIS — I10 ESSENTIAL HYPERTENSION: Primary | ICD-10-CM

## 2018-08-23 PROBLEM — S01.01XA SCALP LACERATION: Status: RESOLVED | Noted: 2018-07-18 | Resolved: 2018-08-23

## 2018-08-23 PROCEDURE — 99214 OFFICE O/P EST MOD 30 MIN: CPT | Performed by: INTERNAL MEDICINE

## 2018-08-23 RX ORDER — EPINEPHRINE 0.3 MG/.3ML
INJECTION SUBCUTANEOUS
COMMUNITY
Start: 2018-08-14 | End: 2021-08-02 | Stop reason: SDUPTHER

## 2018-08-23 NOTE — PROGRESS NOTES
Chief Complaint  Sachin Almanzar is a 68 y.o. male who presents for Fatigue and Abnormal Lab (Kidney function concern. )  .    History of Present Illness   Sachin is here for follow up on his HTN and abnl kidmey function..  He still has some fatigue but unsure if it is related to the recovery from the fall he sustained. He is getting up three times at night to go to the bathroom.  No cp or palp or dizziness or soa.  He has been worked up by cardiology for the syncope.  He is waiting to hear on the event monitor.  He seems to be feeling really good these days.  He was recently in [place] for his 68th birthday.        Review of Systems   Constitution: Positive for malaise/fatigue (minor). Negative for chills and fever.   Cardiovascular: Positive for leg swelling (chronic, wears compression socks). Negative for chest pain, dyspnea on exertion, palpitations and syncope.   Respiratory: Negative for shortness of breath.    Skin: Negative for poor wound healing.   Neurological: Negative for dizziness, headaches and light-headedness.       Patient Active Problem List   Diagnosis   • Allergic rhinitis   • Basal cell carcinoma   • Bladder cancer (CMS/HCC)   • Erectile dysfunction   • Gastroesophageal reflux disease   • Mike's disease   • History of colonic polyps   • Hyperlipidemia   • Hypertension   • Melanoma in situ of face (CMS/HCC)   • Nephrolithiasis   • Obesity (BMI 30-39.9)   • Obstructive sleep apnea on CPAP   • SVT (supraventricular tachycardia) (CMS/HCC)   • Venous insufficiency of both lower extremities   • Allergy to shellfish   • History of colon polyps   • Elevated serum glucose   • Multiple renal cysts   • Syncope   • ARF (acute renal failure) (CMS/HCC)   • Closed displaced fracture of lateral end of right clavicle   • Glaucoma   • Routine history and physical examination of adult   • Abnormal kidney function       Past Medical History:   Diagnosis Date   • Basal cell carcinoma of skin 7/10/2012    Overview:   NOSE   • Gastric ulcer    • GERD (gastroesophageal reflux disease)    • Hyperlipidemia    • Hypertension    • Kidney stones    • Malignant neoplasm of urinary bladder (CMS/HCC) 7/10/2012    Overview:  3/2011, Dr. Muniz, TURBT, BCG and infusion of chemo   • Melanoma in situ of face (CMS/HCC) 2016    Overview:  Removed per Dr. Steen (derm) on 2016   • Sleep apnea with use of continuous positive airway pressure (CPAP)    • SVT (supraventricular tachycardia) (CMS/HCC)        Past Surgical History:   Procedure Laterality Date   • APPENDECTOMY     • BLADDER TUMOR EXCISION     • CLAVICLE OPEN REDUCTION INTERNAL FIXATION Right 2018    Procedure: CLAVICLE OPEN REDUCTION INTERNAL FIXATION;  Surgeon: Cuate Elias MD;  Location: [facility] MAIN OR;  Service: Orthopedics   • COLONOSCOPY N/A 2017    Procedure: COLONOSCOPY INTO CECUM WITH COLD BX POLYPECTOMY ;  Surgeon: Domingo Cantrell MD;  Location: [facility] ENDOSCOPY;  Service:    • COSMETIC SURGERY      Nose - melanoma removal   • MOHS SURGERY      2 bcc   • TONSILLECTOMY         Family History   Problem Relation Age of Onset   • Heart attack Mother          age 55   • Cancer Father          at age 44   • Diabetes Maternal Grandmother    • Hypertension Paternal Uncle        Social History     Social History   • Marital status:      Spouse name: Julita   • Number of children: 1   • Years of education: N/A     Occupational History   • retired president of [employer]      Social History Main Topics   • Smoking status: Never Smoker   • Smokeless tobacco: Never Used   • Alcohol use 1.2 - 1.8 oz/week     2 - 3 Cans of beer per week      Comment: 2-3 beers 5 days a week   • Drug use: No   • Sexual activity: Defer     Other Topics Concern   • Not on file     Social History Narrative   • No narrative on file       Current Outpatient Prescriptions on File Prior to Visit   Medication Sig Dispense Refill   • aspirin 81 MG EC tablet Take 81 mg by mouth  "Daily.     • atorvastatin (LIPITOR) 20 MG tablet TAKE 1 TABLET EVERY NIGHT 90 tablet 3   • carvedilol (COREG) 6.25 MG tablet TAKE 1 TABLET BY MOUTH EVERY 12 HOURS 60 tablet 0   • fexofenadine (ALLEGRA) 180 MG tablet Take 180 mg by mouth Daily.     • lidocaine (LIDODERM) 5 % Place 1 patch on the skin Daily. Remove & Discard patch within 12 hours or as directed by MD 30 each 0   • melatonin 5 MG tablet tablet Take 5 mg by mouth At Night As Needed (insommnia).     • NIFEdipine CC (ADALAT CC) 60 MG 24 hr tablet TAKE 1 TABLET BY MOUTH DAILY 30 tablet 0   • polyethylene glycol (MIRALAX) pack packet Take 17 g by mouth Daily. 15 each 0   • raNITIdine (ZANTAC) 300 MG tablet Take 300 mg by mouth Every Night.     • sildenafil (VIAGRA) 100 MG tablet Take 100 mg by mouth As Needed for erectile dysfunction.     • [DISCONTINUED] Multiple Vitamin (MULTIVITAMIN) capsule Take 1 capsule by mouth Daily.     • [DISCONTINUED] HYDROcodone-acetaminophen (NORCO) 5-325 MG per tablet Take 1 tablet by mouth Every 6 (Six) Hours As Needed for Moderate Pain . 12 tablet 0     No current facility-administered medications on file prior to visit.        Allergies   Allergen Reactions   • Ace Inhibitors    • Shellfish-Derived Products Hives     Throat swelling, itching       Vitals:    08/23/18 1410   BP: 122/74   Pulse: 91   Resp: 18   SpO2: 99%   Weight: 106 kg (233 lb)   Height: 177.8 cm (70\")       Body mass index is 33.43 kg/m².    Objective   Physical Exam   Constitutional: He is oriented to person, place, and time. He appears well-developed and well-nourished. No distress.   HENT:   Head: Normocephalic and atraumatic.   Eyes: Conjunctivae are normal. No scleral icterus.   Neck: Normal range of motion. Neck supple.   Cardiovascular: Normal rate, regular rhythm and normal heart sounds.  Exam reveals no gallop and no friction rub.    No murmur heard.  Pulmonary/Chest: Effort normal and breath sounds normal. No respiratory distress. He has no " wheezes. He has no rales.   Musculoskeletal: Normal range of motion. He exhibits no edema.   Lymphadenopathy:     He has no cervical adenopathy.   Neurological: He is alert and oriented to person, place, and time. No cranial nerve deficit.   Psychiatric: He has a normal mood and affect. His behavior is normal. Judgment and thought content normal.       Results for orders placed or performed during the hospital encounter of 08/07/18   Treadmill Stress Test   Result Value Ref Range    BH CV STRESS PROTOCOL 1 [place]     Stage 1 1     HR Stage 1 115     BP Stage 1 160/80     Duration Min Stage 1 3     Duration Sec Stage 1 0     Grade Stage 1 10     Speed Stage 1 1.7     BH CV STRESS METS STAGE 1 5     Stage 2 2     HR Stage 2 150     BP Stage 2 166/80     Duration Min Stage 2 3     Duration Sec Stage 2 0     Grade Stage 2 12     Speed Stage 2 2.5     BH CV STRESS METS STAGE 2 7.5     Baseline HR 94 bpm    Baseline /72 mmHg    Peak  bpm    Percent Max Pred .00 %    Percent Target  %    Peak /80 mmHg    Recovery HR 78 bpm    Recovery /70 mmHg    Target HR (85%) 128 bpm    Max. Pred. HR (100%) 150 bpm    Exercise duration (min) 6 min    Exercise duration (sec) 0 sec    Estimated workload 7.0 METS       Assessment/Plan   Diagnoses and all orders for this visit:    Essential hypertension    Abnormal kidney function  -     Basic Metabolic Panel    Normocytic anemia  -     CBC & Differential    Other orders  -     EPINEPHrine (EPIPEN) 0.3 MG/0.3ML solution auto-injector injection;         Discussion/Summary  Sachin is here for follow up.  His bp is well controlled.  He has not had any further syncope.  He has not had any dizziness.  We are going to recheck his kidney function and blood counts today.  I will see him back in 4 months for follow up.  He will gradually increase his activity now.    Return in about 4 months (around 12/23/2018) for Next scheduled follow up, with nonfasting labs  prior.

## 2018-08-24 LAB
BASOPHILS # BLD AUTO: 0.02 10*3/MM3 (ref 0–0.2)
BASOPHILS NFR BLD AUTO: 0.4 % (ref 0–1.5)
BUN SERPL-MCNC: 21 MG/DL (ref 8–23)
BUN/CREAT SERPL: 17.9 (ref 7–25)
CALCIUM SERPL-MCNC: 10 MG/DL (ref 8.6–10.5)
CHLORIDE SERPL-SCNC: 105 MMOL/L (ref 98–107)
CO2 SERPL-SCNC: 25.9 MMOL/L (ref 22–29)
CREAT SERPL-MCNC: 1.17 MG/DL (ref 0.76–1.27)
EOSINOPHIL # BLD AUTO: 0.31 10*3/MM3 (ref 0–0.7)
EOSINOPHIL NFR BLD AUTO: 6.1 % (ref 0.3–6.2)
ERYTHROCYTE [DISTWIDTH] IN BLOOD BY AUTOMATED COUNT: 13.5 % (ref 11.5–14.5)
GLUCOSE SERPL-MCNC: 91 MG/DL (ref 65–99)
HCT VFR BLD AUTO: 35.4 % (ref 40.4–52.2)
HGB BLD-MCNC: 12.3 G/DL (ref 13.7–17.6)
IMM GRANULOCYTES # BLD: 0.02 10*3/MM3 (ref 0–0.03)
IMM GRANULOCYTES NFR BLD: 0.4 % (ref 0–0.5)
LYMPHOCYTES # BLD AUTO: 0.55 10*3/MM3 (ref 0.9–4.8)
LYMPHOCYTES NFR BLD AUTO: 10.9 % (ref 19.6–45.3)
MCH RBC QN AUTO: 32.1 PG (ref 27–32.7)
MCHC RBC AUTO-ENTMCNC: 34.7 G/DL (ref 32.6–36.4)
MCV RBC AUTO: 92.4 FL (ref 79.8–96.2)
MONOCYTES # BLD AUTO: 0.83 10*3/MM3 (ref 0.2–1.2)
MONOCYTES NFR BLD AUTO: 16.4 % (ref 5–12)
NEUTROPHILS # BLD AUTO: 3.34 10*3/MM3 (ref 1.9–8.1)
NEUTROPHILS NFR BLD AUTO: 66.2 % (ref 42.7–76)
PLATELET # BLD AUTO: 214 10*3/MM3 (ref 140–500)
POTASSIUM SERPL-SCNC: 3.7 MMOL/L (ref 3.5–5.2)
RBC # BLD AUTO: 3.83 10*6/MM3 (ref 4.6–6)
SODIUM SERPL-SCNC: 142 MMOL/L (ref 136–145)
WBC # BLD AUTO: 5.05 10*3/MM3 (ref 4.5–10.7)

## 2018-08-31 ENCOUNTER — TELEPHONE (OUTPATIENT)
Dept: CARDIOLOGY | Facility: CLINIC | Age: 68
End: 2018-08-31

## 2018-09-04 NOTE — TELEPHONE ENCOUNTER
09/04/18  2:51 PM  Sachin Almanzar  1950    Home Phone [phone]   Mobile [phone]       Sachin Almanzar calls for results of zio patch. Second request.  Hoa Lance RN  Triage nurse

## 2018-09-04 NOTE — TELEPHONE ENCOUNTER
I appreciate his requests, however I do not have a readout or the tracings to read.  I'm not sure what to tell him

## 2018-09-10 RX ORDER — CARVEDILOL 6.25 MG/1
TABLET ORAL
Qty: 60 TABLET | Refills: 3 | Status: SHIPPED | OUTPATIENT
Start: 2018-09-10 | End: 2018-12-26 | Stop reason: SDUPTHER

## 2018-09-10 RX ORDER — NIFEDIPINE 60 MG/1
60 TABLET, FILM COATED, EXTENDED RELEASE ORAL
Qty: 30 TABLET | Refills: 3 | Status: SHIPPED | OUTPATIENT
Start: 2018-09-10 | End: 2018-12-26 | Stop reason: SDUPTHER

## 2018-09-12 NOTE — TELEPHONE ENCOUNTER
Pt called back. He would like to know if it would be advisable to make an appt to go over all of his recent results with you or Olamide. He would like to know what caused the syncopal episode. He can be reached at #[phone]. Please advise.    Thanks,  Lora

## 2018-09-28 ENCOUNTER — FLU SHOT (OUTPATIENT)
Dept: INTERNAL MEDICINE | Facility: CLINIC | Age: 68
End: 2018-09-28

## 2018-09-28 PROCEDURE — 90662 IIV NO PRSV INCREASED AG IM: CPT | Performed by: INTERNAL MEDICINE

## 2018-09-28 PROCEDURE — G0008 ADMIN INFLUENZA VIRUS VAC: HCPCS | Performed by: INTERNAL MEDICINE

## 2018-10-11 ENCOUNTER — EPISODE CHANGES (OUTPATIENT)
Dept: CASE MANAGEMENT | Facility: OTHER | Age: 68
End: 2018-10-11

## 2018-12-26 RX ORDER — CARVEDILOL 6.25 MG/1
TABLET ORAL
Qty: 60 TABLET | Refills: 5 | Status: SHIPPED | OUTPATIENT
Start: 2018-12-26 | End: 2019-06-21 | Stop reason: SDUPTHER

## 2018-12-26 RX ORDER — NIFEDIPINE 60 MG/1
60 TABLET, FILM COATED, EXTENDED RELEASE ORAL
Qty: 30 TABLET | Refills: 5 | Status: SHIPPED | OUTPATIENT
Start: 2018-12-26 | End: 2019-06-17 | Stop reason: SDUPTHER

## 2019-01-07 DIAGNOSIS — E78.2 MIXED HYPERLIPIDEMIA: Primary | ICD-10-CM

## 2019-01-07 DIAGNOSIS — I10 ESSENTIAL HYPERTENSION: ICD-10-CM

## 2019-01-07 RX ORDER — CYCLOBENZAPRINE HCL 10 MG
10 TABLET ORAL 3 TIMES DAILY PRN
Qty: 12 TABLET | Refills: 0 | Status: SHIPPED | OUTPATIENT
Start: 2019-01-07 | End: 2019-05-09

## 2019-01-07 RX ORDER — METHYLPREDNISOLONE 4 MG/1
TABLET ORAL
Qty: 21 TABLET | Refills: 0 | Status: SHIPPED | OUTPATIENT
Start: 2019-01-07 | End: 2019-05-09

## 2019-01-08 LAB
ALBUMIN SERPL-MCNC: 4.3 G/DL (ref 3.5–5.2)
ALBUMIN/GLOB SERPL: 1.5 G/DL
ALP SERPL-CCNC: 52 U/L (ref 39–117)
ALT SERPL-CCNC: 22 U/L (ref 1–41)
AST SERPL-CCNC: 17 U/L (ref 1–40)
BASOPHILS # BLD AUTO: 0.01 10*3/MM3 (ref 0–0.2)
BASOPHILS NFR BLD AUTO: 0.1 % (ref 0–1.5)
BILIRUB SERPL-MCNC: 0.3 MG/DL (ref 0.1–1.2)
BUN SERPL-MCNC: 27 MG/DL (ref 8–23)
BUN/CREAT SERPL: 20.5 (ref 7–25)
CALCIUM SERPL-MCNC: 10.9 MG/DL (ref 8.6–10.5)
CHLORIDE SERPL-SCNC: 99 MMOL/L (ref 98–107)
CO2 SERPL-SCNC: 24.9 MMOL/L (ref 22–29)
CREAT SERPL-MCNC: 1.32 MG/DL (ref 0.76–1.27)
EOSINOPHIL # BLD AUTO: 0.02 10*3/MM3 (ref 0–0.7)
EOSINOPHIL NFR BLD AUTO: 0.2 % (ref 0.3–6.2)
ERYTHROCYTE [DISTWIDTH] IN BLOOD BY AUTOMATED COUNT: 14 % (ref 11.5–14.5)
GLOBULIN SER CALC-MCNC: 2.8 GM/DL
GLUCOSE SERPL-MCNC: 115 MG/DL (ref 65–99)
HCT VFR BLD AUTO: 39.6 % (ref 40.4–52.2)
HGB BLD-MCNC: 13.1 G/DL (ref 13.7–17.6)
IMM GRANULOCYTES # BLD AUTO: 0.05 10*3/MM3 (ref 0–0.03)
IMM GRANULOCYTES NFR BLD AUTO: 0.5 % (ref 0–0.5)
LYMPHOCYTES # BLD AUTO: 0.6 10*3/MM3 (ref 0.9–4.8)
LYMPHOCYTES NFR BLD AUTO: 5.9 % (ref 19.6–45.3)
MCH RBC QN AUTO: 31.3 PG (ref 27–32.7)
MCHC RBC AUTO-ENTMCNC: 33.1 G/DL (ref 32.6–36.4)
MCV RBC AUTO: 94.5 FL (ref 79.8–96.2)
MONOCYTES # BLD AUTO: 1.01 10*3/MM3 (ref 0.2–1.2)
MONOCYTES NFR BLD AUTO: 9.9 % (ref 5–12)
NEUTROPHILS # BLD AUTO: 8.6 10*3/MM3 (ref 1.9–8.1)
NEUTROPHILS NFR BLD AUTO: 83.9 % (ref 42.7–76)
PLATELET # BLD AUTO: 235 10*3/MM3 (ref 140–500)
POTASSIUM SERPL-SCNC: 4.7 MMOL/L (ref 3.5–5.2)
PROT SERPL-MCNC: 7.1 G/DL (ref 6–8.5)
RBC # BLD AUTO: 4.19 10*6/MM3 (ref 4.6–6)
SODIUM SERPL-SCNC: 135 MMOL/L (ref 136–145)
WBC # BLD AUTO: 10.24 10*3/MM3 (ref 4.5–10.7)

## 2019-01-10 ENCOUNTER — OFFICE VISIT (OUTPATIENT)
Dept: INTERNAL MEDICINE | Facility: CLINIC | Age: 69
End: 2019-01-10

## 2019-01-10 VITALS
WEIGHT: 233 LBS | OXYGEN SATURATION: 99 % | BODY MASS INDEX: 33.36 KG/M2 | HEART RATE: 80 BPM | HEIGHT: 70 IN | SYSTOLIC BLOOD PRESSURE: 128 MMHG | DIASTOLIC BLOOD PRESSURE: 78 MMHG

## 2019-01-10 DIAGNOSIS — R59.0 HILAR LYMPHADENOPATHY: ICD-10-CM

## 2019-01-10 DIAGNOSIS — R79.89 ABNORMAL CBC: ICD-10-CM

## 2019-01-10 DIAGNOSIS — M54.50 ACUTE BILATERAL LOW BACK PAIN WITHOUT SCIATICA: ICD-10-CM

## 2019-01-10 DIAGNOSIS — I10 ESSENTIAL HYPERTENSION: Primary | ICD-10-CM

## 2019-01-10 DIAGNOSIS — N18.30 STAGE 3 CHRONIC KIDNEY DISEASE (HCC): ICD-10-CM

## 2019-01-10 PROBLEM — N17.9 ARF (ACUTE RENAL FAILURE) (HCC): Status: RESOLVED | Noted: 2018-07-18 | Resolved: 2019-01-10

## 2019-01-10 PROCEDURE — 99214 OFFICE O/P EST MOD 30 MIN: CPT | Performed by: INTERNAL MEDICINE

## 2019-01-10 NOTE — PROGRESS NOTES
Chief Complaint  Sachin Almanzar is a 68 y.o. male who presents for Follow-up (kidney function) and Back Pain  .    History of Present Illness   Sachin is here for routine follow up on his HTN, kidney function, back pain, HLD.  He hurt his back moving boxes.   The pain in in his lower back - midline;  The prednisone has really helped.  He only had to use the muscle relaxer one day.  He would like to do some PT so this does not keep happening,    No cp or palp or dizziness or soa or edema.   We discussed his fluctuations in kidney function.  He has had multiple exposures over time that have likely contributed to his decrease in kidney function - IV contrast, kidney stones, treatment for bladder cancer.  He is avoiding NSAIDS.  We discussed that on his CT from July, enlarged lymph nodes were noted.  He denies any soa or cough.        Review of Systems   Constitution: Negative for weakness and malaise/fatigue.   Cardiovascular: Negative for chest pain, dyspnea on exertion, leg swelling and palpitations.   Respiratory: Negative for cough, shortness of breath and sputum production.    Musculoskeletal: Positive for back pain. Negative for myalgias.   Neurological: Negative for dizziness and light-headedness.       Patient Active Problem List   Diagnosis   • Allergic rhinitis   • Basal cell carcinoma   • Bladder cancer (CMS/HCC)   • Erectile dysfunction   • Gastroesophageal reflux disease   • [place]'s disease   • Hyperlipidemia   • Hypertension   • Melanoma in situ of face (CMS/HCC)   • Nephrolithiasis   • Obesity (BMI 30-39.9)   • Obstructive sleep apnea on CPAP   • SVT (supraventricular tachycardia) (CMS/HCC)   • Venous insufficiency of both lower extremities   • Allergy to shellfish   • History of colon polyps   • Elevated serum glucose   • Multiple renal cysts   • Syncope   • Closed displaced fracture of lateral end of right clavicle   • Glaucoma   • Abnormal kidney function   • Stage 3 chronic kidney disease  (CMS/HCC)       Past Medical History:   Diagnosis Date   • ARF (acute renal failure) (CMS/HCC) 2018   • Basal cell carcinoma of skin 7/10/2012    Overview:  NOSE   • Gastric ulcer    • GERD (gastroesophageal reflux disease)    • Hyperlipidemia    • Hypertension    • Kidney stones    • Malignant neoplasm of urinary bladder (CMS/HCC) 7/10/2012    Overview:  3/2011, Dr. Muniz, TURBT, BCG and infusion of chemo   • Melanoma in situ of face (CMS/HCC) 2016    Overview:  Removed per Dr. Steen (derm) on 2016   • Sleep apnea with use of continuous positive airway pressure (CPAP)    • SVT (supraventricular tachycardia) (CMS/HCC)        Past Surgical History:   Procedure Laterality Date   • APPENDECTOMY     • BLADDER TUMOR EXCISION     • CLAVICLE OPEN REDUCTION INTERNAL FIXATION Right 2018    Procedure: CLAVICLE OPEN REDUCTION INTERNAL FIXATION;  Surgeon: Cuate Elias MD;  Location: [facility] OR;  Service: Orthopedics   • COLONOSCOPY N/A 2017    Procedure: COLONOSCOPY INTO CECUM WITH COLD BX POLYPECTOMY ;  Surgeon: Domingo Cantrell MD;  Location: [facility] ENDOSCOPY;  Service:    • COSMETIC SURGERY      Nose - melanoma removal   • MOHS SURGERY      2 bcc   • TONSILLECTOMY         Family History   Problem Relation Age of Onset   • Heart attack Mother          age 55   • Cancer Father          at age 44   • Diabetes Maternal Grandmother    • Hypertension Paternal Uncle        Social History     Socioeconomic History   • Marital status:      Spouse name: Julita   • Number of children: 1   • Years of education: Not on file   • Highest education level: Not on file   Social Needs   • Financial resource strain: Not on file   • Food insecurity - worry: Not on file   • Food insecurity - inability: Not on file   • Transportation needs - medical: Not on file   • Transportation needs - non-medical: Not on file   Occupational History   • Occupation: retired president of [employer]   Tobacco Use   •  "Smoking status: Never Smoker   • Smokeless tobacco: Never Used   Substance and Sexual Activity   • Alcohol use: Yes     Alcohol/week: 1.2 - 1.8 oz     Types: 2 - 3 Cans of beer per week     Comment: 2-3 beers 5 days a week   • Drug use: No   • Sexual activity: Defer   Other Topics Concern   • Not on file   Social History Narrative   • Not on file       Current Outpatient Medications on File Prior to Visit   Medication Sig Dispense Refill   • aspirin 81 MG EC tablet Take 81 mg by mouth Daily.     • atorvastatin (LIPITOR) 20 MG tablet TAKE 1 TABLET EVERY NIGHT 90 tablet 3   • carvedilol (COREG) 6.25 MG tablet TAKE 1 TABLET BY MOUTH EVERY 12 HOURS 60 tablet 5   • cyclobenzaprine (FLEXERIL) 10 MG tablet Take 1 tablet by mouth 3 (Three) Times a Day As Needed for Muscle Spasms. 12 tablet 0   • EPINEPHrine (EPIPEN) 0.3 MG/0.3ML solution auto-injector injection      • fexofenadine (ALLEGRA) 180 MG tablet Take 180 mg by mouth Daily.     • melatonin 5 MG tablet tablet Take 5 mg by mouth At Night As Needed (insommnia).     • MethylPREDNISolone (MEDROL) 4 MG tablet follow package directions 21 tablet 0   • NIFEdipine CC (ADALAT CC) 60 MG 24 hr tablet TAKE 1 TABLET BY MOUTH DAILY 30 tablet 5   • raNITIdine (ZANTAC) 300 MG tablet Take 300 mg by mouth Every Night.     • sildenafil (VIAGRA) 100 MG tablet Take 100 mg by mouth As Needed for erectile dysfunction.     • [DISCONTINUED] polyethylene glycol (MIRALAX) pack packet Take 17 g by mouth Daily. 15 each 0   • [DISCONTINUED] lidocaine (LIDODERM) 5 % Place 1 patch on the skin Daily. Remove & Discard patch within 12 hours or as directed by MD 30 each 0     No current facility-administered medications on file prior to visit.        Allergies   Allergen Reactions   • Ace Inhibitors    • Shellfish-Derived Products Hives     Throat swelling, itching       Vitals:    01/10/19 0758   BP: 128/78   Pulse: 80   SpO2: 99%   Weight: 106 kg (233 lb)   Height: 177.8 cm (70\")       Body mass " index is 33.43 kg/m².    Objective   Physical Exam   Constitutional: He is oriented to person, place, and time. He appears well-developed and well-nourished. No distress.   HENT:   Head: Normocephalic and atraumatic.   Eyes: Conjunctivae are normal. No scleral icterus.   Neck: Normal range of motion. Neck supple.   Cardiovascular: Normal rate, regular rhythm and normal heart sounds. Exam reveals no gallop and no friction rub.   No murmur heard.  Pulmonary/Chest: Effort normal and breath sounds normal. No respiratory distress. He has no wheezes. He has no rales.   Musculoskeletal: Normal range of motion. He exhibits no edema.        Lumbar back: He exhibits normal range of motion, no tenderness and no bony tenderness.   Lymphadenopathy:     He has no cervical adenopathy.   Neurological: He is alert and oriented to person, place, and time. No cranial nerve deficit.   Psychiatric: He has a normal mood and affect. His behavior is normal. Judgment and thought content normal.       Results for orders placed or performed in visit on 01/07/19   Comprehensive Metabolic Panel   Result Value Ref Range    Glucose 115 (H) 65 - 99 mg/dL    BUN 27 (H) 8 - 23 mg/dL    Creatinine 1.32 (H) 0.76 - 1.27 mg/dL    eGFR Non African Am 54 (L) >60 mL/min/1.73    eGFR African Am 65 >60 mL/min/1.73    BUN/Creatinine Ratio 20.5 7.0 - 25.0    Sodium 135 (L) 136 - 145 mmol/L    Potassium 4.7 3.5 - 5.2 mmol/L    Chloride 99 98 - 107 mmol/L    Total CO2 24.9 22.0 - 29.0 mmol/L    Calcium 10.9 (H) 8.6 - 10.5 mg/dL    Total Protein 7.1 6.0 - 8.5 g/dL    Albumin 4.30 3.50 - 5.20 g/dL    Globulin 2.8 gm/dL    A/G Ratio 1.5 g/dL    Total Bilirubin 0.3 0.1 - 1.2 mg/dL    Alkaline Phosphatase 52 39 - 117 U/L    AST (SGOT) 17 1 - 40 U/L    ALT (SGPT) 22 1 - 41 U/L   CBC & Differential   Result Value Ref Range    WBC 10.24 4.50 - 10.70 10*3/mm3    RBC 4.19 (L) 4.60 - 6.00 10*6/mm3    Hemoglobin 13.1 (L) 13.7 - 17.6 g/dL    Hematocrit 39.6 (L) 40.4 - 52.2 %     MCV 94.5 79.8 - 96.2 fL    MCH 31.3 27.0 - 32.7 pg    MCHC 33.1 32.6 - 36.4 g/dL    RDW 14.0 11.5 - 14.5 %    Platelets 235 140 - 500 10*3/mm3    Neutrophil Rel % 83.9 (H) 42.7 - 76.0 %    Lymphocyte Rel % 5.9 (L) 19.6 - 45.3 %    Monocyte Rel % 9.9 5.0 - 12.0 %    Eosinophil Rel % 0.2 (L) 0.3 - 6.2 %    Basophil Rel % 0.1 0.0 - 1.5 %    Neutrophils Absolute 8.60 (H) 1.90 - 8.10 10*3/mm3    Lymphocytes Absolute 0.60 (L) 0.90 - 4.80 10*3/mm3    Monocytes Absolute 1.01 0.20 - 1.20 10*3/mm3    Eosinophils Absolute 0.02 0.00 - 0.70 10*3/mm3    Basophils Absolute 0.01 0.00 - 0.20 10*3/mm3    Immature Granulocyte Rel % 0.5 0.0 - 0.5 %    Immature Grans Absolute 0.05 (H) 0.00 - 0.03 10*3/mm3       Assessment/Plan   Diagnoses and all orders for this visit:    Essential hypertension    Stage 3 chronic kidney disease (CMS/HCC)    Hilar lymphadenopathy  -     CT Chest Without Contrast; Future    Acute bilateral low back pain without sciatica  -     Ambulatory Referral to Physical Therapy Evaluate and treat    Abnormal CBC        Discussion/Summary  Sachin is here for routine follow up.  His bp is well controlled.  His creatinine is back up some.  I suspect this is from the contrasted ct he had when he broke his clavicle.  We need to repeat imaging of his lungs to reassess his hilar lymphadenopathy.  His differential on his CBC was off but he was on medrol when this was drawn.  Nonetheless, we are going to repeat a CBC in three weeks.  Advised to avoid all NSAIDS.  Avoid IV contrast if at all possible.  Maintain good hydration and good bp control.    Return in about 3 weeks (around 1/31/2019) for labs only; 6 mo cpe with labs prior.

## 2019-01-14 ENCOUNTER — HOSPITAL ENCOUNTER (OUTPATIENT)
Dept: PHYSICAL THERAPY | Facility: HOSPITAL | Age: 69
Setting detail: THERAPIES SERIES
Discharge: HOME | End: 2019-01-14

## 2019-01-14 DIAGNOSIS — M54.50 ACUTE BILATERAL LOW BACK PAIN WITHOUT SCIATICA: Primary | ICD-10-CM

## 2019-01-14 PROCEDURE — 97162 PT EVAL MOD COMPLEX 30 MIN: CPT | Performed by: PHYSICAL THERAPIST

## 2019-01-14 PROCEDURE — 97110 THERAPEUTIC EXERCISES: CPT | Performed by: PHYSICAL THERAPIST

## 2019-01-14 NOTE — THERAPY EVALUATION
Outpatient Physical Therapy Ortho Initial Evaluation  [facility]     Patient Name: Sachin Almanzar  : 1950  MRN: [id number]  Today's Date: 2019      Visit Date: 2019    Patient Active Problem List   Diagnosis   • Allergic rhinitis   • Basal cell carcinoma   • Bladder cancer (CMS/HCC)   • Erectile dysfunction   • Gastroesophageal reflux disease   • [place]'s disease   • Hyperlipidemia   • Hypertension   • Melanoma in situ of face (CMS/HCC)   • Nephrolithiasis   • Obesity (BMI 30-39.9)   • Obstructive sleep apnea on CPAP   • SVT (supraventricular tachycardia) (CMS/HCC)   • Venous insufficiency of both lower extremities   • Allergy to shellfish   • History of colon polyps   • Elevated serum glucose   • Multiple renal cysts   • Syncope   • Closed displaced fracture of lateral end of right clavicle   • Glaucoma   • Abnormal kidney function   • Stage 3 chronic kidney disease (CMS/HCC)        Past Medical History:   Diagnosis Date   • ARF (acute renal failure) (CMS/HCC) 2018   • Basal cell carcinoma of skin 7/10/2012    Overview:  NOSE   • Gastric ulcer    • GERD (gastroesophageal reflux disease)    • Hyperlipidemia    • Hypertension    • Kidney stones    • Malignant neoplasm of urinary bladder (CMS/HCC) 7/10/2012    Overview:  3/2011, Dr. Muniz, TURBT, BCG and infusion of chemo   • Melanoma in situ of face (CMS/HCC) 2016    Overview:  Removed per Dr. Steen (derm) on 2016   • Sleep apnea with use of continuous positive airway pressure (CPAP)    • SVT (supraventricular tachycardia) (CMS/HCC)         Past Surgical History:   Procedure Laterality Date   • APPENDECTOMY     • BLADDER TUMOR EXCISION     • CLAVICLE OPEN REDUCTION INTERNAL FIXATION Right 2018    Procedure: CLAVICLE OPEN REDUCTION INTERNAL FIXATION;  Surgeon: Cuate Elias MD;  Location: [facility] OR;  Service: Orthopedics   • COLONOSCOPY N/A 2017    Procedure: COLONOSCOPY INTO CECUM WITH COLD BX  "POLYPECTOMY ;  Surgeon: Domingo Cantrell MD;  Location: [facility] ENDOSCOPY;  Service:    • COSMETIC SURGERY      Nose - melanoma removal   • MOHS SURGERY      2 bcc   • TONSILLECTOMY  1951       Visit Dx:     ICD-10-CM ICD-9-CM   1. Acute bilateral low back pain without sciatica M54.5 724.2     338.19       Patient History     Row Name 01/14/19 1200             History    Chief Complaint  Pain;Difficulty with daily activities  -GR      Date Current Problem(s) Began  12/31/18  -GR      Brief Description of Current Complaint  Reports 2 instances of low back pain - first time in May of 2018 and now again 2 weeks ago. Both instances have been improved with prednisone. He relates these issues to moving boxes.  Pain stays relatively local to his spine, did have a few issues of \"sciatica\" to his L side but this is not constant. When his pain is severe he feels he has to reach for objects to keep himself up; denies falls because of this but did fall in July due to snycope and fractured his R clavicle; this was later fixated and he underwent home therapy.  He has had xrays revealing arthritic changes to his spine and also a vascular screen at [place]. He has a treadmill at home but no formal exercise program.  Would like to learn what to do and prevent recurrence of this issue, now has access to silver sneakers but has not signed up. Would be open to joining a gym.  -GR      Patient/Caregiver Goals  Know what to do to help the symptoms;Relieve pain  -GR      Occupation/sports/leisure activities  Retired  -GR         Pain     Pain Location  Back  -GR      Pain at Present  2  -GR      Pain at Best  1  -GR      Pain at Worst  8  -GR         Fall Risk Assessment    Any falls in the past year:  Yes  -GR      Number of falls reported in the last 12 months  1  -GR      Factors that contributed to the fall:  Other (comment) syncope  -GR         Services    Do you plan to receive Home Health services in the near future  No  -GR      "    Daily Activities    Primary Language  English  -GR      Pt Participated in POC and Goals  Yes  -GR         Safety    Are you being hurt, hit, or frightened by anyone at home or in your life?  No  -GR      Are you being neglected by a caregiver  No  -GR        User Key  (r) = Recorded By, (t) = Taken By, (c) = Cosigned By    Initials Name Provider Type    GR Bakari Velazquez, PT Physical Therapist          PT Ortho     Row Name 01/14/19 1100       Posture/Observations    Alignment Options  Lumbar lordosis  -GR    Lumbar lordosis  Increased  -GR       Quarter Clearing    Quarter Clearing  Lower Quarter Clearing  -GR       DTR- Lower Quarter Clearing    Patellar tendon (L2-4)  Bilateral:;2- Normal response  -GR    Achilles tendon (S1-2)  Bilateral:;2- Normal response  -GR       Neural Tension Signs- Lower Quarter Clearing    Slump  Bilateral:;Negative gastroc stretch reported B  -GR    SLR  Bilateral:;Negative  -GR       Myotomal Screen- Lower Quarter Clearing    Hip flexion (L2)  Bilateral:;4 (Good)  -GR    Knee extension (L3)  Bilateral:;4 (Good)  -GR    Ankle DF (L4)  Bilateral:;4 (Good)  -GR    Great toe extension (L5)  Bilateral:;4 (Good)  -GR    Knee flexion (S2)  Bilateral:;4 (Good)  -GR       Lumbar ROM Screen- Lower Quarter Clearing    Lumbar Flexion  Impaired mid tibia; hamstrings limiting  -GR    Lumbar Extension  Impaired 50% painfree  -GR    Lumbar Lateral Flexion  Normal proximal tibia B with trunk flexion compensation  -GR    Lumbar Rotation  Normal no pain  -GR       Special Tests/Palpation    Special Tests/Palpation  Lumbar/SI  -GR       Lumbar/SI Special Tests    MAGGIE (hip vs. SI Dysfunction)  Bilateral:;Positive  -GR    FAIR Test (Piriformis Syndrome)  Bilateral:;Negative  -GR       Lumbosacral Palpation    Lumbosacral Palpation?  No Tenderness/Abnormality  -GR       General ROM    RT Lower Ext  --  -GR    LT Lower Ext  --  -GR       MMT (Manual Muscle Testing)    Rt Lower Ext  Rt Hip ABduction   -GR    Lt Lower Ext  Lt Hip ABduction  -GR       MMT Right Lower Ext    Rt Hip ABduction MMT, Gross Movement  (3+/5) fair plus  -GR       MMT Left Lower Ext    Lt Hip ABduction MMT, Gross Movement  (3+/5) fair plus  -GR       Flexibility    Flexibility Tested?  Lower Extremity  -GR       Lower Extremity Flexibility    Hamstrings  Right:;Severely limited;Left:;Moderately limited  -GR    Hip External Rotators  Bilateral:;Moderately limited  -GR    Hip Internal Rotators  Bilateral:;Moderately limited  -GR      User Key  (r) = Recorded By, (t) = Taken By, (c) = Cosigned By    Initials Name Provider Type    GR Bakari Velazquez, PT Physical Therapist                      Therapy Education  Education Details: role of outpatient PT, POC, expectations, thorough review and performance of initial HEP  Given: HEP, Posture/body mechanics  Program: New  How Provided: Verbal, Demonstration, Written  Provided to: Patient  Level of Understanding: Teach back education performed, Verbalized, Demonstrated     PT OP Goals     Row Name 01/14/19 1300          PT Short Term Goals    STG Date to Achieve  01/29/19  -GR     STG 1  Patient will be independent with initial HEP.  -GR     STG 1 Progress  New  -GR     STG 2  Patient will report 30 minutes of exercise 2-3 days/week for optimal injury prevention.  -GR     STG 2 Progress  New  -        Long Term Goals    LTG Date to Achieve  02/28/19  -GR     LTG 1  Patient will score </=20% disability on the modified TERESSA to indicate improved perceived ADL performance.  -GR     LTG 1 Progress  New  -GR     LTG 2  Patient will demonstrate safe lifting/body mechanics for light to medium weight objects for spine preservation.  -GR     LTG 2 Progress  New  -GR     LTG 3  Patient will be independent with progressive HEP for long term management of current condition.  -GR     LTG 3 Progress  New  -GR     LTG 4  Patient will demonstrate improved hamstring length from severely to moderately limited on the  R to ease transitional movements.  -GR     LTG 4 Progress  New  -GR        Time Calculation    PT Goal Re-Cert Due Date  04/14/19  -GR       User Key  (r) = Recorded By, (t) = Taken By, (c) = Cosigned By    Initials Name Provider Type    GR Bakari Velazquez, PT Physical Therapist          PT Assessment/Plan     Row Name 01/14/19 7850          PT Assessment    Functional Limitations  Limitations in community activities;Limitation in home management;Performance in self-care ADL  -GR     Impairments  Muscle strength;Pain;Poor body mechanics;Posture;Range of motion  -GR     Assessment Comments  67 y/o M referred to outpatient PT with c/o evolving LBP insidious onset x 2 weeks improved after prednisone. S/s are consistent with referring diagnosis and no longer radicular.  Lumbar AROM limited with flex/ext and he demonstrates severe hamstring and piriformis flexibility limitations. His function is impacted with decreased community involvement. PMH significant for degenerative changes, HTN, and R clavicular fx x 6 mos ago.  Recommend skilled PT to address current deficits. Thank you for this referral.  -GR     Please refer to paper survey for additional self-reported information  Yes  -GR     Rehab Potential  Good  -GR     Patient/caregiver participated in establishment of treatment plan and goals  Yes  -GR     Patient would benefit from skilled therapy intervention  Yes  -GR        PT Plan    PT Frequency  1x/week  -GR     Predicted Duration of Therapy Intervention (Therapy Eval)  4 weeks;1x/week at patient request  -GR     Planned CPT's?  PT EVAL MOD COMPLELITY: 15982;PT RE-EVAL: 67186;PT THER PROC EA 15 MIN: 33741;PT THER ACT EA 15 MIN: 35162;PT MANUAL THERAPY EA 15 MIN: 18786;PT NEUROMUSC RE-EDUCATION EA 15 MIN: 58976;PT HOT OR COLD PACK TREAT MCARE;PT ELECTRICAL STIM UNATTEND: ;PT ELECTRICAL STIM ATTD EA 15 MIN: 54337  -GR     Physical Therapy Interventions (Optional Details)  home exercise program;manual  therapy techniques;modalities;neuromuscular re-education;patient/family education;postural re-education;ROM (Range of Motion);strengthening;stretching  -GR     PT Plan Comments  Begin with nustep. Review and perform HEP.  Progress within tolerance.  Add standing strengthening series at barre.  -GR       User Key  (r) = Recorded By, (t) = Taken By, (c) = Cosigned By    Initials Name Provider Type    Bakari Pizano, PT Physical Therapist            Exercises     Row Name 01/14/19 1200             Total Minutes    02273 - PT Therapeutic Exercise Minutes  15  -GR         Exercise 1    Exercise Name 1  PPT  -GR      Cueing 1  Demo  -GR      Sets 1  1  -GR      Reps 1  10  -GR      Time 1  5 sec  -GR         Exercise 2    Exercise Name 2  LTR  -GR      Cueing 2  Demo  -GR      Sets 2  1  -GR      Reps 2  10  -GR         Exercise 3    Exercise Name 3  HS 90/90  -GR      Cueing 3  Demo  -GR      Sets 3  1  -GR      Reps 3  3  -GR      Time 3  20 sec  -GR         Exercise 4    Exercise Name 4  Piriformis stretch cross body  -GR      Cueing 4  Demo  -GR      Sets 4  1  -GR      Reps 4  3  -GR      Time 4  20 sec  -GR         Exercise 5    Exercise Name 5  H/L clam + TA  -GR      Cueing 5  Demo  -GR      Sets 5  1  -GR      Reps 5  10  -GR      Additional Comments  RTB  -GR         Exercise 6    Exercise Name 6  H/L adductor squeeze +TA  -GR      Cueing 6  Demo  -GR      Sets 6  1  -GR      Reps 6  10  -GR      Additional Comments  ball  -GR         Exercise 7    Exercise Name 7  Bridge  -GR      Cueing 7  Demo  -GR      Sets 7  1  -GR      Reps 7  10  -GR        User Key  (r) = Recorded By, (t) = Taken By, (c) = Cosigned By    Initials Name Provider Type    Bakari Pizano, PT Physical Therapist                        Outcome Measure Options: Modifed [place]  Modified Oswestry  Modified Oswestry Score/Comments: 30% disability      Time Calculation:     Therapy Suggested Charges     Code   Minutes Charges    59492  (CPT®) Hc Pt Neuromusc Re Education Ea 15 Min      93866 (CPT®) Hc Pt Ther Proc Ea 15 Min 15 1    07546 (CPT®) Hc Gait Training Ea 15 Min      53590 (CPT®) Hc Pt Therapeutic Act Ea 15 Min      53976 (CPT®) Hc Pt Manual Therapy Ea 15 Min      99302 (CPT®) Hc Pt Ther Massage- Per 15 Min      28511 (CPT®) Hc Pt Iontophoresis Ea 15 Min      31789 (CPT®) Hc Pt Elec Stim Ea-Per 15 Min      43664 (CPT®) Hc Pt Ultrasound Ea 15 Min      77854 (CPT®) Hc Pt Self Care/Mgmt/Train Ea 15 Min      39925 (CPT®) Hc Pt Prosthetic (S) Train Initial Encounter, Each 15 Min      44934 (CPT®) Hc Orthotic(S) Mgmt/Train Initial Encounter, Each 15min      26796 (CPT®) Hc Pt Aquatic Therapy Ea 15 Min      17211 (CPT®) Hc Pt Orthotic(S)/Prosthetic(S) Encounter, Each 15 Min       (CPT®) Hc Pt Electrical Stim Unattended      Total  15 1          Start Time: 1200  Stop Time: 1240  Time Calculation (min): 40 min  Total Timed Code Minutes- PT: 15 minute(s)     Therapy Charges for Today     Code Description Service Date Service Provider Modifiers Qty    45171529249 HC PT THER PROC EA 15 MIN 1/14/2019 Bakari Velazquez, PT GP 1    62173303037 HC PT EVAL MOD COMPLEXITY 2 1/14/2019 Bakari Velazquez, PT GP 1          PT G-Codes  Outcome Measure Options: Modifed [place]  Modified Oswestry Score/Comments: 30% disability         Bakari Velazquez, PT  1/14/2019

## 2019-01-18 ENCOUNTER — HOSPITAL ENCOUNTER (OUTPATIENT)
Dept: CT IMAGING | Facility: HOSPITAL | Age: 69
Discharge: HOME | End: 2019-01-18
Admitting: INTERNAL MEDICINE

## 2019-01-18 DIAGNOSIS — R59.0 HILAR LYMPHADENOPATHY: ICD-10-CM

## 2019-01-18 PROCEDURE — 71250 CT THORAX DX C-: CPT

## 2019-01-23 ENCOUNTER — HOSPITAL ENCOUNTER (OUTPATIENT)
Dept: PHYSICAL THERAPY | Facility: HOSPITAL | Age: 69
Setting detail: THERAPIES SERIES
Discharge: HOME | End: 2019-01-23

## 2019-01-23 DIAGNOSIS — M54.50 ACUTE BILATERAL LOW BACK PAIN WITHOUT SCIATICA: Primary | ICD-10-CM

## 2019-01-23 PROCEDURE — 97110 THERAPEUTIC EXERCISES: CPT | Performed by: PHYSICAL THERAPIST

## 2019-01-23 NOTE — THERAPY TREATMENT NOTE
Outpatient Physical Therapy Ortho Treatment Note  [facility]     Patient Name: Sachin Almanzar  : 1950  MRN: [id number]  Today's Date: 2019      Visit Date: 2019    Visit Dx:    ICD-10-CM ICD-9-CM   1. Acute bilateral low back pain without sciatica M54.5 724.2     338.19       Patient Active Problem List   Diagnosis   • Allergic rhinitis   • Basal cell carcinoma   • Bladder cancer (CMS/HCC)   • Erectile dysfunction   • Gastroesophageal reflux disease   • Mike's disease   • Hyperlipidemia   • Hypertension   • Melanoma in situ of face (CMS/HCC)   • Nephrolithiasis   • Obesity (BMI 30-39.9)   • Obstructive sleep apnea on CPAP   • SVT (supraventricular tachycardia) (CMS/HCC)   • Venous insufficiency of both lower extremities   • Allergy to shellfish   • History of colon polyps   • Elevated serum glucose   • Multiple renal cysts   • Syncope   • Closed displaced fracture of lateral end of right clavicle   • Glaucoma   • Abnormal kidney function   • Stage 3 chronic kidney disease (CMS/HCC)        Past Medical History:   Diagnosis Date   • ARF (acute renal failure) (CMS/HCC) 2018   • Basal cell carcinoma of skin 7/10/2012    Overview:  NOSE   • Gastric ulcer    • GERD (gastroesophageal reflux disease)    • Hyperlipidemia    • Hypertension    • Kidney stones    • Malignant neoplasm of urinary bladder (CMS/HCC) 7/10/2012    Overview:  3/2011, Dr. Muniz, TURBT, BCG and infusion of chemo   • Melanoma in situ of face (CMS/HCC) 2016    Overview:  Removed per Dr. Steen (derm) on 2016   • Sleep apnea with use of continuous positive airway pressure (CPAP)    • SVT (supraventricular tachycardia) (CMS/HCC)         Past Surgical History:   Procedure Laterality Date   • APPENDECTOMY     • BLADDER TUMOR EXCISION     • CLAVICLE OPEN REDUCTION INTERNAL FIXATION Right 2018    Procedure: CLAVICLE OPEN REDUCTION INTERNAL FIXATION;  Surgeon: Cuate Elias MD;  Location: [facility] OR;   Service: Orthopedics   • COLONOSCOPY N/A 12/12/2017    Procedure: COLONOSCOPY INTO CECUM WITH COLD BX POLYPECTOMY ;  Surgeon: Domingo Cantrell MD;  Location: [facility] ENDOSCOPY;  Service:    • COSMETIC SURGERY      Nose - melanoma removal   • MOHS SURGERY      2 bcc   • TONSILLECTOMY  1951                       PT Assessment/Plan     Row Name 01/23/19 0921          PT Assessment    Assessment Comments  Patient returns for 1st follow up.  He demonstrates good return of HEP with mild cueing required. He remains restricted with HS and piriformis stretch and this is expected to persist for quite some time.  -GR        PT Plan    PT Plan Comments  Continue POC.  -GR       User Key  (r) = Recorded By, (t) = Taken By, (c) = Cosigned By    Initials Name Provider Type    GR Bakari Velazquez, PT Physical Therapist              Exercises     Row Name 01/23/19 0900             Subjective Comments    Subjective Comments  States  the exercises are helping to loosen him up.  -GR         Subjective Pain    Able to rate subjective pain?  yes  -GR      Pre-Treatment Pain Level  1  -GR      Post-Treatment Pain Level  1  -GR      Subjective Pain Comment  dull awareness  -GR         Total Minutes    94538 - PT Therapeutic Exercise Minutes  45  -GR         Exercise 1    Exercise Name 1  PPT  -GR      Cueing 1  Demo  -GR      Sets 1  2  -GR      Reps 1  10  -GR      Time 1  5 sec  -GR         Exercise 2    Exercise Name 2  LTR  -GR      Cueing 2  Demo  -GR      Sets 2  2  -GR      Reps 2  10  -GR         Exercise 3    Exercise Name 3  HS 90/90  -GR      Cueing 3  Demo  -GR      Sets 3  1  -GR      Reps 3  3  -GR      Time 3  20 sec  -GR         Exercise 4    Exercise Name 4  Piriformis stretch cross body  -GR      Cueing 4  Demo  -GR      Sets 4  1  -GR      Reps 4  3  -GR      Time 4  20 sec  -GR         Exercise 5    Exercise Name 5  H/L clam + TA  -GR      Cueing 5  Demo  -GR      Sets 5  2  -GR      Reps 5  10  -GR      Additional  Comments  RTB  -GR         Exercise 6    Exercise Name 6  H/L adductor squeeze +TA  -GR      Cueing 6  Demo  -GR      Sets 6  2  -GR      Reps 6  10  -GR      Additional Comments  ball  -GR         Exercise 7    Exercise Name 7  Bridge  -GR      Cueing 7  Demo  -GR      Sets 7  2  -GR      Reps 7  10  -GR         Exercise 8    Exercise Name 8  Nustep UE/LE  -GR      Time 8  5 minutes  -GR      Additional Comments  L3  -GR         Exercise 9    Exercise Name 9  SKTC  -GR      Cueing 9  Demo  -GR      Sets 9  1  -GR      Reps 9  10  -GR      Time 9  5 sec  -GR      Additional Comments  each side  -GR         Exercise 10    Exercise Name 10  Squat at barre  -GR      Cueing 10  Demo;Verbal  -GR      Sets 10  1  -GR      Reps 10  15  -GR      Additional Comments  copious cueing  -GR        User Key  (r) = Recorded By, (t) = Taken By, (c) = Cosigned By    Initials Name Provider Type    GR Bakari Velazquez, PT Physical Therapist                         PT OP Goals     Row Name 01/23/19 0900          PT Short Term Goals    STG Date to Achieve  01/29/19  -GR     STG 1  Patient will be independent with initial HEP.  -GR     STG 1 Progress  Ongoing  -GR     STG 2  Patient will report 30 minutes of exercise 2-3 days/week for optimal injury prevention.  -GR     STG 2 Progress  Ongoing  -GR        Long Term Goals    LTG Date to Achieve  02/28/19  -GR     LTG 1  Patient will score </=20% disability on the modified TERESSA to indicate improved perceived ADL performance.  -GR     LTG 1 Progress  Ongoing  -GR     LTG 2  Patient will demonstrate safe lifting/body mechanics for light to medium weight objects for spine preservation.  -GR     LTG 2 Progress  Ongoing  -GR     LTG 3  Patient will be independent with progressive HEP for long term management of current condition.  -GR     LTG 3 Progress  Ongoing  -GR     LTG 4  Patient will demonstrate improved hamstring length from severely to moderately limited on the R to ease transitional  movements.  -GR     LTG 4 Progress  Ongoing  -GR       User Key  (r) = Recorded By, (t) = Taken By, (c) = Cosigned By    Initials Name Provider Type    Bakari Pizano, PT Physical Therapist          Therapy Education  Education Details: review of HEP              Time Calculation:   Start Time: 0900  Stop Time: 0945  Time Calculation (min): 45 min  Total Timed Code Minutes- PT: 45 minute(s)  Therapy Suggested Charges     Code   Minutes Charges    16657 (CPT®) Hc Pt Neuromusc Re Education Ea 15 Min      96541 (CPT®) Hc Pt Ther Proc Ea 15 Min 45 3    92929 (CPT®) Hc Gait Training Ea 15 Min      34007 (CPT®) Hc Pt Therapeutic Act Ea 15 Min      50160 (CPT®) Hc Pt Manual Therapy Ea 15 Min      42506 (CPT®) Hc Pt Ther Massage- Per 15 Min      17229 (CPT®) Hc Pt Iontophoresis Ea 15 Min      43711 (CPT®) Hc Pt Elec Stim Ea-Per 15 Min      46052 (CPT®) Hc Pt Ultrasound Ea 15 Min      94162 (CPT®) Hc Pt Self Care/Mgmt/Train Ea 15 Min      42193 (CPT®) Hc Pt Prosthetic (S) Train Initial Encounter, Each 15 Min      71282 (CPT®) Hc Orthotic(S) Mgmt/Train Initial Encounter, Each 15min      61035 (CPT®) Hc Pt Aquatic Therapy Ea 15 Min      24570 (CPT®) Hc Pt Orthotic(S)/Prosthetic(S) Encounter, Each 15 Min       (CPT®) Hc Pt Electrical Stim Unattended      Total  45 3        Therapy Charges for Today     Code Description Service Date Service Provider Modifiers Qty    87855840486 HC PT THER PROC EA 15 MIN 1/23/2019 Bakari Velazquez, PT GP 3                    Bakari Velazquez, PT  1/23/2019

## 2019-01-29 DIAGNOSIS — N28.9 ABNORMAL KIDNEY FUNCTION: ICD-10-CM

## 2019-01-29 DIAGNOSIS — R79.89 ABNORMAL CBC: Primary | ICD-10-CM

## 2019-01-31 LAB
BASOPHILS # BLD AUTO: 0 X10E3/UL (ref 0–0.2)
BASOPHILS NFR BLD AUTO: 0 %
EOSINOPHIL # BLD AUTO: 0.3 X10E3/UL (ref 0–0.4)
EOSINOPHIL NFR BLD AUTO: 5 %
ERYTHROCYTE [DISTWIDTH] IN BLOOD BY AUTOMATED COUNT: 14 % (ref 12.3–15.4)
HCT VFR BLD AUTO: 38.6 % (ref 37.5–51)
HGB BLD-MCNC: 13.2 G/DL (ref 13–17.7)
IMM GRANULOCYTES # BLD AUTO: 0 X10E3/UL (ref 0–0.1)
IMM GRANULOCYTES NFR BLD AUTO: 0 %
LYMPHOCYTES # BLD AUTO: 0.8 X10E3/UL (ref 0.7–3.1)
LYMPHOCYTES NFR BLD AUTO: 15 %
MCH RBC QN AUTO: 32.1 PG (ref 26.6–33)
MCHC RBC AUTO-ENTMCNC: 34.2 G/DL (ref 31.5–35.7)
MCV RBC AUTO: 94 FL (ref 79–97)
MONOCYTES # BLD AUTO: 0.8 X10E3/UL (ref 0.1–0.9)
MONOCYTES NFR BLD AUTO: 15 %
NEUTROPHILS # BLD AUTO: 3.3 X10E3/UL (ref 1.4–7)
NEUTROPHILS NFR BLD AUTO: 65 %
PLATELET # BLD AUTO: 201 X10E3/UL (ref 150–379)
RBC # BLD AUTO: 4.11 X10E6/UL (ref 4.14–5.8)
WBC # BLD AUTO: 5.2 X10E3/UL (ref 3.4–10.8)

## 2019-02-01 ENCOUNTER — HOSPITAL ENCOUNTER (OUTPATIENT)
Dept: PHYSICAL THERAPY | Facility: HOSPITAL | Age: 69
Setting detail: THERAPIES SERIES
Discharge: HOME | End: 2019-02-01

## 2019-02-01 DIAGNOSIS — M54.50 ACUTE BILATERAL LOW BACK PAIN WITHOUT SCIATICA: Primary | ICD-10-CM

## 2019-02-01 PROCEDURE — 97110 THERAPEUTIC EXERCISES: CPT | Performed by: PHYSICAL THERAPIST

## 2019-02-01 NOTE — THERAPY TREATMENT NOTE
Outpatient Physical Therapy Ortho Treatment Note  [facility]     Patient Name: Sachin Almanzar  : 1950  MRN: [id number]  Today's Date: 2019      Visit Date: 2019    Visit Dx:    ICD-10-CM ICD-9-CM   1. Acute bilateral low back pain without sciatica M54.5 724.2     338.19       Patient Active Problem List   Diagnosis   • Allergic rhinitis   • Basal cell carcinoma   • Bladder cancer (CMS/HCC)   • Erectile dysfunction   • Gastroesophageal reflux disease   • Mike's disease   • Hyperlipidemia   • Hypertension   • Melanoma in situ of face (CMS/HCC)   • Nephrolithiasis   • Obesity (BMI 30-39.9)   • Obstructive sleep apnea on CPAP   • SVT (supraventricular tachycardia) (CMS/HCC)   • Venous insufficiency of both lower extremities   • Allergy to shellfish   • History of colon polyps   • Elevated serum glucose   • Multiple renal cysts   • Syncope   • Closed displaced fracture of lateral end of right clavicle   • Glaucoma   • Abnormal kidney function   • Stage 3 chronic kidney disease (CMS/HCC)        Past Medical History:   Diagnosis Date   • ARF (acute renal failure) (CMS/HCC) 2018   • Basal cell carcinoma of skin 7/10/2012    Overview:  NOSE   • Gastric ulcer    • GERD (gastroesophageal reflux disease)    • Hyperlipidemia    • Hypertension    • Kidney stones    • Malignant neoplasm of urinary bladder (CMS/HCC) 7/10/2012    Overview:  3/2011, Dr. Muniz, TURBT, BCG and infusion of chemo   • Melanoma in situ of face (CMS/HCC) 2016    Overview:  Removed per Dr. Steen (derm) on 2016   • Sleep apnea with use of continuous positive airway pressure (CPAP)    • SVT (supraventricular tachycardia) (CMS/HCC)         Past Surgical History:   Procedure Laterality Date   • APPENDECTOMY     • BLADDER TUMOR EXCISION     • CLAVICLE OPEN REDUCTION INTERNAL FIXATION Right 2018    Procedure: CLAVICLE OPEN REDUCTION INTERNAL FIXATION;  Surgeon: Cuate Elias MD;  Location: [facility] OR;   Service: Orthopedics   • COLONOSCOPY N/A 12/12/2017    Procedure: COLONOSCOPY INTO CECUM WITH COLD BX POLYPECTOMY ;  Surgeon: Domingo Cantrell MD;  Location: [facility] ENDOSCOPY;  Service:    • COSMETIC SURGERY      Nose - melanoma removal   • MOHS SURGERY      2 bcc   • TONSILLECTOMY  1951                       PT Assessment/Plan     Row Name 02/01/19 0947          PT Assessment    Assessment Comments  STGs met. Pain reducing.  Continues to require cues for TA activation in supine and standing but pain remains well controlled throughout. Updated HEP and patient was receptive.  -GR        PT Plan    PT Plan Comments  Continue x 1 visit then d/c to I HEP.  -GR       User Key  (r) = Recorded By, (t) = Taken By, (c) = Cosigned By    Initials Name Provider Type    GR Bakari Velazquez, PT Physical Therapist              Exercises     Row Name 02/01/19 0800             Subjective Comments    Subjective Comments  Continues to improve. No complaints with increase to GTB.  -GR         Subjective Pain    Able to rate subjective pain?  yes  -GR      Pre-Treatment Pain Level  0  -GR      Post-Treatment Pain Level  0  -GR         Total Minutes    06606 - PT Therapeutic Exercise Minutes  45  -GR         Exercise 1    Exercise Name 1  PPT  -GR      Cueing 1  Demo  -GR      Sets 1  2  -GR      Reps 1  10  -GR      Time 1  5 sec  -GR         Exercise 2    Exercise Name 2  LTR  -GR      Cueing 2  Demo  -GR      Sets 2  2  -GR      Reps 2  10  -GR      Additional Comments  swiss ball  -GR         Exercise 3    Exercise Name 3  HS 90/90  -GR      Cueing 3  Demo  -GR      Sets 3  1  -GR      Reps 3  3  -GR      Time 3  20 sec  -GR      Additional Comments  added 3 reps with belt  -GR         Exercise 4    Exercise Name 4  Piriformis stretch cross body  -GR      Cueing 4  Demo  -GR      Sets 4  1  -GR      Reps 4  3  -GR      Time 4  20 sec  -GR         Exercise 5    Exercise Name 5  H/L clam + TA  -GR      Cueing 5  Demo  -GR      Sets 5   2  -GR      Reps 5  10  -GR      Additional Comments  GTB  -GR         Exercise 6    Exercise Name 6  H/L adductor squeeze +TA  -GR      Cueing 6  Demo  -GR      Sets 6  2  -GR      Reps 6  10  -GR      Additional Comments  ball  -GR         Exercise 7    Exercise Name 7  Bridge  -GR      Cueing 7  Demo  -GR      Sets 7  2  -GR      Reps 7  10  -GR      Additional Comments  with adduction ball squeeze  -GR         Exercise 8    Exercise Name 8  Nustep UE/LE  -GR      Time 8  5 minutes  -GR      Additional Comments  L5  -GR         Exercise 9    Exercise Name 9  DKTC with swiss ball  -GR      Cueing 9  Demo  -GR      Sets 9  2  -GR      Reps 9  10  -GR         Exercise 10    Exercise Name 10  Squat at barre  -GR      Cueing 10  Demo;Verbal  -GR      Sets 10  1  -GR      Reps 10  15  -GR         Exercise 11    Exercise Name 11  Rows + TA  -GR      Cueing 11  Demo  -GR      Sets 11  2  -GR      Reps 11  10  -GR      Additional Comments  RTB  -GR        User Key  (r) = Recorded By, (t) = Taken By, (c) = Cosigned By    Initials Name Provider Type    GR Bakari Velazquez, PT Physical Therapist                         PT OP Goals     Row Name 02/01/19 0900          PT Short Term Goals    STG Date to Achieve  01/29/19  -GR     STG 1  Patient will be independent with initial HEP.  -GR     STG 1 Progress  Met  -GR     STG 2  Patient will report 30 minutes of exercise 2-3 days/week for optimal injury prevention.  -GR     STG 2 Progress  Met  -GR        Long Term Goals    LTG Date to Achieve  02/28/19  -GR     LTG 1  Patient will score </=20% disability on the modified TERESSA to indicate improved perceived ADL performance.  -GR     LTG 1 Progress  Ongoing  -GR     LTG 2  Patient will demonstrate safe lifting/body mechanics for light to medium weight objects for spine preservation.  -GR     LTG 2 Progress  Ongoing  -GR     LTG 3  Patient will be independent with progressive HEP for long term management of current condition.  -GR      LTG 3 Progress  Ongoing  -GR     LTG 4  Patient will demonstrate improved hamstring length from severely to moderately limited on the R to ease transitional movements.  -GR     LTG 4 Progress  Ongoing  -GR       User Key  (r) = Recorded By, (t) = Taken By, (c) = Cosigned By    Initials Name Provider Type    Bakari Pizano, PT Physical Therapist          Therapy Education  Education Details: udpated HEP and issued handout              Time Calculation:   Start Time: 0845  Stop Time: 0930  Time Calculation (min): 45 min  Total Timed Code Minutes- PT: 45 minute(s)  Therapy Suggested Charges     Code   Minutes Charges    28427 (CPT®) Hc Pt Neuromusc Re Education Ea 15 Min      92905 (CPT®) Hc Pt Ther Proc Ea 15 Min 45 3    40998 (CPT®) Hc Gait Training Ea 15 Min      88461 (CPT®) Hc Pt Therapeutic Act Ea 15 Min      08426 (CPT®) Hc Pt Manual Therapy Ea 15 Min      19114 (CPT®) Hc Pt Ther Massage- Per 15 Min      88145 (CPT®) Hc Pt Iontophoresis Ea 15 Min      91831 (CPT®) Hc Pt Elec Stim Ea-Per 15 Min      36721 (CPT®) Hc Pt Ultrasound Ea 15 Min      16246 (CPT®) Hc Pt Self Care/Mgmt/Train Ea 15 Min      96079 (CPT®) Hc Pt Prosthetic (S) Train Initial Encounter, Each 15 Min      84612 (CPT®) Hc Orthotic(S) Mgmt/Train Initial Encounter, Each 15min      57725 (CPT®) Hc Pt Aquatic Therapy Ea 15 Min      34854 (CPT®) Hc Pt Orthotic(S)/Prosthetic(S) Encounter, Each 15 Min       (CPT®) Hc Pt Electrical Stim Unattended      Total  45 3        Therapy Charges for Today     Code Description Service Date Service Provider Modifiers Qty    50705324623 HC PT THER PROC EA 15 MIN 2/1/2019 Bakari Velazquez, PT GP 3                    Bakari Velazquez, PT  2/1/2019

## 2019-02-06 ENCOUNTER — HOSPITAL ENCOUNTER (OUTPATIENT)
Dept: PHYSICAL THERAPY | Facility: HOSPITAL | Age: 69
Setting detail: THERAPIES SERIES
Discharge: HOME | End: 2019-02-06

## 2019-02-06 DIAGNOSIS — M54.50 ACUTE BILATERAL LOW BACK PAIN WITHOUT SCIATICA: Primary | ICD-10-CM

## 2019-02-06 PROCEDURE — 97110 THERAPEUTIC EXERCISES: CPT | Performed by: PHYSICAL THERAPIST

## 2019-02-15 ENCOUNTER — APPOINTMENT (OUTPATIENT)
Dept: PHYSICAL THERAPY | Facility: HOSPITAL | Age: 69
End: 2019-02-15

## 2019-04-15 RX ORDER — ATORVASTATIN CALCIUM 20 MG/1
TABLET, FILM COATED ORAL
Qty: 90 TABLET | Refills: 1 | Status: SHIPPED | OUTPATIENT
Start: 2019-04-15 | End: 2019-10-30 | Stop reason: SDUPTHER

## 2019-04-23 DIAGNOSIS — I10 ESSENTIAL HYPERTENSION: ICD-10-CM

## 2019-04-23 DIAGNOSIS — N18.30 STAGE 3 CHRONIC KIDNEY DISEASE (HCC): ICD-10-CM

## 2019-04-23 DIAGNOSIS — E78.2 MIXED HYPERLIPIDEMIA: ICD-10-CM

## 2019-04-23 DIAGNOSIS — R73.9 ELEVATED SERUM GLUCOSE: ICD-10-CM

## 2019-04-23 DIAGNOSIS — Z00.00 HEALTH CARE MAINTENANCE: Primary | ICD-10-CM

## 2019-04-23 DIAGNOSIS — Z12.5 SCREENING FOR PROSTATE CANCER: ICD-10-CM

## 2019-05-04 LAB
ALBUMIN SERPL-MCNC: 4.2 G/DL (ref 3.5–5.2)
ALBUMIN/GLOB SERPL: 1.6 G/DL
ALP SERPL-CCNC: 57 U/L (ref 39–117)
ALT SERPL-CCNC: 27 U/L (ref 1–41)
APPEARANCE UR: CLEAR
AST SERPL-CCNC: 22 U/L (ref 1–40)
BACTERIA #/AREA URNS HPF: NORMAL /HPF
BASOPHILS # BLD AUTO: 0.05 10*3/MM3 (ref 0–0.2)
BASOPHILS NFR BLD AUTO: 0.8 % (ref 0–1.5)
BILIRUB SERPL-MCNC: 0.8 MG/DL (ref 0.2–1.2)
BILIRUB UR QL STRIP: NEGATIVE
BUN SERPL-MCNC: 21 MG/DL (ref 8–23)
BUN/CREAT SERPL: 14.8 (ref 7–25)
CALCIUM SERPL-MCNC: 9.9 MG/DL (ref 8.6–10.5)
CHLORIDE SERPL-SCNC: 101 MMOL/L (ref 98–107)
CHOLEST SERPL-MCNC: 115 MG/DL (ref 0–200)
CO2 SERPL-SCNC: 25.2 MMOL/L (ref 22–29)
COLOR UR: YELLOW
CREAT SERPL-MCNC: 1.42 MG/DL (ref 0.76–1.27)
EOSINOPHIL # BLD AUTO: 0.25 10*3/MM3 (ref 0–0.4)
EOSINOPHIL NFR BLD AUTO: 4.2 % (ref 0.3–6.2)
EPI CELLS #/AREA URNS HPF: NORMAL /HPF
ERYTHROCYTE [DISTWIDTH] IN BLOOD BY AUTOMATED COUNT: 13.6 % (ref 12.3–15.4)
GLOBULIN SER CALC-MCNC: 2.7 GM/DL
GLUCOSE SERPL-MCNC: 125 MG/DL (ref 65–99)
GLUCOSE UR QL: NEGATIVE
HBA1C MFR BLD: 5.1 % (ref 4.8–5.6)
HCT VFR BLD AUTO: 38.6 % (ref 37.5–51)
HDLC SERPL-MCNC: 29 MG/DL (ref 40–60)
HGB BLD-MCNC: 13.2 G/DL (ref 13–17.7)
HGB UR QL STRIP: NEGATIVE
IMM GRANULOCYTES # BLD AUTO: 0.03 10*3/MM3 (ref 0–0.05)
IMM GRANULOCYTES NFR BLD AUTO: 0.5 % (ref 0–0.5)
KETONES UR QL STRIP: NEGATIVE
LDLC SERPL CALC-MCNC: 43 MG/DL (ref 0–100)
LEUKOCYTE ESTERASE UR QL STRIP: NEGATIVE
LYMPHOCYTES # BLD AUTO: 0.73 10*3/MM3 (ref 0.7–3.1)
LYMPHOCYTES NFR BLD AUTO: 12.1 % (ref 19.6–45.3)
MCH RBC QN AUTO: 32.7 PG (ref 26.6–33)
MCHC RBC AUTO-ENTMCNC: 34.2 G/DL (ref 31.5–35.7)
MCV RBC AUTO: 95.5 FL (ref 79–97)
MICRO URNS: NORMAL
MICRO URNS: NORMAL
MONOCYTES # BLD AUTO: 0.77 10*3/MM3 (ref 0.1–0.9)
MONOCYTES NFR BLD AUTO: 12.8 % (ref 5–12)
MUCOUS THREADS URNS QL MICRO: PRESENT /HPF
NEUTROPHILS # BLD AUTO: 4.18 10*3/MM3 (ref 1.7–7)
NEUTROPHILS NFR BLD AUTO: 69.6 % (ref 42.7–76)
NITRITE UR QL STRIP: NEGATIVE
NRBC BLD AUTO-RTO: 0 /100 WBC (ref 0–0.2)
PH UR STRIP: 7 [PH] (ref 5–7.5)
PLATELET # BLD AUTO: 210 10*3/MM3 (ref 140–450)
POTASSIUM SERPL-SCNC: 4 MMOL/L (ref 3.5–5.2)
PROT SERPL-MCNC: 6.9 G/DL (ref 6–8.5)
PROT UR QL STRIP: NEGATIVE
PSA SERPL-MCNC: 0.78 NG/ML (ref 0–4)
RBC # BLD AUTO: 4.04 10*6/MM3 (ref 4.14–5.8)
RBC #/AREA URNS HPF: NORMAL /HPF
SODIUM SERPL-SCNC: 138 MMOL/L (ref 136–145)
SP GR UR: 1.02 (ref 1–1.03)
TRIGL SERPL-MCNC: 217 MG/DL (ref 0–150)
TSH SERPL DL<=0.005 MIU/L-ACNC: 1.7 MIU/ML (ref 0.27–4.2)
URINALYSIS REFLEX: NORMAL
UROBILINOGEN UR STRIP-MCNC: 0.2 MG/DL (ref 0.2–1)
VLDLC SERPL CALC-MCNC: 43.4 MG/DL
WBC # BLD AUTO: 6.01 10*3/MM3 (ref 3.4–10.8)
WBC #/AREA URNS HPF: NORMAL /HPF

## 2019-05-09 ENCOUNTER — OFFICE VISIT (OUTPATIENT)
Dept: INTERNAL MEDICINE | Facility: CLINIC | Age: 69
End: 2019-05-09

## 2019-05-09 VITALS
TEMPERATURE: 97.3 F | SYSTOLIC BLOOD PRESSURE: 126 MMHG | WEIGHT: 235 LBS | RESPIRATION RATE: 16 BRPM | DIASTOLIC BLOOD PRESSURE: 68 MMHG | OXYGEN SATURATION: 99 % | HEART RATE: 84 BPM | BODY MASS INDEX: 33.64 KG/M2 | HEIGHT: 70 IN

## 2019-05-09 DIAGNOSIS — N18.30 STAGE 3 CHRONIC KIDNEY DISEASE (HCC): ICD-10-CM

## 2019-05-09 DIAGNOSIS — E78.2 MIXED HYPERLIPIDEMIA: ICD-10-CM

## 2019-05-09 DIAGNOSIS — Z00.00 HEALTH MAINTENANCE EXAMINATION: Primary | ICD-10-CM

## 2019-05-09 DIAGNOSIS — I10 ESSENTIAL HYPERTENSION: ICD-10-CM

## 2019-05-09 DIAGNOSIS — Z00.00 MEDICARE ANNUAL WELLNESS VISIT, SUBSEQUENT: ICD-10-CM

## 2019-05-09 DIAGNOSIS — I87.2 VENOUS INSUFFICIENCY OF BOTH LOWER EXTREMITIES: ICD-10-CM

## 2019-05-09 PROBLEM — N28.9 ABNORMAL KIDNEY FUNCTION: Status: RESOLVED | Noted: 2018-08-23 | Resolved: 2019-05-09

## 2019-05-09 PROCEDURE — G0439 PPPS, SUBSEQ VISIT: HCPCS | Performed by: INTERNAL MEDICINE

## 2019-05-09 PROCEDURE — 99397 PER PM REEVAL EST PAT 65+ YR: CPT | Performed by: INTERNAL MEDICINE

## 2019-05-09 NOTE — PROGRESS NOTES
Medicare Annual Wellness Visit    Chief Complaint:  Annual Exam (AWV & CPE)      History of Present Illness    Sachin Almanzar is a 68 y.o. male who presents for an Annual Wellness Visit. In addition, we addressed the following health issues:    C-scope:12/12/17 due 2022  Prostate exam: Dr. Muniz  Dental Exam: q4mo  Eye Exam: yearly  Exercise: not much.  He is active with moving houses, but no gym time.      Advanced Care Planning:  Patient has an advance directive - a copy has not been provided. Have asked the patient to send this to us to add to record   Immunization History   Administered Date(s) Administered   • Flu Vaccine High Dose PF 65YR+ 10/02/2015, 09/23/2016, 10/12/2017, 09/28/2018   • Pneumococcal Conjugate 13-Valent (PCV13) 07/06/2016   • Pneumococcal Polysaccharide (PPSV23) 08/28/2013   • Td, Not Adsorbed 01/01/2002   • Tdap 02/25/2013   • Zostavax 12/18/2009     He has noticed that his legs are swelling more in the last week or so.  It goes away if he elevated his feet.  His legs and ankles don't feel tight.  He has probably had more salt lately than he really realized.  He ate [place]amese food last night.  They are in the process of packing up and getting ready to move.  He denies any cp or palp or soa.      Review of Systems   Constitution: Negative for chills, fever and malaise/fatigue.   HENT: Negative for hearing loss, hoarse voice and sore throat.    Eyes: Negative for blurred vision, double vision and visual disturbance.   Cardiovascular: Positive for leg swelling. Negative for chest pain and palpitations.   Respiratory: Negative for cough and shortness of breath.    Endocrine: Negative for polydipsia and polyuria.   Hematologic/Lymphatic: Does not bruise/bleed easily.   Skin: Negative for rash and suspicious lesions.   Musculoskeletal: Negative for arthritis and myalgias.   Gastrointestinal: Positive for constipation (occ). Negative for bloating, abdominal pain, change in bowel habit, diarrhea,  dysphagia, hematochezia, melena, nausea and vomiting.   Genitourinary: Negative for dysuria and hematuria.   Neurological: Negative for dizziness, headaches and light-headedness.   Psychiatric/Behavioral: Negative for depression. The patient is not nervous/anxious.        Past Medical History:   Diagnosis Date   • ARF (acute renal failure) (CMS/Formerly [facility]) 7/18/2018   • Basal cell carcinoma 7/10/2012    Overview:  NOSE  Overview:  NOSE   • Basal cell carcinoma of skin 7/10/2012    Overview:  NOSE   • Bladder cancer (CMS/Formerly [facility]) 7/10/2012    Overview:  3/2011, Dr. Muniz, TURBT, BCG and infusion of chemo  Overview:  3/2011, Dr. Muniz, TURBT, BCG and infusion of chemo   • Gastric ulcer    • GERD (gastroesophageal reflux disease)    • [place]'s disease 7/10/2012    Overview:  Transient acantholytic dermatosis (trunk)  Overview:  Transient acantholytic dermatosis (trunk)   • Hyperlipidemia    • Hypertension    • Kidney stones    • Malignant neoplasm of urinary bladder (CMS/[facility]ly [facility]) 7/10/2012    Overview:  3/2011, Dr. Muniz, TURBT, BCG and infusion of chemo   • Melanoma in situ of face (CMS/Formerly [facility]) 6/14/2016    Overview:  Removed per Dr. Steen (derm) on 6/13/2016   • Sleep apnea with use of continuous positive airway pressure (CPAP)    • SVT (supraventricular tachycardia) (CMS/Formerly [facility])        Past Surgical History:   Procedure Laterality Date   • APPENDECTOMY  1954   • BLADDER TUMOR EXCISION     • CLAVICLE OPEN REDUCTION INTERNAL FIXATION Right 7/20/2018    Procedure: CLAVICLE OPEN REDUCTION INTERNAL FIXATION;  Surgeon: Cuate Elias MD;  Location: [facility] MAIN OR;  Service: Orthopedics   • COLONOSCOPY N/A 12/12/2017    Procedure: COLONOSCOPY INTO CECUM WITH COLD BX POLYPECTOMY ;  Surgeon: Domingo Cantrell MD;  Location: [facility] ENDOSCOPY;  Service:    • COSMETIC SURGERY      Nose - melanoma removal   • MOHS SURGERY      2 bcc   • TONSILLECTOMY  1951       Social History     Socioeconomic History   • Marital status:      Spouse name:  Julita   • Number of children: 1   • Years of education: Not on file   • Highest education level: Not on file   Occupational History   • Occupation: retired president of [employer]   Tobacco Use   • Smoking status: Never Smoker   • Smokeless tobacco: Never Used   Substance and Sexual Activity   • Alcohol use: Yes     Alcohol/week: 1.2 - 1.8 oz     Types: 2 - 3 Cans of beer per week     Comment: 2-3 beers 5 days a week   • Drug use: No   • Sexual activity: Defer       Family History   Problem Relation Age of Onset   • Heart attack Mother          age 55   • Cancer Father          at age 44   • Diabetes Maternal Grandmother    • Hypertension Paternal Uncle        Allergies   Allergen Reactions   • Ace Inhibitors    • Shellfish-Derived Products Hives     Throat swelling, itching       Current Outpatient Medications on File Prior to Visit   Medication Sig Dispense Refill   • aspirin 81 MG EC tablet Take 81 mg by mouth Daily.     • atorvastatin (LIPITOR) 20 MG tablet TAKE 1 TABLET EVERY NIGHT 90 tablet 1   • carvedilol (COREG) 6.25 MG tablet TAKE 1 TABLET BY MOUTH EVERY 12 HOURS 60 tablet 5   • EPINEPHrine (EPIPEN) 0.3 MG/0.3ML solution auto-injector injection      • fexofenadine (ALLEGRA) 180 MG tablet Take 180 mg by mouth Daily.     • NIFEdipine CC (ADALAT CC) 60 MG 24 hr tablet TAKE 1 TABLET BY MOUTH DAILY 30 tablet 5   • raNITIdine (ZANTAC) 300 MG tablet Take 300 mg by mouth Every Night.     • sildenafil (VIAGRA) 100 MG tablet Take 100 mg by mouth As Needed for erectile dysfunction.     • [DISCONTINUED] melatonin 5 MG tablet tablet Take 5 mg by mouth At Night As Needed (insommnia).     • [DISCONTINUED] cyclobenzaprine (FLEXERIL) 10 MG tablet Take 1 tablet by mouth 3 (Three) Times a Day As Needed for Muscle Spasms. 12 tablet 0   • [DISCONTINUED] MethylPREDNISolone (MEDROL) 4 MG tablet follow package directions 21 tablet 0     No current facility-administered medications on file prior to visit.        Patient Active  "Problem List   Diagnosis   • Allergic rhinitis   • Erectile dysfunction   • Gastroesophageal reflux disease   • Hyperlipidemia   • Hypertension   • Nephrolithiasis   • Obesity (BMI 30-39.9)   • Obstructive sleep apnea on CPAP   • Venous insufficiency of both lower extremities   • Allergy to shellfish   • History of colon polyps   • Elevated serum glucose   • Multiple renal cysts   • Syncope   • Closed displaced fracture of lateral end of right clavicle   • Glaucoma   • Stage 3 chronic kidney disease (CMS/HCC)       Health Risk Assessment/Depression Screen/Functional and Cognitive Screening:   The patient has completed a Health Risk Assessment & Depression screen. These have been reviewed with them and have been scanned as a separate documents.    Age-appropriate Screening Schedule:  Refer to the list below for future screening recommendations based on patient's age. Orders for these recommended tests are listed in the plan section. The patient has been provided with a written plan.     I have reviewed their problem list, past medical history, family history, social history, and surgical history.     Vitals:    05/09/19 1453   BP: 126/68   Pulse: 84   Resp: 16   Temp: 97.3 °F (36.3 °C)   SpO2: 99%   Weight: 107 kg (235 lb)   Height: 177.8 cm (70\")   PainSc: 0-No pain       Body mass index is 33.72 kg/m².    Physical Exam   Constitutional: He is oriented to person, place, and time. He appears well-developed and well-nourished. No distress.   HENT:   Head: Normocephalic and atraumatic.   Right Ear: Hearing and external ear normal.   Left Ear: Hearing and external ear normal.   Nose: Nose normal.   Mouth/Throat: Oropharynx is clear and moist and mucous membranes are normal.   Eyes: Conjunctivae, EOM and lids are normal. Pupils are equal, round, and reactive to light. No scleral icterus.   Neck: Trachea normal and normal range of motion. Neck supple.   Cardiovascular: Normal rate, regular rhythm and normal heart sounds. " Exam reveals no gallop and no friction rub.   No murmur heard.  Pulses:       Carotid pulses are 2+ on the right side, and 2+ on the left side.       Femoral pulses are 2+ on the right side, and 2+ on the left side.       Dorsalis pedis pulses are 2+ on the right side, and 2+ on the left side.        Posterior tibial pulses are 2+ on the right side, and 2+ on the left side.   Pulmonary/Chest: Effort normal and breath sounds normal. No respiratory distress. He has no wheezes. He has no rales.   Abdominal: Soft. Normal appearance and bowel sounds are normal. He exhibits no distension and no mass. There is no tenderness.   Musculoskeletal: Normal range of motion. He exhibits edema (1+ ble).     Vascular Status -  His right foot exhibits no edema. His left foot exhibits no edema.  Skin Integrity  -  His right foot skin is intact.His left foot skin is intact..  Lymphadenopathy:     He has no cervical adenopathy.        Right: No inguinal and no supraclavicular adenopathy present.        Left: No inguinal and no supraclavicular adenopathy present.   Neurological: He is alert and oriented to person, place, and time. He has normal strength. No cranial nerve deficit. He exhibits normal muscle tone. Coordination and gait normal.   Skin: Skin is warm and dry. No rash noted.   Psychiatric: He has a normal mood and affect. His speech is normal and behavior is normal. Judgment and thought content normal. Cognition and memory are normal.   Vitals reviewed.      Results for orders placed or performed in visit on 04/23/19   Comprehensive Metabolic Panel   Result Value Ref Range    Glucose 125 (H) 65 - 99 mg/dL    BUN 21 8 - 23 mg/dL    Creatinine 1.42 (H) 0.76 - 1.27 mg/dL    eGFR Non African Am 49 (L) >60 mL/min/1.73    eGFR African Am 60 (L) >60 mL/min/1.73    BUN/Creatinine Ratio 14.8 7.0 - 25.0    Sodium 138 136 - 145 mmol/L    Potassium 4.0 3.5 - 5.2 mmol/L    Chloride 101 98 - 107 mmol/L    Total CO2 25.2 22.0 - 29.0 mmol/L     Calcium 9.9 8.6 - 10.5 mg/dL    Total Protein 6.9 6.0 - 8.5 g/dL    Albumin 4.20 3.50 - 5.20 g/dL    Globulin 2.7 gm/dL    A/G Ratio 1.6 g/dL    Total Bilirubin 0.8 0.2 - 1.2 mg/dL    Alkaline Phosphatase 57 39 - 117 U/L    AST (SGOT) 22 1 - 40 U/L    ALT (SGPT) 27 1 - 41 U/L   Lipid Panel   Result Value Ref Range    Total Cholesterol 115 0 - 200 mg/dL    Triglycerides 217 (H) 0 - 150 mg/dL    HDL Cholesterol 29 (L) 40 - 60 mg/dL    VLDL Cholesterol 43.4 mg/dL    LDL Cholesterol  43 0 - 100 mg/dL   TSH Rfx On Abnormal To Free T4   Result Value Ref Range    TSH 1.700 0.270 - 4.200 mIU/mL   Hemoglobin A1c   Result Value Ref Range    Hemoglobin A1C 5.10 4.80 - 5.60 %   Urinalysis With Culture If Indicated - Urine, Clean Catch   Result Value Ref Range    Specific Gravity, UA 1.016 1.005 - 1.030    pH, UA 7.0 5.0 - 7.5    Color, UA Yellow Yellow    Appearance, UA Clear Clear    Leukocytes, UA Negative Negative    Protein Negative Negative/Trace    Glucose, UA Negative Negative    Ketones Negative Negative    Blood, UA Negative Negative    Bilirubin, UA Negative Negative    Urobilinogen, UA 0.2 0.2 - 1.0 mg/dL    Nitrite, UA Negative Negative    Microscopic Examination Comment     Microscopic Examination See below:     Urinalysis Reflex Comment    PSA DIAGNOSTIC   Result Value Ref Range    PSA 0.782 0.000 - 4.000 ng/mL   Microscopic Examination -   Result Value Ref Range    WBC, UA 0-5 0 - 5 /hpf    RBC, UA 0-2 0 - 2 /hpf    Epithelial Cells (non renal) None seen 0 - 10 /hpf    Mucus, UA Present Not Estab. /hpf    Bacteria, UA None seen None seen/Few /hpf   CBC & Differential   Result Value Ref Range    WBC 6.01 3.40 - 10.80 10*3/mm3    RBC 4.04 (L) 4.14 - 5.80 10*6/mm3    Hemoglobin 13.2 13.0 - 17.7 g/dL    Hematocrit 38.6 37.5 - 51.0 %    MCV 95.5 79.0 - 97.0 fL    MCH 32.7 26.6 - 33.0 pg    MCHC 34.2 31.5 - 35.7 g/dL    RDW 13.6 12.3 - 15.4 %    Platelets 210 140 - 450 10*3/mm3    Neutrophil Rel % 69.6 42.7 - 76.0 %     Lymphocyte Rel % 12.1 (L) 19.6 - 45.3 %    Monocyte Rel % 12.8 (H) 5.0 - 12.0 %    Eosinophil Rel % 4.2 0.3 - 6.2 %    Basophil Rel % 0.8 0.0 - 1.5 %    Neutrophils Absolute 4.18 1.70 - 7.00 10*3/mm3    Lymphocytes Absolute 0.73 0.70 - 3.10 10*3/mm3    Monocytes Absolute 0.77 0.10 - 0.90 10*3/mm3    Eosinophils Absolute 0.25 0.00 - 0.40 10*3/mm3    Basophils Absolute 0.05 0.00 - 0.20 10*3/mm3    Immature Granulocyte Rel % 0.5 0.0 - 0.5 %    Immature Grans Absolute 0.03 0.00 - 0.05 10*3/mm3    nRBC 0.0 0.0 - 0.2 /100 WBC       Assessment & Plan:    Diagnoses and all orders for this visit:    Health maintenance examination    Medicare annual wellness visit, subsequent    Essential hypertension    Mixed hyperlipidemia    Stage 3 chronic kidney disease (CMS/HCC)    Venous insufficiency of both lower extremities        Discussion/Summary  Charles is here for his CPE and AWV.  He is up to date on all health maintenance.  Advised to look into the new shingles vaccine at his local pharmacy.  His bp is controlled.  Lipids are ok.  His TG are elevated but he attributes that to his diet.  He knows he needs to reign this in some.  We discussed the amount of salt in most meals from restaurants.  His creatinine is back up again.  Advised to make more effort to drink water throughout the day.  Continue all current meds.  Follow up in 6 mo.        Follow Up:  No Follow-up on file.     An After Visit Summary and PPPS with all of these plans were given to the patient.

## 2019-05-09 NOTE — PATIENT INSTRUCTIONS
Medicare Wellness  Personal Prevention Plan of Service     Date of Office Visit:  2019  Encounter Provider:  aHli Potts MD  Place of Service:  [facility] INTERNAL MEDICINE  Patient Name: Sachin Almanzar  :  1950    As part of the Medicare Wellness portion of your visit today, we are providing you with this personalized preventive plan of services (PPPS). This plan is based upon recommendations of the [place] Preventive Services Task Force (USPSTF) and the Advisory Committee on Immunization Practices (ACIP).    This lists the preventive care services that should be considered, and provides dates of when you are due. Items listed as completed are up-to-date and do not require any further intervention.    Health Maintenance   Topic Date Due   • ZOSTER VACCINE (2 of 3) 2010   • MEDICARE ANNUAL WELLNESS  2019   • INFLUENZA VACCINE  2019   • LIPID PANEL  2020   • COLONOSCOPY  2022   • TDAP/TD VACCINES (2 - Td) 2023   • HEPATITIS C SCREENING  Completed   • PNEUMOCOCCAL VACCINES (65+ LOW/MEDIUM RISK)  Completed       No orders of the defined types were placed in this encounter.      No Follow-up on file.

## 2019-06-19 RX ORDER — NIFEDIPINE 60 MG/1
60 TABLET, FILM COATED, EXTENDED RELEASE ORAL
Qty: 30 TABLET | Refills: 0 | Status: SHIPPED | OUTPATIENT
Start: 2019-06-19 | End: 2019-07-16 | Stop reason: SDUPTHER

## 2019-06-21 ENCOUNTER — TELEPHONE (OUTPATIENT)
Dept: CARDIOLOGY | Facility: CLINIC | Age: 69
End: 2019-06-21

## 2019-06-21 RX ORDER — CARVEDILOL 6.25 MG/1
TABLET ORAL
Qty: 60 TABLET | Refills: 0 | Status: SHIPPED | OUTPATIENT
Start: 2019-06-21 | End: 2019-07-16 | Stop reason: SDUPTHER

## 2019-06-21 NOTE — TELEPHONE ENCOUNTER
Amelia can you call this patient for an appt within the next month. Needs seen been a while. This is Dr. Short patient  I refilled his medication for 1 month

## 2019-06-24 ENCOUNTER — OFFICE VISIT (OUTPATIENT)
Dept: CARDIOLOGY | Facility: CLINIC | Age: 69
End: 2019-06-24

## 2019-06-24 VITALS
BODY MASS INDEX: 33.61 KG/M2 | HEIGHT: 70 IN | WEIGHT: 234.8 LBS | OXYGEN SATURATION: 99 % | DIASTOLIC BLOOD PRESSURE: 88 MMHG | HEART RATE: 81 BPM | SYSTOLIC BLOOD PRESSURE: 140 MMHG

## 2019-06-24 DIAGNOSIS — R55 SYNCOPE, UNSPECIFIED SYNCOPE TYPE: Primary | ICD-10-CM

## 2019-06-24 DIAGNOSIS — R00.2 PALPITATIONS: ICD-10-CM

## 2019-06-24 PROCEDURE — 93000 ELECTROCARDIOGRAM COMPLETE: CPT | Performed by: NURSE PRACTITIONER

## 2019-06-24 PROCEDURE — 99213 OFFICE O/P EST LOW 20 MIN: CPT | Performed by: NURSE PRACTITIONER

## 2019-06-24 NOTE — PROGRESS NOTES
Date of Office Visit: 2019  Encounter Provider: GÉNESIS Yen  Place of Service: [facility] CARDIOLOGY  Patient Name: Sachin Almanzar  :1950    Chief Complaint   Patient presents with   • Follow-up   :     HPI: Sachin Almanzar is a 68 y.o. male, new to me, who presents today for follow-up.  Old records have been obtained and reviewed by me. He is a patient with a past cardiac history significant for SVT approximately 10 years ago.  In 2018, he presented to the emergency room after a syncopal episode.  This was a johnnie syncopal episode without any prodromal symptoms.  Troponin was negative.  The d-dimer was slightly elevated and a CTA of the chest was negative for pulmonary embolus.  He was seen by Dr. Zaidi for possible cardiac work-up for his syncopal episode.  He had an echocardiogram which revealed normal LV function and no valvular abnormalities.  He had no evidence of ventricular arrhythmia or high-grade AV block.  His blood alcohol level upon ER presentation was 0.12 and he did admit to having a few beers.  No further cardiac work-up was warranted and he was advised to follow-up in 4 weeks.  He was last seen in the office on 2018 at which time he was doing well with no complaints of angina or heart failure.  He did report some fatigue which interestingly enough, had been present for several weeks prior to his syncopal episode.  His PCP failed his kidney function was a little decreased and he stopped taking his NSAIDs.  He does have a strong family history of coronary disease with a mother who had a heart attack at age 55.  On 2018, he underwent a treadmill stress test which revealed no ECG or clinical evidence of ischemia.  He also wore a 14-day Holter monitor which was a normal study.  I am seeing him today for follow-up.    Over the past year he has been overall doing well from a cardiac standpoint.  He denies any chest pain, shortness  "of air, dizziness or lightheadedness, or syncope.  He does have occasional palpitations which he describes as \"skipped beats\" that are associated with no other symptoms.  He states this is been going on for years but he may be notices a little more frequency recently.  He has occasional ankle edema which has also been ongoing for about 4 years.  He doesn't drink an excessive amount of caffeine and he wear a CPAP for sleep apnea.    Past Medical History:   Diagnosis Date   • Basal cell carcinoma 7/10/2012    Overview:  NOSE  Overview:  NOSE   • Basal cell carcinoma of skin 7/10/2012    Overview:  NOSE   • Bladder cancer (CMS/HCC) 7/10/2012    Overview:  3/2011, Dr. Muniz, TURBT, BCG and infusion of chemo  Overview:  3/2011, Dr. Muniz, TURBT, BCG and infusion of chemo   • Chronic kidney disease (CKD), stage II (mild)    • Gastric ulcer    • GERD (gastroesophageal reflux disease)    • [place]'s disease 7/10/2012    Overview:  Transient acantholytic dermatosis (trunk)  Overview:  Transient acantholytic dermatosis (trunk)   • Hyperlipidemia    • Hypertension    • Kidney stones    • Malignant neoplasm of urinary bladder (CMS/HCC) 7/10/2012    Overview:  3/2011, Dr. Muniz, TURBT, BCG and infusion of chemo   • Melanoma in situ of face (CMS/HCC) 6/14/2016    Overview:  Removed per Dr. Steen (derm) on 6/13/2016   • Sleep apnea with use of continuous positive airway pressure (CPAP)    • SVT (supraventricular tachycardia) (CMS/HCC)        Past Surgical History:   Procedure Laterality Date   • APPENDECTOMY  1954   • BLADDER TUMOR EXCISION     • CLAVICLE OPEN REDUCTION INTERNAL FIXATION Right 7/20/2018    Procedure: CLAVICLE OPEN REDUCTION INTERNAL FIXATION;  Surgeon: Cuate Elias MD;  Location: [facility] OR;  Service: Orthopedics   • COLONOSCOPY N/A 12/12/2017    Procedure: COLONOSCOPY INTO CECUM WITH COLD BX POLYPECTOMY ;  Surgeon: Domingo Cantrell MD;  Location: [facility] ENDOSCOPY;  Service:    • COSMETIC SURGERY      Nose " - melanoma removal   • MOHS SURGERY      2 bcc   • TONSILLECTOMY         Social History     Socioeconomic History   • Marital status:      Spouse name: Julita   • Number of children: 1   • Years of education: Not on file   • Highest education level: Not on file   Occupational History   • Occupation: retired president of [employer]   Tobacco Use   • Smoking status: Never Smoker   • Smokeless tobacco: Never Used   Substance and Sexual Activity   • Alcohol use: Yes     Alcohol/week: 1.2 - 1.8 oz     Types: 2 - 3 Cans of beer per week     Comment: 6 pk a week   • Drug use: No   • Sexual activity: Defer       Family History   Problem Relation Age of Onset   • Heart attack Mother          age 55   • Cancer Father          at age 44   • Diabetes Maternal Grandmother    • Hypertension Paternal Uncle        Review of Systems   Constitution: Negative for chills, fever and malaise/fatigue.   Cardiovascular: Positive for leg swelling and palpitations. Negative for chest pain, dyspnea on exertion, near-syncope, orthopnea, paroxysmal nocturnal dyspnea and syncope.   Respiratory: Negative for cough and shortness of breath.    Musculoskeletal: Negative for joint pain, joint swelling and myalgias.   Gastrointestinal: Negative for abdominal pain, diarrhea, melena, nausea and vomiting.   Genitourinary: Negative for frequency and hematuria.   Neurological: Negative for light-headedness, numbness, paresthesias and seizures.   Allergic/Immunologic: Negative.    All other systems reviewed and are negative.      Allergies   Allergen Reactions   • Ace Inhibitors    • Shellfish-Derived Products Hives     Throat swelling, itching         Current Outpatient Medications:   •  aspirin 81 MG EC tablet, Take 81 mg by mouth Daily., Disp: , Rfl:   •  atorvastatin (LIPITOR) 20 MG tablet, TAKE 1 TABLET EVERY NIGHT, Disp: 90 tablet, Rfl: 1  •  carvedilol (COREG) 6.25 MG tablet, TAKE 1 TABLET BY MOUTH EVERY 12 HOURS, Disp: 60 tablet, Rfl:  "0  •  EPINEPHrine (EPIPEN) 0.3 MG/0.3ML solution auto-injector injection, , Disp: , Rfl:   •  fexofenadine (ALLEGRA) 180 MG tablet, Take 180 mg by mouth Daily., Disp: , Rfl:   •  NIFEdipine CC (ADALAT CC) 60 MG 24 hr tablet, TAKE 1 TABLET BY MOUTH DAILY, Disp: 30 tablet, Rfl: 0  •  raNITIdine (ZANTAC) 300 MG tablet, Take 300 mg by mouth Every Night., Disp: , Rfl:   •  sildenafil (VIAGRA) 100 MG tablet, Take 100 mg by mouth As Needed for erectile dysfunction., Disp: , Rfl:       Objective:     Vitals:    06/24/19 1151 06/24/19 1204   BP: 142/90 140/88   BP Location: Right arm Left arm   Pulse: 81    SpO2: 99%    Weight: 107 kg (234 lb 12.8 oz)    Height: 177.8 cm (70\")      Body mass index is 33.69 kg/m².    PHYSICAL EXAM:    Physical Exam   Constitutional: He is oriented to person, place, and time. He appears well-developed and well-nourished. No distress.   HENT:   Head: Normocephalic and atraumatic.   Eyes: Pupils are equal, round, and reactive to light.   Neck: No JVD present. No thyromegaly present.   Cardiovascular: Normal rate, regular rhythm, normal heart sounds and intact distal pulses.   No murmur heard.  Pulmonary/Chest: Effort normal and breath sounds normal. No respiratory distress.   Abdominal: Soft. Bowel sounds are normal. He exhibits no distension. There is no splenomegaly or hepatomegaly. There is no tenderness.   Musculoskeletal: Normal range of motion. He exhibits no edema.   Neurological: He is alert and oriented to person, place, and time.   Skin: Skin is warm and dry. He is not diaphoretic. No erythema.   Psychiatric: He has a normal mood and affect. His behavior is normal. Judgment normal.         ECG 12 Lead  Date/Time: 6/24/2019 12:43 PM  Performed by: Bertha Briseno APRN  Authorized by: Bertha Briseno APRN   Comparison: compared with previous ECG from 7/27/2018  Similar to previous ECG  Rhythm: sinus rhythm  Rate: normal  BPM: 71    Clinical impression: normal ECG  Comments: " Indication: syncope              Assessment:       Diagnosis Plan   1. Syncope, unspecified syncope type  ECG 12 Lead   2. Palpitations       Orders Placed This Encounter   Procedures   • ECG 12 Lead     This order was created via procedure documentation          Plan:       Overall I think he is stable from a cardiac standpoint.  He denies any symptoms of angina or heart failure.  I do not think that his palpitations are of any concern.  They occur randomly and is the absence of other symptoms.  However, I did offer him a Holter monitor.  He is going to think about this and let me know if he decides to proceed with one.  Otherwise, I am not going to make any changes to his medical regimen and he will follow-up with Dr. Zaidi in 1 year or sooner if needed.      As always, it has been a pleasure to participate in your patient's care.      Sincerely,         GÉNESIS Najera

## 2019-07-16 RX ORDER — CARVEDILOL 6.25 MG/1
TABLET ORAL
Qty: 60 TABLET | Refills: 5 | Status: SHIPPED | OUTPATIENT
Start: 2019-07-16 | End: 2019-12-30

## 2019-07-16 RX ORDER — NIFEDIPINE 60 MG/1
60 TABLET, FILM COATED, EXTENDED RELEASE ORAL
Qty: 30 TABLET | Refills: 5 | Status: SHIPPED | OUTPATIENT
Start: 2019-07-16 | End: 2019-12-30

## 2019-08-26 ENCOUNTER — OFFICE VISIT (OUTPATIENT)
Dept: INTERNAL MEDICINE | Facility: CLINIC | Age: 69
End: 2019-08-26

## 2019-08-26 VITALS
SYSTOLIC BLOOD PRESSURE: 132 MMHG | HEART RATE: 72 BPM | BODY MASS INDEX: 33.64 KG/M2 | WEIGHT: 235 LBS | DIASTOLIC BLOOD PRESSURE: 78 MMHG | HEIGHT: 70 IN | OXYGEN SATURATION: 98 %

## 2019-08-26 DIAGNOSIS — M54.50 ACUTE RIGHT-SIDED LOW BACK PAIN WITHOUT SCIATICA: Primary | ICD-10-CM

## 2019-08-26 PROBLEM — Z86.0100 HISTORY OF COLON POLYPS: Status: RESOLVED | Noted: 2017-10-24 | Resolved: 2019-08-26

## 2019-08-26 PROBLEM — S42.031A CLOSED DISPLACED FRACTURE OF LATERAL END OF RIGHT CLAVICLE: Status: RESOLVED | Noted: 2018-07-17 | Resolved: 2019-08-26

## 2019-08-26 PROBLEM — R55 SYNCOPE: Status: RESOLVED | Noted: 2018-07-18 | Resolved: 2019-08-26

## 2019-08-26 PROBLEM — Z86.010 HISTORY OF COLON POLYPS: Status: RESOLVED | Noted: 2017-10-24 | Resolved: 2019-08-26

## 2019-08-26 PROBLEM — R73.9 ELEVATED SERUM GLUCOSE: Status: RESOLVED | Noted: 2017-10-30 | Resolved: 2019-08-26

## 2019-08-26 PROCEDURE — 99213 OFFICE O/P EST LOW 20 MIN: CPT | Performed by: INTERNAL MEDICINE

## 2019-08-26 RX ORDER — CYCLOBENZAPRINE HCL 10 MG
10 TABLET ORAL 3 TIMES DAILY PRN
Qty: 30 TABLET | Refills: 0 | Status: SHIPPED | OUTPATIENT
Start: 2019-08-26 | End: 2020-05-18

## 2019-08-26 RX ORDER — METHYLPREDNISOLONE 4 MG/1
TABLET ORAL
Qty: 21 TABLET | Refills: 0 | Status: SHIPPED | OUTPATIENT
Start: 2019-08-26 | End: 2019-11-15

## 2019-08-26 NOTE — PROGRESS NOTES
Subjective     Sachin Almanzar is a 69 y.o. male who presents with   Chief Complaint   Patient presents with   • Back Pain       History of Present Illness     C/o back pain.  First started in January with moving boxes.  He was given Flexeril, steroids and PT.  That lasted three weeks.  Six weeks ago he moved he did more lifting. Three days ago he lifted a box weighing about 30 lbs and swung it.  He felt acute pain.  Pain is in lower back right sided.  No radiation.  He took tylenol and Flexeril.  No numbness.  No tingling.     Review of Systems   Respiratory: Negative.    Cardiovascular: Negative.    Neurological: Negative for weakness and numbness.       The following portions of the patient's history were reviewed and updated as appropriate: allergies, current medications and problem list.    Patient Active Problem List    Diagnosis Date Noted   • Palpitations 06/24/2019   • Stage 3 chronic kidney disease (CMS/[facility]) 01/10/2019   • Multiple renal cysts 05/07/2018   • Allergy to shellfish 04/19/2017   • Venous insufficiency of both lower extremities 04/21/2016   • Erectile dysfunction 02/25/2013   • Gastroesophageal reflux disease 02/25/2013   • Allergic rhinitis 07/10/2012   • Hyperlipidemia 07/10/2012   • Hypertension 07/10/2012   • Nephrolithiasis 07/10/2012     Note Last Updated: 7/27/2018     Overview:   Recurrent, Fall of 2016 (Dr. Muniz)  Had shockwave lithotripsy    Overview:   Recurrent, Fall of 2016 (Dr. Muniz)  Had shockwave lithotripsy     • Obstructive sleep apnea on CPAP 07/10/2012   • Bladder cancer (CMS/[facility]) 07/10/2012     Note Last Updated: 8/26/2019     Overview:   3/2011, Dr. Muniz, TURBT, BCG and infusion of chemo         Current Outpatient Medications on File Prior to Visit   Medication Sig Dispense Refill   • aspirin 81 MG EC tablet Take 81 mg by mouth Daily.     • atorvastatin (LIPITOR) 20 MG tablet TAKE 1 TABLET EVERY NIGHT 90 tablet 1   • carvedilol (COREG) 6.25 MG tablet TAKE 1 TABLET  "BY MOUTH EVERY 12 HOURS 60 tablet 5   • EPINEPHrine (EPIPEN) 0.3 MG/0.3ML solution auto-injector injection      • fexofenadine (ALLEGRA) 180 MG tablet Take 180 mg by mouth Daily.     • NIFEdipine CC (ADALAT CC) 60 MG 24 hr tablet TAKE 1 TABLET BY MOUTH DAILY 30 tablet 5   • raNITIdine (ZANTAC) 300 MG tablet Take 300 mg by mouth Every Night.     • sildenafil (VIAGRA) 100 MG tablet Take 100 mg by mouth As Needed for erectile dysfunction.       No current facility-administered medications on file prior to visit.        Objective     /78   Pulse 72   Ht 177.8 cm (70\")   Wt 107 kg (235 lb)   SpO2 98%   BMI 33.72 kg/m²     Physical Exam   Constitutional: He is oriented to person, place, and time. He appears well-developed and well-nourished.   HENT:   Head: Atraumatic.   Neurological: He is alert and oriented to person, place, and time. He has normal strength. No sensory deficit.   Reflex Scores:       Patellar reflexes are 2+ on the right side and 2+ on the left side.       Achilles reflexes are 2+ on the right side and 2+ on the left side.  Psychiatric: He has a normal mood and affect.       Assessment/Plan   Sachin was seen today for back pain.    Diagnoses and all orders for this visit:    Acute right-sided low back pain without sciatica  -     Ambulatory Referral to Physical Therapy    Other orders  -     methylPREDNISolone (MEDROL, ANALI,) 4 MG tablet; Take as directed on package instructions.  -     cyclobenzaprine (FLEXERIL) 10 MG tablet; Take 1 tablet by mouth 3 (Three) Times a Day As Needed for Muscle Spasms.        Discussion    Patient presents with LBP likely of muscular etiology.  I discussed with the patient a trial of conservative management with:   physical therapy, muscle relaxer and medrol dose anali.  Let me know if not feeling better over the next several weeks or if there is any change in symptoms.         Future Appointments   Date Time Provider Department Center   11/11/2019  8:10 AM " [facility] None   11/15/2019  9:15 AM Yvonne Chacko MD [facility] None   6/25/2020 10:00 AM Shorty Zaidi MD [facility] None

## 2019-09-09 ENCOUNTER — OFFICE VISIT (OUTPATIENT)
Dept: PHYSICAL THERAPY | Facility: CLINIC | Age: 69
End: 2019-09-09

## 2019-09-09 DIAGNOSIS — M54.50 ACUTE RIGHT-SIDED LOW BACK PAIN WITHOUT SCIATICA: Primary | ICD-10-CM

## 2019-09-09 PROCEDURE — 97161 PT EVAL LOW COMPLEX 20 MIN: CPT | Performed by: PHYSICAL THERAPIST

## 2019-09-09 PROCEDURE — 97110 THERAPEUTIC EXERCISES: CPT | Performed by: PHYSICAL THERAPIST

## 2019-09-09 NOTE — PATIENT INSTRUCTIONS
Access Code: [email or web address]   URL: [email or web address]   Date: 09/09/2019   Prepared by: Ольга Preciado     Exercises   Supine Hip Adduction Isometric with Ball - 10 reps - 1 sets - 5 sec. hold - 1x daily - 7x weekly   Hooklying Clamshell with Resistance - 10 reps - 1 sets - 5 sec. hold - 1x daily - 7x weekly   Supine Straight Leg Hip Adduction and Quad Set with Ball - 10 reps - 1 sets - 5 sec. hold - 1x daily - 7x weekly   Supine Hamstring Stretch - 4 reps - 1 sets - 30 sec. hold - 1x daily - 7x weekly

## 2019-09-09 NOTE — PROGRESS NOTES
Physical Therapy Initial Evaluation and Plan of Care      Patient: Sachin Almanzar   : 1950  Diagnosis/ICD-10 Code:  Acute right-sided low back pain without sciatica [M54.5]  Referring practitioner: Yvonne Chacko MD  Date of Initial Visit: 2019  Today's Date: 2019  Patient seen for 1 sessions           Subjective Questionnaire: Oswestry: 16/50      Subjective Evaluation    History of Present Illness  Date of onset: 2019  Mechanism of injury: Patient reports was moving a heavy box.  Reports he has had similar episodes in the past with a similar mechanism of injury.    PMH: Hypertension; SVT (supraventricular tachycardia) (CMS/HCC); Sleep apnea with use of continuous positive airway pressure (CPAP); Gastric ulcer; GERD (gastroesophageal reflux disease); Hyperlipidemia; Mike's disease, bladder cancer.    Subjective comment: Patient reports resolving back pain but continues with some soreness but mostly stiffness.  Patient Occupation: Retired Quality of life: good    Pain  At best pain ratin  At worst pain rating: 3  Location: Right lumbar  Quality: stiffness.  Relieving factors: medications  Aggravating factors: lifting  Progression: improved    Social Support  Lives in: one-story house  Lives with: spouse    Hand dominance: left    Treatments  Previous treatment: physical therapy  Patient Goals  Patient goals for therapy: decreased pain  Patient goal: Learn HEP.           Objective       Palpation     Right   Hypertonic in the lumbar paraspinals and quadratus lumborum. Tenderness of the quadratus lumborum.     Tenderness     Right Hip   Tenderness in the PSIS.     Active Range of Motion     Lumbar   Flexion: 28 degrees   Extension: 4 degrees   Left lateral flexion: 7 degrees   Right lateral flexion: 15 degrees   Left rotation: 35 degrees   Right rotation: 30 degrees     Strength/Myotome Testing     Lumbar   Left   Normal strength    Right Hip   Planes of Motion   Flexion: 3+    Tests      Lumbar   Positive repeated extension.     Left   Negative passive SLR.     Right   Positive passive SLR.     Right Pelvic Girdle/Sacrum   Positive: sacrum compression.     Additional Tests Details  Right PINN    Decreased bilateral LE muscle flexibility.         Assessment & Plan     Assessment  Impairments: abnormal or restricted ROM, activity intolerance, impaired physical strength and pain with function  Assessment details: Sachin Almanzar is a pleasant 69 y.o. male that presents with signs and symptoms consistent with the above diagnosis. Pt would benefit from skilled PT services in order to address listed impairments and increase tolerance to normal daily activities including ADLs, IADLs and recreational activities.  Prognosis: good  Prognosis details: Patient demonstrates good rehab potential as evidenced by high motivation to participate with PT POC and to return to PLOF/ADLs/IADLs.  Functional Limitations: uncomfortable because of pain  Goals  Plan Goals: Short Term Goals (2-3 wks):  1.  Patient will have increased lumbar spine ROM to WNLs to allow for increased functional joint mobility.  2.  Patient will have symmetrical SIJ alignment.  3.  Patient will have decreased pain to 0/10 to allow for increased comfort with functional activities.  4.  Patient will have increased lumbar spine segmental mobility to WNLs.  5.  Patient will have increased LE muscle flexibility to WNLs.    Long Term Goals (4-6 wks):  1.  Patient will be independent in performance of HEP for carryover upon discharge from skilled PT services.  2.  Patient will have improved Oswestry score of 10/50 or better.  3.  Patient will report return to performance of ADLs/Work/Sport/Leisure activities with minimal to no symptom reproduction/pain limitations.    Plan  Therapy options: will be seen for skilled physical therapy services  Planned modality interventions: thermotherapy (hydrocollator packs) and cryotherapy  Planned therapy  interventions: manual therapy, postural training, soft tissue mobilization, spinal/joint mobilization, strengthening, stretching, flexibility, functional ROM exercises and home exercise program  Frequency: 2x week  Duration in visits: 12  Treatment plan discussed with: patient  Plan details: Pt was educated on the importance of their HEP and their current need for continued skilled physical therapy. Patients goals and potential limitations were discussed and pt is in agreement with current plan of care and treatment emphasis.          Manual Therapy:         mins  60402;  Therapeutic Exercise:    20     mins  68669;     Neuromuscular Randy:        mins  53795;    Therapeutic Activity:          mins  03184;     Gait Training:           mins  37615;     Ultrasound:          mins  73890;    Electrical Stimulation:         mins  31322 ( );  Dry Needling          mins self-pay    Timed Treatment:   20   mins   Total Treatment:     60   mins    PT SIGNATURE: Ольга Preciado, TONY   DATE TREATMENT INITIATED: 9/9/2019    Initial Certification  Certification Period: 12/8/2019  I certify that the therapy services are furnished while this patient is under my care.  The services outlined above are required by this patient, and will be reviewed every 90 days.     PHYSICIAN: Yvonne Chacko MD      DATE:     Please sign and return via fax to [phone].. Thank you, [facility].

## 2019-09-10 ENCOUNTER — FLU SHOT (OUTPATIENT)
Dept: INTERNAL MEDICINE | Facility: CLINIC | Age: 69
End: 2019-09-10

## 2019-09-10 PROCEDURE — G0008 ADMIN INFLUENZA VIRUS VAC: HCPCS | Performed by: INTERNAL MEDICINE

## 2019-09-10 PROCEDURE — 90653 IIV ADJUVANT VACCINE IM: CPT | Performed by: INTERNAL MEDICINE

## 2019-09-18 ENCOUNTER — TREATMENT (OUTPATIENT)
Dept: PHYSICAL THERAPY | Facility: CLINIC | Age: 69
End: 2019-09-18

## 2019-09-18 DIAGNOSIS — M54.50 ACUTE RIGHT-SIDED LOW BACK PAIN WITHOUT SCIATICA: Primary | ICD-10-CM

## 2019-09-18 PROCEDURE — 97140 MANUAL THERAPY 1/> REGIONS: CPT | Performed by: PHYSICAL THERAPIST

## 2019-09-18 PROCEDURE — 97110 THERAPEUTIC EXERCISES: CPT | Performed by: PHYSICAL THERAPIST

## 2019-09-18 NOTE — PROGRESS NOTES
Physical Therapy Daily Progress Note      Patient: Sachin Almanzar   : 1950  Treatment Diagnosis:     ICD-10-CM ICD-9-CM   1. Acute right-sided low back pain without sciatica M54.5 724.2     Referring practitioner: Yvonne Chacko MD  Date of Initial Visit: Type: THERAPY  Noted: 2019  Today's Date: 2019  Patient seen for 2 sessions         Sachin Almanzar reports:         Subjective   Patient reports he has not been able to get into his exercises due to being out of town for a family .  States his back did not bother him too much in spite of traveling by plane.  States his back is sore and feels on the edge of becoming painful.    Objective   See Exercise, Manual, and Modality Logs for complete treatment.       Assessment/Plan  Patient presents with mild left lateral trunk shift, hypertonic left thoracic and lumbar PVM and SIJ mal-alignment.  Responded positively to correctional exercises to correct SIJ malalignment.  Patient also tolerated manual therapy interventions in order to improve hamstring flexibility, left sided lumbar mobility, and decrease tone in left thoracic/lumbar spine.  Plan to progress treatment as needed in order to continue to improve overall hip and spinal mobility, correct SIJ mal alignments, and improve strength for improved functional mobility and reduced symptoms of pain.  Progress per Plan of Care           Timed:  Manual Therapy:    36     mins  45600;  Therapeutic Exercise:    12     mins  69156;     Neuromuscular Randy:        mins  94677;    Therapeutic Activity:          mins  95578;     Gait Training:           mins  71530;     Ultrasound:          mins  20803;    Iontophoresis:          mins  91293;  Dry Needling:          mins ;    Untimed:  Electrical Stimulation:         mins  95256 ( );  Mechanical Traction:         mins  09995;     Timed Treatment:   48   mins   Total Treatment:     58   mins    Ольга Preciado PT  Physical  Therapist

## 2019-09-20 ENCOUNTER — TREATMENT (OUTPATIENT)
Dept: PHYSICAL THERAPY | Facility: CLINIC | Age: 69
End: 2019-09-20

## 2019-09-20 DIAGNOSIS — M54.50 ACUTE RIGHT-SIDED LOW BACK PAIN WITHOUT SCIATICA: Primary | ICD-10-CM

## 2019-09-20 PROCEDURE — 97140 MANUAL THERAPY 1/> REGIONS: CPT | Performed by: PHYSICAL THERAPIST

## 2019-09-20 PROCEDURE — 97110 THERAPEUTIC EXERCISES: CPT | Performed by: PHYSICAL THERAPIST

## 2019-09-20 NOTE — PROGRESS NOTES
Physical Therapy Daily Progress Note      Patient: Sachin Almanzar   : 1950  Treatment Diagnosis:     ICD-10-CM ICD-9-CM   1. Acute right-sided low back pain without sciatica M54.5 724.2     Referring practitioner: Yvonne Chacko MD  Date of Initial Visit: Type: THERAPY  Noted: 2019  Today's Date: 2019  Patient seen for 3 sessions         Sachin Almanzar reports:         Subjective   Patient reports to therapy indicating that his back has been feeling more mobile over the last couple of days.  Reports that there has been no exacerbation in pain since last session    Objective   See Exercise, Manual, and Modality Logs for complete treatment.       Assessment/Plan   Patient responded positively to manual therapy interventions in order to increase lumbo pelvic mobility and promote an increased quality of movement.  Patient noted that he believes the lumbar spine facet joint distractions have worked well in improving the amount of movement through his lumbar spine.  Glute bridges and straight arm pull downs with red TB along with TA activation for each were performed and added to home exercise program.  Patient required moderate verbal cueing for proper posture and technique with execution of both exercises.  Plan is to continue direction of treatment in improving segmental and pelvic mobility, and begin to incorporate core stabilization exercises in order to improve proximal stability and reduce pain.    Progress per Plan of Care           Timed:  Manual Therapy:    38     mins  17628;  Therapeutic Exercise:    20     mins  18350;     Neuromuscular Randy:        mins  39806;    Therapeutic Activity:          mins  41839;     Gait Training:           mins  63969;     Ultrasound:          mins  07916;    Iontophoresis:          mins  28311;  Dry Needling:          mins ;    Untimed:  Electrical Stimulation:         mins  36020 ( );  Mechanical Traction:         mins  65424;     Timed  Treatment:  58    mins   Total Treatment:    58    mins    Ольга Preciado, PT  Physical Therapist

## 2019-09-25 ENCOUNTER — TREATMENT (OUTPATIENT)
Dept: PHYSICAL THERAPY | Facility: CLINIC | Age: 69
End: 2019-09-25

## 2019-09-25 DIAGNOSIS — M54.50 ACUTE RIGHT-SIDED LOW BACK PAIN WITHOUT SCIATICA: Primary | ICD-10-CM

## 2019-09-25 DIAGNOSIS — M54.50 ACUTE BILATERAL LOW BACK PAIN WITHOUT SCIATICA: ICD-10-CM

## 2019-09-25 PROCEDURE — 97110 THERAPEUTIC EXERCISES: CPT | Performed by: PHYSICAL THERAPIST

## 2019-09-25 PROCEDURE — 97140 MANUAL THERAPY 1/> REGIONS: CPT | Performed by: PHYSICAL THERAPIST

## 2019-09-25 NOTE — PROGRESS NOTES
Physical Therapy Daily Progress Note      Patient: Sachin Almanzar   : 1950  Treatment Diagnosis:     ICD-10-CM ICD-9-CM   1. Acute right-sided low back pain without sciatica M54.5 724.2   2. Acute bilateral low back pain without sciatica M54.5 724.2     338.19     Referring practitioner: Yvonne Chacko MD  Date of Initial Visit: Type: THERAPY  Noted: 2019  Today's Date: 2019  Patient seen for 4 sessions         Sachin Almanzar reports:       Subjective   Patient reports to therapy indicating that low back is feeling a lot better since last session.  Says he has done well with HEP and believes it is helping.    Objective   See Exercise, Manual, and Modality Logs for complete treatment.       Assessment/Plan  Assessment of SI alignment showed normal alignment, and therefore corrective sequence was not performed during this therapy session.  Soft tissue mobilization indicated to treat sore spot in right low back.  Patient responded positively to treatment interventions aimed at promoting increased core and lumbar stabilization.  Patient required verbal and tactile cueing in order to demonstrate proper TA activation.  Plan to continue direction of treatment in order to promote normal SI alignment and core stabilization for reduction of pain symptoms during activity.  Progress per Plan of Care           Timed:  Manual Therapy:    8     mins  30616;  Therapeutic Exercise:    30     mins  82372;     Neuromuscular Randy:        mins  66191;    Therapeutic Activity:          mins  81208;     Gait Training:           mins  23161;     Ultrasound:          mins  04417;    Iontophoresis:          mins  14746;  Dry Needling:          mins ;    Untimed:  Electrical Stimulation:         mins  34541 ( );  Mechanical Traction:         mins  54315;     Timed Treatment:   38   mins   Total Treatment:     38   mins    Ольга Preciado PT  Physical Therapist

## 2019-09-25 NOTE — PROGRESS NOTES
I have reviewed the notes, assessments, and/or procedures performed by Dillan Arceo PT Student, I concur with her/his documentation of Sachin Almanzar.

## 2019-09-30 ENCOUNTER — TREATMENT (OUTPATIENT)
Dept: PHYSICAL THERAPY | Facility: CLINIC | Age: 69
End: 2019-09-30

## 2019-09-30 DIAGNOSIS — M54.50 ACUTE BILATERAL LOW BACK PAIN WITHOUT SCIATICA: ICD-10-CM

## 2019-09-30 DIAGNOSIS — M54.50 ACUTE RIGHT-SIDED LOW BACK PAIN WITHOUT SCIATICA: Primary | ICD-10-CM

## 2019-09-30 PROCEDURE — G0283 ELEC STIM OTHER THAN WOUND: HCPCS | Performed by: PHYSICAL THERAPIST

## 2019-09-30 PROCEDURE — 97140 MANUAL THERAPY 1/> REGIONS: CPT | Performed by: PHYSICAL THERAPIST

## 2019-09-30 PROCEDURE — 97110 THERAPEUTIC EXERCISES: CPT | Performed by: PHYSICAL THERAPIST

## 2019-10-02 ENCOUNTER — TREATMENT (OUTPATIENT)
Dept: PHYSICAL THERAPY | Facility: CLINIC | Age: 69
End: 2019-10-02

## 2019-10-02 DIAGNOSIS — M54.50 ACUTE RIGHT-SIDED LOW BACK PAIN WITHOUT SCIATICA: Primary | ICD-10-CM

## 2019-10-02 DIAGNOSIS — M54.50 ACUTE BILATERAL LOW BACK PAIN WITHOUT SCIATICA: ICD-10-CM

## 2019-10-02 PROCEDURE — G0283 ELEC STIM OTHER THAN WOUND: HCPCS | Performed by: PHYSICAL THERAPIST

## 2019-10-02 PROCEDURE — 97110 THERAPEUTIC EXERCISES: CPT | Performed by: PHYSICAL THERAPIST

## 2019-10-02 NOTE — PROGRESS NOTES
Physical Therapy Daily Progress Note      Patient: Sachin Almanzar   : 1950  Treatment Diagnosis:     ICD-10-CM ICD-9-CM   1. Acute right-sided low back pain without sciatica M54.5 724.2   2. Acute bilateral low back pain without sciatica M54.5 724.2     338.19     Referring practitioner: Yvonne Chacko MD  Date of Initial Visit: Type: THERAPY  Noted: 2019  Today's Date: 10/2/2019  Patient seen for 6 sessions         Sachin Almanzar reports:       Subjective   Patient reports to therapy today noting no new c/o of LBP symptom exacerbation from over the weekend.  States that he has had no pain as of recently and believes that therapy is helping.    Objective   See Exercise, Manual, and Modality Logs for complete treatment.       Assessment/Plan  Patient responded positively to treatment interventions aimed at promoting core stability.  Required mild verbal and tactile cueing for proper activation of deep core musculature.  Addition of side steps with TA activation and red TB was added for treatment progression.  Plan to continue direction of treatment in order to promote overall core stabilization and increase dynamic support of low back and pelvis.  Will also treat exacerbations of pain symptoms as needed.  Progress per Plan of Care           Timed:  Manual Therapy:         mins  46134;  Therapeutic Exercise:    50     mins  82375;     Neuromuscular Randy:        mins  67895;    Therapeutic Activity:          mins  64407;     Gait Training:           mins  46169;     Ultrasound:          mins  71888;    Iontophoresis:          mins  73039;  Dry Needling:          mins ;    Untimed:  Electrical Stimulation:    15     mins  72506 ( );  Mechanical Traction:         mins  41000;     Timed Treatment:   50   mins   Total Treatment:     65   mins    Arthur Arceo PT Student

## 2019-10-07 ENCOUNTER — TREATMENT (OUTPATIENT)
Dept: PHYSICAL THERAPY | Facility: CLINIC | Age: 69
End: 2019-10-07

## 2019-10-07 DIAGNOSIS — M54.50 ACUTE RIGHT-SIDED LOW BACK PAIN WITHOUT SCIATICA: Primary | ICD-10-CM

## 2019-10-07 DIAGNOSIS — M54.50 ACUTE BILATERAL LOW BACK PAIN WITHOUT SCIATICA: ICD-10-CM

## 2019-10-07 PROCEDURE — 97110 THERAPEUTIC EXERCISES: CPT | Performed by: PHYSICAL THERAPIST

## 2019-10-07 PROCEDURE — G0283 ELEC STIM OTHER THAN WOUND: HCPCS | Performed by: PHYSICAL THERAPIST

## 2019-10-07 PROCEDURE — 97140 MANUAL THERAPY 1/> REGIONS: CPT | Performed by: PHYSICAL THERAPIST

## 2019-10-09 ENCOUNTER — TREATMENT (OUTPATIENT)
Dept: PHYSICAL THERAPY | Facility: CLINIC | Age: 69
End: 2019-10-09

## 2019-10-09 DIAGNOSIS — M54.50 ACUTE RIGHT-SIDED LOW BACK PAIN WITHOUT SCIATICA: Primary | ICD-10-CM

## 2019-10-09 DIAGNOSIS — M54.50 ACUTE BILATERAL LOW BACK PAIN WITHOUT SCIATICA: ICD-10-CM

## 2019-10-09 PROCEDURE — G0283 ELEC STIM OTHER THAN WOUND: HCPCS | Performed by: PHYSICAL THERAPIST

## 2019-10-09 PROCEDURE — 97110 THERAPEUTIC EXERCISES: CPT | Performed by: PHYSICAL THERAPIST

## 2019-10-09 NOTE — PROGRESS NOTES
Physical Therapy Re-Assessment / Re-Certification        Patient: Sachin Almanzar   : 1950  Visit Diagnoses:     ICD-10-CM ICD-9-CM   1. Acute right-sided low back pain without sciatica M54.5 724.2   2. Acute bilateral low back pain without sciatica M54.5 724.2     338.19     Referring practitioner: Yvonne Chacko MD  Date of Initial Visit: Type: THERAPY  Noted: 2019  Today's Date: 10/9/2019  Patient seen for 8 sessions      Subjective:   Sachin Almanzar reports:   Subjective Questionnaire: Oswestry:   Clinical Progress: improved  Home Program Compliance: Yes  Treatment has included: therapeutic exercise, manual therapy, electrical stimulation and cryotherapy    Subjective   Patient reports to therapy today noting slight soreness in hips due to side stepping exercise added to HEP.  Patient has no acute exacerbations of pain to report, and notes that at worst pain levels are 1/10 or lower.    Objective       Active Range of Motion      Lumbar   Flexion: 31 degrees   Extension: 15 degrees   Left lateral flexion: 15 degrees   Right lateral flexion: 22 degrees   Left rotation: 39 degrees   Right rotation: 43 degrees      Strength/Myotome Testing      Lumbar   Left   Normal strength     Right Hip   Planes of Motion   Flexion: 4     Tests       Decreased bilateral LE muscle flexibility.      Assessment/Plan  Patient demonstrates improved ROM and hip strength, and has been progressing appropriately.  Patient has been compliant with HEP and reports of pain symptoms have gradually decreased.  Normal alignment of SIJ has also been consistent for several visits now.  Patient responded positively to treatment interventions aimed at improving core and hip stability.  Plan to continue direction of care in order improve muscle flexibility and lumbar ROM, and also increase strength for improvements in core stabilization.    Progress toward previous goals: Partially Met  Goal for normal SI joint alignment met  due to consistent presentation of SIJ alignment in recent visits.    Goal Review  Short Term Goals (2 wks):  1.  Patient will have increased lumbar spine ROM to WNLs to allow for increased functional joint mobility.  2.  Patient will have decreased pain to 0/10 to allow for increased comfort with functional activities.  3.  Patient will have increased lumbar spine segmental mobility to WNLs.  4.  Patient will have increased LE muscle flexibility to WNLs.    Long Term Goals (4 wks):  1.  Patient will be independent in performance of HEP for carryover upon discharge from skilled PT services.  2.  Patient will have improved Oswestry score of 10/50 or better.  3.  Patient will report return to performance of ADLs/Work/Sport/Leisure activities with minimal to no symptom reproduction/pain limitations.      Recommendations: Continue as planned  Timeframe: 1 month  Prognosis to achieve goals: roxanne Arceo, PT Student    Based upon review of the patient's progress and continued therapy plan, it is my medical opinion that Sachin Almanzar should continue physical therapy treatment at [facility]  [address] [place] [id number] [phone].    Signature: __________________________________  Yvonne Chacko MD    Timed:  Manual Therapy:         mins  69416;  Therapeutic Exercise:    30     mins  11019; (14 minutes concurrent)     Neuromuscular Randy:        mins  92984;    Therapeutic Activity:          mins  81359;     Gait Training:           mins  49338;     Ultrasound:          mins  33312;  Iontophoresis:          mins  11245;    Dry Needling:          mins ;    Untimed:  Electrical Stimulation:    15     mins  88466 ( );  Mechanical Traction:         mins  51633;     Timed Treatment:   30   mins   Total Treatment:     59   mins

## 2019-10-14 ENCOUNTER — TREATMENT (OUTPATIENT)
Dept: PHYSICAL THERAPY | Facility: CLINIC | Age: 69
End: 2019-10-14

## 2019-10-14 DIAGNOSIS — M54.50 ACUTE RIGHT-SIDED LOW BACK PAIN WITHOUT SCIATICA: Primary | ICD-10-CM

## 2019-10-14 DIAGNOSIS — M54.50 ACUTE BILATERAL LOW BACK PAIN WITHOUT SCIATICA: ICD-10-CM

## 2019-10-14 PROCEDURE — 97110 THERAPEUTIC EXERCISES: CPT | Performed by: PHYSICAL THERAPIST

## 2019-10-14 PROCEDURE — G0283 ELEC STIM OTHER THAN WOUND: HCPCS | Performed by: PHYSICAL THERAPIST

## 2019-10-14 NOTE — PROGRESS NOTES
Physical Therapy Daily Progress Note      Patient: Sachin Almanzar   : 1950  Treatment Diagnosis:     ICD-10-CM ICD-9-CM   1. Acute right-sided low back pain without sciatica M54.5 724.2   2. Acute bilateral low back pain without sciatica M54.5 724.2     338.19     Referring practitioner: Yvonne Chacko MD  Date of Initial Visit: Type: THERAPY  Noted: 2019  Today's Date: 10/14/2019  Patient seen for 9 sessions         Sachin Almanzar reports:       Subjective   Patient reports to therapy with no acute c/o pain or symptom exacerbation from over the weekend.  Notes that he has been feeling slight soreness in hips due to side stepping exercises which he believes is working.    Objective   See Exercise, Manual, and Modality Logs for complete treatment.       Assessment/Plan  Patient responded positively to treatment interventions aimed at increasing core and LE strength/stability.  Patient was progressed to green TB with straight arm pull downs, and addition of BLE step ups were added into exercise regiment in order to beginning working towards more functional exercises.  Patient required verbal cueing for proper TA and glute activation for standing stabilization exercises.  Plan to progress patient accordingly in order to improve LE and core stabilization with functional activity in order to improve performance with ADL and recreational activity participation.  Progress per Plan of Care           Timed:  Manual Therapy:         mins  15986;  Therapeutic Exercise:    46     mins  77178;     Neuromuscular Randy:        mins  47724;    Therapeutic Activity:          mins  94032;     Gait Training:           mins  88695;     Ultrasound:          mins  01259;    Iontophoresis:          mins  86204;  Dry Needling:          mins ;    Untimed:  Electrical Stimulation:    15     mins  04921 ( );  Mechanical Traction:         mins  09571;     Timed Treatment:   46   mins   Total Treatment:     61    mins    Arthur Arceo, PT Student

## 2019-10-23 ENCOUNTER — TREATMENT (OUTPATIENT)
Dept: PHYSICAL THERAPY | Facility: CLINIC | Age: 69
End: 2019-10-23

## 2019-10-23 DIAGNOSIS — M54.50 ACUTE RIGHT-SIDED LOW BACK PAIN WITHOUT SCIATICA: Primary | ICD-10-CM

## 2019-10-23 DIAGNOSIS — M54.50 ACUTE BILATERAL LOW BACK PAIN WITHOUT SCIATICA: ICD-10-CM

## 2019-10-23 PROCEDURE — 97110 THERAPEUTIC EXERCISES: CPT | Performed by: PHYSICAL THERAPIST

## 2019-10-23 NOTE — PROGRESS NOTES
Physical Therapy Re-Assessment / Discharge        Patient: Sachin Almanzar   : 1950  Visit Diagnoses:     ICD-10-CM ICD-9-CM   1. Acute right-sided low back pain without sciatica M54.5 724.2   2. Acute bilateral low back pain without sciatica M54.5 724.2     338.19     Referring practitioner: Yvonne Chacko MD  Date of Initial Visit: Type: THERAPY  Noted: 2019  Today's Date: 10/23/2019  Patient seen for 10 sessions      Subjective:   Sachin Almanzar reports:   Subjective Questionnaire: Oswestry:   Clinical Progress: improved  Home Program Compliance: Yes  Treatment has included: therapeutic exercise, manual therapy, electrical stimulation and cryotherapy    Subjective   Patient reports to therapy noting that he does not have any acute exacerbations in pain as of recently, and feels as if he has not had any complaints of pain in about a month.  States that he feels very confident with [facility] and has returned to normal function with ADL and recreational activities.    Objective   Active Range of Motion      Lumbar   Flexion: 36 degrees   Extension: 22 degrees   Left lateral flexion: 15 degrees   Right lateral flexion: 22 degrees   Left rotation: 47 degrees   Right rotation: 48 degrees      Strength/Myotome Testing      Right Hip   Planes of Motion   Flexion: 4+     Tests       Decreased bilateral LE muscle flexibility.        Assessment/Plan  Patient demonstrates improved lumbar ROM, hip strength, and muscle flexibility, and has been progressing appropriately.  Still has deficits in strength with R hip flexion, and decreased hip flexor and hamstring muscle length.  Patient has been compliant with HEP and feels confident in his ability to perform them.  Normal alignment of SIJ has also been consistent.  Patient responded positively to treatment interventions aimed at improving core and hip stability.  Patient in agreement for discharge to [facility] and return to normal recreational activity/ADL  completion, noting that he believes that he can manage symptoms with HEP.  Patient educated on icing after exercising with onset of soreness.  Proper technique for HEP reviewed, and it was discussed to schedule more visits should an acute exacerbation of pain symptoms arise.    Progress toward previous goals: Partially Met  Not all goals met due to remaining decreased muscle flexibility and strength in hip musculature, but patient has agreed to discharge with confidence in ability to manage symptoms on his own.    Goal Review  Short Term Goals:  1.  Patient will have increased lumbar spine ROM to WNLs to allow for increased functional joint mobility.  2.  Patient will have decreased pain to 0/10 to allow for increased comfort with functional activities.  3.  Patient will have increased lumbar spine segmental mobility to WNLs.  4.  Patient will have increased LE muscle flexibility to WNLs.    Long Term Goals:  1.  Patient will be independent in performance of HEP for carryover upon discharge from skilled PT services.  2.  Patient will have improved Oswestry score of 10/50 or better.  3.  Patient will report return to performance of ADLs/Work/Sport/Leisure activities with minimal to no symptom reproduction/pain limitations.    Recommendations: Discharge    PT Signature: Arthur Arceo, PT Student    Timed:  Manual Therapy:         mins  31309;  Therapeutic Exercise:    30     mins  00953; (14 minutes concurrent)    Neuromuscular Randy:        mins  14711;    Therapeutic Activity:          mins  10485;     Gait Training:           mins  75627;     Ultrasound:          mins  03931;  Iontophoresis:          mins  34444;    Dry Needling:          mins ;    Untimed:  Electrical Stimulation:         mins  04780 ( );  Mechanical Traction:         mins  87490;     Timed Treatment:   30   mins   Total Treatment:     44   mins

## 2019-10-30 RX ORDER — ATORVASTATIN CALCIUM 20 MG/1
20 TABLET, FILM COATED ORAL NIGHTLY
Qty: 90 TABLET | Refills: 1 | Status: SHIPPED | OUTPATIENT
Start: 2019-10-30 | End: 2020-01-31 | Stop reason: SDUPTHER

## 2019-11-11 DIAGNOSIS — E78.2 MIXED HYPERLIPIDEMIA: Primary | ICD-10-CM

## 2019-11-11 DIAGNOSIS — I10 ESSENTIAL HYPERTENSION: ICD-10-CM

## 2019-11-12 LAB
ALBUMIN SERPL-MCNC: 4.2 G/DL (ref 3.5–5.2)
ALBUMIN/GLOB SERPL: 1.8 G/DL
ALP SERPL-CCNC: 60 U/L (ref 39–117)
ALT SERPL-CCNC: 25 U/L (ref 1–41)
APPEARANCE UR: CLEAR
AST SERPL-CCNC: 23 U/L (ref 1–40)
BACTERIA #/AREA URNS HPF: ABNORMAL /HPF
BILIRUB SERPL-MCNC: 0.6 MG/DL (ref 0.2–1.2)
BILIRUB UR QL STRIP: NEGATIVE
BUN SERPL-MCNC: 17 MG/DL (ref 8–23)
BUN/CREAT SERPL: 13.2 (ref 7–25)
CALCIUM SERPL-MCNC: 9.6 MG/DL (ref 8.6–10.5)
CHLORIDE SERPL-SCNC: 104 MMOL/L (ref 98–107)
CHOLEST SERPL-MCNC: 112 MG/DL (ref 0–200)
CO2 SERPL-SCNC: 26.6 MMOL/L (ref 22–29)
COLOR UR: YELLOW
CREAT SERPL-MCNC: 1.29 MG/DL (ref 0.76–1.27)
CRYSTALS URNS MICRO: ABNORMAL
EPI CELLS #/AREA URNS HPF: ABNORMAL /HPF
GLOBULIN SER CALC-MCNC: 2.3 GM/DL
GLUCOSE SERPL-MCNC: 123 MG/DL (ref 65–99)
GLUCOSE UR QL: NEGATIVE
HDLC SERPL-MCNC: 30 MG/DL (ref 40–60)
HGB UR QL STRIP: NEGATIVE
KETONES UR QL STRIP: NEGATIVE
LDLC SERPL CALC-MCNC: 51 MG/DL (ref 0–100)
LEUKOCYTE ESTERASE UR QL STRIP: NEGATIVE
MICRO URNS: NORMAL
MICRO URNS: NORMAL
MUCOUS THREADS URNS QL MICRO: PRESENT /HPF
NITRITE UR QL STRIP: NEGATIVE
PH UR STRIP: 7 [PH] (ref 5–7.5)
POTASSIUM SERPL-SCNC: 4.3 MMOL/L (ref 3.5–5.2)
PROT SERPL-MCNC: 6.5 G/DL (ref 6–8.5)
PROT UR QL STRIP: NEGATIVE
RBC #/AREA URNS HPF: ABNORMAL /HPF
SODIUM SERPL-SCNC: 141 MMOL/L (ref 136–145)
SP GR UR: 1.01 (ref 1–1.03)
TRIGL SERPL-MCNC: 155 MG/DL (ref 0–150)
UNIDENT CRYS URNS QL MICRO: PRESENT /LPF
URINALYSIS REFLEX: NORMAL
UROBILINOGEN UR STRIP-MCNC: 0.2 MG/DL (ref 0.2–1)
VLDLC SERPL CALC-MCNC: 31 MG/DL
WBC #/AREA URNS HPF: ABNORMAL /HPF

## 2019-11-15 ENCOUNTER — OFFICE VISIT (OUTPATIENT)
Dept: INTERNAL MEDICINE | Facility: CLINIC | Age: 69
End: 2019-11-15

## 2019-11-15 VITALS
WEIGHT: 234 LBS | DIASTOLIC BLOOD PRESSURE: 80 MMHG | SYSTOLIC BLOOD PRESSURE: 130 MMHG | BODY MASS INDEX: 33.5 KG/M2 | HEIGHT: 70 IN | OXYGEN SATURATION: 97 % | HEART RATE: 95 BPM

## 2019-11-15 DIAGNOSIS — I10 ESSENTIAL HYPERTENSION: Primary | ICD-10-CM

## 2019-11-15 DIAGNOSIS — E78.2 MIXED HYPERLIPIDEMIA: ICD-10-CM

## 2019-11-15 DIAGNOSIS — N18.30 STAGE 3 CHRONIC KIDNEY DISEASE (HCC): ICD-10-CM

## 2019-11-15 DIAGNOSIS — C67.9 MALIGNANT NEOPLASM OF URINARY BLADDER, UNSPECIFIED SITE (HCC): ICD-10-CM

## 2019-11-15 PROBLEM — R00.2 PALPITATIONS: Status: RESOLVED | Noted: 2019-06-24 | Resolved: 2019-11-15

## 2019-11-15 PROCEDURE — 99214 OFFICE O/P EST MOD 30 MIN: CPT | Performed by: INTERNAL MEDICINE

## 2019-11-15 RX ORDER — SULFACETAMIDE SODIUM 100 MG/ML
LOTION TOPICAL
Refills: 1 | COMMUNITY
Start: 2019-09-18 | End: 2020-05-18

## 2019-11-15 NOTE — PROGRESS NOTES
Subjective     Sacihn Almanzar is a 69 y.o. male who presents with   Chief Complaint   Patient presents with   • Hypertension   • Hyperlipidemia   • Chronic Kidney Disease       History of Present Illness     HTN.  BP is under good control.   HLD.  Good control on atorvastatin.    CKD.  Reviewed numbers which are stable.   History of bladder cancer.  He is followed by urology.       Review of Systems   Respiratory: Negative.    Cardiovascular: Negative.        The following portions of the patient's history were reviewed and updated as appropriate: allergies, current medications and problem list.    Patient Active Problem List    Diagnosis Date Noted   • Stage 3 chronic kidney disease (CMS/HCC) 01/10/2019   • Multiple renal cysts 05/07/2018   • Allergy to shellfish 04/19/2017   • Venous insufficiency of both lower extremities 04/21/2016   • Erectile dysfunction 02/25/2013   • Gastroesophageal reflux disease 02/25/2013   • Allergic rhinitis 07/10/2012   • Hyperlipidemia 07/10/2012   • Hypertension 07/10/2012   • Nephrolithiasis 07/10/2012     Note Last Updated: 7/27/2018     Overview:   Recurrent, Fall of 2016 (Dr. Muniz)  Had shockwave lithotripsy    Overview:   Recurrent, Fall of 2016 (Dr. Muniz)  Had shockwave lithotripsy     • Obstructive sleep apnea on CPAP 07/10/2012   • Bladder cancer (CMS/[facility]) 07/10/2012     Note Last Updated: 8/26/2019     Overview:   3/2011, Dr. Muniz, TURBT, BCG and infusion of chemo         Current Outpatient Medications on File Prior to Visit   Medication Sig Dispense Refill   • atorvastatin (LIPITOR) 20 MG tablet Take 1 tablet by mouth Every Night. 90 tablet 1   • carvedilol (COREG) 6.25 MG tablet TAKE 1 TABLET BY MOUTH EVERY 12 HOURS 60 tablet 5   • cyclobenzaprine (FLEXERIL) 10 MG tablet Take 1 tablet by mouth 3 (Three) Times a Day As Needed for Muscle Spasms. 30 tablet 0   • EPINEPHrine (EPIPEN) 0.3 MG/0.3ML solution auto-injector injection      • fexofenadine (ALLEGRA) 180 MG  "tablet Take 180 mg by mouth Daily.     • NIFEdipine CC (ADALAT CC) 60 MG 24 hr tablet TAKE 1 TABLET BY MOUTH DAILY 30 tablet 5   • raNITIdine (ZANTAC) 300 MG tablet Take 300 mg by mouth Every Night.     • sildenafil (VIAGRA) 100 MG tablet Take 100 mg by mouth As Needed for erectile dysfunction.     • Sulfacetamide Sodium, Acne, 10 % lotion USE AS DIRECTED QD  1   • [DISCONTINUED] aspirin 81 MG EC tablet Take 81 mg by mouth Daily.     • [DISCONTINUED] methylPREDNISolone (MEDROL, ANALI,) 4 MG tablet Take as directed on package instructions. 21 tablet 0     No current facility-administered medications on file prior to visit.        Objective     /80   Pulse 95   Ht 177.8 cm (70\")   Wt 106 kg (234 lb)   SpO2 97%   BMI 33.58 kg/m²     Physical Exam   Constitutional: He is oriented to person, place, and time. He appears well-developed and well-nourished.   HENT:   Head: Atraumatic.   Cardiovascular: Normal rate, regular rhythm and normal heart sounds.   Pulmonary/Chest: Effort normal and breath sounds normal.   Neurological: He is alert and oriented to person, place, and time.   Skin: Skin is warm and dry.   Psychiatric: He has a normal mood and affect.       Assessment/Plan   Sachin was seen today for hypertension, hyperlipidemia and chronic kidney disease.    Diagnoses and all orders for this visit:    Essential hypertension    Mixed hyperlipidemia    Stage 3 chronic kidney disease (CMS/HCC)    Malignant neoplasm of urinary bladder, unspecified site (CMS/HCC)        Discussion    HTN.  The patient will continue current regimen.      HLD.  The patient will continue current regimen.      CKD.   We discussed the importance of NSAID avoidance.  We discussed the importance of water consumption.  We discussed avoiding constrast studies like CTs whenever possible.    History of bladder cancer.  Continue to follow with urology.         Future Appointments   Date Time Provider Department Center   5/14/2020  9:20 AM " [facility] None   5/18/2020  1:30 PM Yvonne Chacko MD [facility] None   6/25/2020 10:00 AM Shorty Zaidi MD [facility] None

## 2019-12-30 RX ORDER — NIFEDIPINE 60 MG/1
60 TABLET, FILM COATED, EXTENDED RELEASE ORAL
Qty: 90 TABLET | Refills: 2 | Status: SHIPPED | OUTPATIENT
Start: 2019-12-30 | End: 2020-09-21

## 2019-12-30 RX ORDER — CARVEDILOL 6.25 MG/1
TABLET ORAL
Qty: 180 TABLET | Refills: 2 | Status: SHIPPED | OUTPATIENT
Start: 2019-12-30 | End: 2020-09-21

## 2020-01-31 RX ORDER — ATORVASTATIN CALCIUM 20 MG/1
20 TABLET, FILM COATED ORAL NIGHTLY
Qty: 7 TABLET | Refills: 11 | Status: SHIPPED | OUTPATIENT
Start: 2020-01-31 | End: 2020-04-27

## 2020-04-27 RX ORDER — ATORVASTATIN CALCIUM 20 MG/1
TABLET, FILM COATED ORAL
Qty: 90 TABLET | Refills: 3 | Status: SHIPPED | OUTPATIENT
Start: 2020-04-27 | End: 2021-04-22

## 2020-05-13 DIAGNOSIS — Z00.00 MEDICARE ANNUAL WELLNESS VISIT, SUBSEQUENT: Primary | ICD-10-CM

## 2020-05-14 ENCOUNTER — APPOINTMENT (OUTPATIENT)
Dept: LAB | Facility: HOSPITAL | Age: 70
End: 2020-05-14

## 2020-05-14 LAB
ALBUMIN SERPL-MCNC: 4 G/DL (ref 3.5–5.2)
ALBUMIN/GLOB SERPL: 1.3 G/DL
ALP SERPL-CCNC: 52 U/L (ref 39–117)
ALT SERPL W P-5'-P-CCNC: 28 U/L (ref 1–41)
ANION GAP SERPL CALCULATED.3IONS-SCNC: 13.8 MMOL/L (ref 5–15)
AST SERPL-CCNC: 23 U/L (ref 1–40)
BACTERIA UR QL AUTO: NORMAL /HPF
BASOPHILS # BLD AUTO: 0.04 10*3/MM3 (ref 0–0.2)
BASOPHILS NFR BLD AUTO: 0.7 % (ref 0–1.5)
BILIRUB SERPL-MCNC: 0.8 MG/DL (ref 0.2–1.2)
BILIRUB UR QL STRIP: NEGATIVE
BUN BLD-MCNC: 22 MG/DL (ref 8–23)
BUN/CREAT SERPL: 16.9 (ref 7–25)
CALCIUM SPEC-SCNC: 9.6 MG/DL (ref 8.6–10.5)
CHLORIDE SERPL-SCNC: 99 MMOL/L (ref 98–107)
CHOLEST SERPL-MCNC: 114 MG/DL (ref 0–200)
CLARITY UR: CLEAR
CO2 SERPL-SCNC: 24.2 MMOL/L (ref 22–29)
COLOR UR: YELLOW
CREAT BLD-MCNC: 1.3 MG/DL (ref 0.76–1.27)
DEPRECATED RDW RBC AUTO: 44.6 FL (ref 37–54)
EOSINOPHIL # BLD AUTO: 0.24 10*3/MM3 (ref 0–0.4)
EOSINOPHIL NFR BLD AUTO: 3.9 % (ref 0.3–6.2)
ERYTHROCYTE [DISTWIDTH] IN BLOOD BY AUTOMATED COUNT: 13 % (ref 12.3–15.4)
GFR SERPL CREATININE-BSD FRML MDRD: 54 ML/MIN/1.73
GLOBULIN UR ELPH-MCNC: 3 GM/DL
GLUCOSE BLD-MCNC: 135 MG/DL (ref 65–99)
GLUCOSE UR STRIP-MCNC: NEGATIVE MG/DL
HCT VFR BLD AUTO: 41.1 % (ref 37.5–51)
HDLC SERPL-MCNC: 31 MG/DL (ref 40–60)
HGB BLD-MCNC: 14.2 G/DL (ref 13–17.7)
HGB UR QL STRIP.AUTO: NEGATIVE
HYALINE CASTS UR QL AUTO: NORMAL /LPF
IMM GRANULOCYTES # BLD AUTO: 0.06 10*3/MM3 (ref 0–0.05)
IMM GRANULOCYTES NFR BLD AUTO: 1 % (ref 0–0.5)
KETONES UR QL STRIP: NEGATIVE
LDLC SERPL CALC-MCNC: 26 MG/DL (ref 0–100)
LDLC/HDLC SERPL: 0.83 {RATIO}
LEUKOCYTE ESTERASE UR QL STRIP.AUTO: NEGATIVE
LYMPHOCYTES # BLD AUTO: 0.91 10*3/MM3 (ref 0.7–3.1)
LYMPHOCYTES NFR BLD AUTO: 14.8 % (ref 19.6–45.3)
MCH RBC QN AUTO: 32.6 PG (ref 26.6–33)
MCHC RBC AUTO-ENTMCNC: 34.5 G/DL (ref 31.5–35.7)
MCV RBC AUTO: 94.5 FL (ref 79–97)
MONOCYTES # BLD AUTO: 0.77 10*3/MM3 (ref 0.1–0.9)
MONOCYTES NFR BLD AUTO: 12.6 % (ref 5–12)
NEUTROPHILS # BLD AUTO: 4.11 10*3/MM3 (ref 1.7–7)
NEUTROPHILS NFR BLD AUTO: 67 % (ref 42.7–76)
NITRITE UR QL STRIP: NEGATIVE
NRBC BLD AUTO-RTO: 0 /100 WBC (ref 0–0.2)
PH UR STRIP.AUTO: 6 [PH] (ref 5–8)
PLATELET # BLD AUTO: 215 10*3/MM3 (ref 140–450)
PMV BLD AUTO: 9.5 FL (ref 6–12)
POTASSIUM BLD-SCNC: 4.1 MMOL/L (ref 3.5–5.2)
PROT SERPL-MCNC: 7 G/DL (ref 6–8.5)
PROT UR QL STRIP: NEGATIVE
RBC # BLD AUTO: 4.35 10*6/MM3 (ref 4.14–5.8)
RBC # UR: NORMAL /HPF
REF LAB TEST METHOD: NORMAL
SODIUM BLD-SCNC: 137 MMOL/L (ref 136–145)
SP GR UR STRIP: 1.02 (ref 1–1.03)
SQUAMOUS #/AREA URNS HPF: NORMAL /HPF
TRIGL SERPL-MCNC: 286 MG/DL (ref 0–150)
TSH SERPL DL<=0.05 MIU/L-ACNC: 2.04 UIU/ML (ref 0.27–4.2)
UROBILINOGEN UR QL STRIP: NORMAL
VLDLC SERPL-MCNC: 57.2 MG/DL (ref 5–40)
WBC NRBC COR # BLD: 6.13 10*3/MM3 (ref 3.4–10.8)
WBC UR QL AUTO: NORMAL /HPF

## 2020-05-14 PROCEDURE — 81001 URINALYSIS AUTO W/SCOPE: CPT | Performed by: INTERNAL MEDICINE

## 2020-05-14 PROCEDURE — 84443 ASSAY THYROID STIM HORMONE: CPT | Performed by: INTERNAL MEDICINE

## 2020-05-14 PROCEDURE — 85025 COMPLETE CBC W/AUTO DIFF WBC: CPT | Performed by: INTERNAL MEDICINE

## 2020-05-14 PROCEDURE — 36415 COLL VENOUS BLD VENIPUNCTURE: CPT | Performed by: INTERNAL MEDICINE

## 2020-05-14 PROCEDURE — 80061 LIPID PANEL: CPT | Performed by: INTERNAL MEDICINE

## 2020-05-14 PROCEDURE — 80053 COMPREHEN METABOLIC PANEL: CPT | Performed by: INTERNAL MEDICINE

## 2020-05-18 ENCOUNTER — OFFICE VISIT (OUTPATIENT)
Dept: INTERNAL MEDICINE | Facility: CLINIC | Age: 70
End: 2020-05-18

## 2020-05-18 VITALS — SYSTOLIC BLOOD PRESSURE: 128 MMHG | DIASTOLIC BLOOD PRESSURE: 78 MMHG

## 2020-05-18 DIAGNOSIS — Z00.00 MEDICARE ANNUAL WELLNESS VISIT, SUBSEQUENT: Primary | ICD-10-CM

## 2020-05-18 DIAGNOSIS — I10 ESSENTIAL HYPERTENSION: ICD-10-CM

## 2020-05-18 DIAGNOSIS — E78.2 MIXED HYPERLIPIDEMIA: ICD-10-CM

## 2020-05-18 DIAGNOSIS — R73.01 IFG (IMPAIRED FASTING GLUCOSE): ICD-10-CM

## 2020-05-18 PROCEDURE — G0439 PPPS, SUBSEQ VISIT: HCPCS | Performed by: INTERNAL MEDICINE

## 2020-05-18 PROCEDURE — 99213 OFFICE O/P EST LOW 20 MIN: CPT | Performed by: INTERNAL MEDICINE

## 2020-05-18 NOTE — PROGRESS NOTES
Subjective     Sachin Almanzar is a 69 y.o. male who presents for an annual wellness visit as well as check up of htn ,hld, ifg.      History of Present Illness     You have chosen to receive care through a telehealth visit.  Do you consent to use a video/audio connection for your medical care today? Yes.   Total time of visit is 35 minutes.      HTN.  Control is good at home.   HLD.  Control is fair on atorvastatin.    IFG.  BS is 135.  He states he comfort eating more and not exercising.     Review of Systems   Constitutional: Negative.    HENT: Negative.    Eyes: Negative.    Respiratory: Negative.    Cardiovascular: Negative.    Endocrine: Negative.    Genitourinary: Negative.    Musculoskeletal: Negative.    Skin: Negative.    Allergic/Immunologic: Negative.    Neurological: Negative.    Hematological: Negative.    Psychiatric/Behavioral: Negative.        The following portions of the patient's history were reviewed and updated as appropriate: allergies, current medications, past family history, past medical history, past social history, past surgical history and problem list.  Health maintenance tab was reviewed and updated with the patient.       Patient Active Problem List    Diagnosis Date Noted   • Stage 3 chronic kidney disease (CMS/[facility]) 01/10/2019   • Multiple renal cysts 05/07/2018   • Allergy to shellfish 04/19/2017   • Venous insufficiency of both lower extremities 04/21/2016   • Erectile dysfunction 02/25/2013   • Gastroesophageal reflux disease 02/25/2013   • Allergic rhinitis 07/10/2012   • Hyperlipidemia 07/10/2012   • Hypertension 07/10/2012   • Nephrolithiasis 07/10/2012     Note Last Updated: 7/27/2018     Overview:   Recurrent, Fall of 2016 (Dr. Muniz)  Had shockwave lithotripsy    Overview:   Recurrent, Fall of 2016 (Dr. Muniz)  Had shockwave lithotripsy     • Obstructive sleep apnea on CPAP 07/10/2012   • Bladder cancer (CMS/[facility]) 07/10/2012     Note Last Updated: 8/26/2019     Overview:    3/2011, Dr. Muniz, TURBT, BCG and infusion of chemo         Past Medical History:   Diagnosis Date   • Anemia    • Basal cell carcinoma 7/10/2012    Overview:  NOSE  Overview:  NOSE   • Basal cell carcinoma of skin 7/10/2012    Overview:  NOSE   • Bladder cancer (CMS/HCC) 7/10/2012    Overview:  3/2011, Dr. Muniz, TURBT, BCG and infusion of chemo  Overview:  3/2011, Dr. 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 Muniz, TURBT, BCG and infusion of chemo   • Melanoma in situ of face (CMS/HCC) 6/14/2016    Overview:  Removed per Dr. Steen (derm) on 6/13/2016   • Obesity    • Renal insufficiency    • Sleep apnea with use of continuous positive airway pressure (CPAP)    • SVT (supraventricular tachycardia) (CMS/HCC)        Past Surgical History:   Procedure Laterality Date   • ADENOIDECTOMY     • APPENDECTOMY  1954   • BLADDER TUMOR EXCISION     • CLAVICLE OPEN REDUCTION INTERNAL FIXATION Right 7/20/2018    Procedure: CLAVICLE OPEN REDUCTION INTERNAL FIXATION;  Surgeon: Cuate Elias MD;  Location: [facility] MAIN OR;  Service: Orthopedics   • COLONOSCOPY N/A 12/12/2017    Procedure: COLONOSCOPY INTO CECUM WITH COLD BX POLYPECTOMY ;  Surgeon: Domingo Cantrell MD;  Location: [facility] ENDOSCOPY;  Service:    • COSMETIC SURGERY      Nose - melanoma removal   • MOHS SURGERY      2 bcc   • TONSILLECTOMY  1951       Family History   Problem Relation Age of Onset   • Heart attack  Mother          age 55   • Heart disease Mother          at age of 54   • Cancer Father          at age 44   • Early death Father          at age of 44   • Diabetes Maternal Grandmother    • Hypertension Paternal Uncle        Social History     Socioeconomic History   • Marital status:      Spouse name: Julita   • Number of children: 1   • Years of education: Not on file   • Highest education level: Not on file   Occupational History   • Occupation: retired president of [employer]   Tobacco Use   • Smoking status: Never Smoker   • Smokeless tobacco: Never Used   • Tobacco comment: no history of smoking   Substance and Sexual Activity   • Alcohol use: Yes     Alcohol/week: 2.0 - 3.0 standard drinks     Types: 6 Cans of beer per week     Comment: 6 pk a week   • Drug use: No   • Sexual activity: Yes     Partners: Female     Birth control/protection: None       Current Outpatient Medications on File Prior to Visit   Medication Sig Dispense Refill   • atorvastatin (LIPITOR) 20 MG tablet TAKE 1 TABLET EVERY NIGHT 90 tablet 3   • carvedilol (COREG) 6.25 MG tablet TAKE 1 TABLET BY MOUTH EVERY 12 HOURS 180 tablet 2   • cyclobenzaprine (FLEXERIL) 10 MG tablet Take 1 tablet by mouth 3 (Three) Times a Day As Needed for Muscle Spasms. 30 tablet 0   • EPINEPHrine (EPIPEN) 0.3 MG/0.3ML solution auto-injector injection      • fexofenadine (ALLEGRA) 180 MG tablet Take 180 mg by mouth Daily.     • NIFEdipine CC (ADALAT CC) 60 MG 24 hr tablet TAKE 1 TABLET BY MOUTH DAILY 90 tablet 2   • raNITIdine (ZANTAC) 300 MG tablet Take 300 mg by mouth Every Night.     • sildenafil (VIAGRA) 100 MG tablet Take 100 mg by mouth As Needed for erectile dysfunction.     • Sulfacetamide Sodium, Acne, 10 % lotion USE AS DIRECTED QD  1     No current facility-administered medications on file prior to visit.        Allergies   Allergen Reactions   • Ace Inhibitors    • Shellfish-Derived Products Hives     Throat swelling, itching        Immunization History   Administered Date(s) Administered   • Fluad Quad 09/10/2019   • Fluzone High Dose =>65 Years ([employer] ONLY) 10/02/2015, 09/23/2016, 10/12/2017, 09/28/2018   • Pneumococcal Conjugate 13-Valent (PCV13) 07/06/2016   • Pneumococcal Polysaccharide (PPSV23) 08/28/2013   • Td, Not Adsorbed 01/01/2002   • Tdap 02/25/2013   • Zostavax 12/18/2009       Objective     There were no vitals taken for this visit.    Physical Exam   Constitutional: He is oriented to person, place, and time. He appears well-developed and well-nourished.   HENT:   Head: Atraumatic.   Neurological: He is alert and oriented to person, place, and time.   Psychiatric: He has a normal mood and affect.       Assessment/Plan   Sachin was seen today for medicare wellness-subsequent.    Diagnoses and all orders for this visit:    Medicare annual wellness visit, subsequent    Essential hypertension    Mixed hyperlipidemia    IFG (impaired fasting glucose)  -     Basic Metabolic Panel  -     Hemoglobin A1c        Discussion    AWV.  See scanned forms and/or computer for adriana history, PHQ-9, functional ability questionnaire, cognitive impairment screening.  Direct observation of cognitive abilities:  The patient does not exhibit any impairment in cognitive abilities upon direct observation at today's visit.     These were all reviewed with the patient and the patient was provided with a personal prevention plan of service in patient instructions.  Patient was given advice or information on the following topics:  nutrition, exercise, weight management.    HTN.  The patient will continue current regimen.      HLD.  The patient will continue current regimen.      IFG.  We discussed diet.  I recommend a diet high in fruits, vegetables, whole grains, lean meats, nuts and beans.  I recommend limiting red meat, full fat dairy, eggs and processed white carbohydrates.  I recommend aerobic exercise at least 3 days per week.   I have  recommended that the patient get the following immunizations:  Shingrix.    F/u six weeks with A1c and BMP.          Health Maintenance   Topic Date Due   • ZOSTER VACCINE (2 of 3) 02/12/2010   • MEDICARE ANNUAL WELLNESS  05/09/2020   • INFLUENZA VACCINE  08/01/2020   • LIPID PANEL  05/14/2021   • COLONOSCOPY  12/12/2022   • TDAP/TD VACCINES (2 - Td) 02/25/2023   • PNEUMOCOCCAL VACCINE (65+ HIGH RISK)  Completed   • HEPATITIS C SCREENING  Completed            Future Appointments   Date Time Provider Department Center   7/6/2020 12:20 PM Shorty Zaidi MD [facility] None

## 2020-05-18 NOTE — PATIENT INSTRUCTIONS
Medicare Wellness  Personal Prevention Plan of Service     Date of Office Visit:  2020  Encounter Provider:  Yvonne Chacko MD  Place of Service:  [facility]  Patient Name: Sachin Almanzar  :  1950    As part of the Medicare Wellness portion of your visit today, we are providing you with this personalized preventive plan of services (PPPS). This plan is based upon recommendations of the [place] Preventive Services Task Force (USPSTF) and the Advisory Committee on Immunization Practices (ACIP).    This lists the preventive care services that should be considered, and provides dates of when you are due. Items listed as completed are up-to-date and do not require any further intervention.    Health Maintenance   Topic Date Due   • ZOSTER VACCINE (2 of 3) 2010   • MEDICARE ANNUAL WELLNESS  2020   • INFLUENZA VACCINE  2020   • LIPID PANEL  2021   • COLONOSCOPY  2022   • TDAP/TD VACCINES (2 - Td) 2023   • PNEUMOCOCCAL VACCINE (65+ HIGH RISK)  Completed   • HEPATITIS C SCREENING  Completed       Orders Placed This Encounter   Procedures   • Basic Metabolic Panel   • Hemoglobin A1c       Return in about 6 months (around 2020) for Recheck.

## 2020-06-23 DIAGNOSIS — R73.9 HYPERGLYCEMIA: ICD-10-CM

## 2020-06-23 DIAGNOSIS — Z12.5 ENCOUNTER FOR PROSTATE CANCER SCREENING: Primary | ICD-10-CM

## 2020-06-24 ENCOUNTER — LAB (OUTPATIENT)
Dept: LAB | Facility: HOSPITAL | Age: 70
End: 2020-06-24

## 2020-06-24 LAB
ANION GAP SERPL CALCULATED.3IONS-SCNC: 9.7 MMOL/L (ref 5–15)
BUN BLD-MCNC: 19 MG/DL (ref 8–23)
BUN/CREAT SERPL: 13.5 (ref 7–25)
CALCIUM SPEC-SCNC: 9.2 MG/DL (ref 8.6–10.5)
CHLORIDE SERPL-SCNC: 102 MMOL/L (ref 98–107)
CO2 SERPL-SCNC: 22.3 MMOL/L (ref 22–29)
CREAT BLD-MCNC: 1.41 MG/DL (ref 0.76–1.27)
GFR SERPL CREATININE-BSD FRML MDRD: 50 ML/MIN/1.73
GLUCOSE BLD-MCNC: 125 MG/DL (ref 65–99)
HBA1C MFR BLD: 5.6 % (ref 4.8–5.6)
POTASSIUM BLD-SCNC: 4.4 MMOL/L (ref 3.5–5.2)
PSA SERPL-MCNC: 0.63 NG/ML (ref 0–4)
SODIUM BLD-SCNC: 134 MMOL/L (ref 136–145)

## 2020-06-24 PROCEDURE — 80048 BASIC METABOLIC PNL TOTAL CA: CPT | Performed by: INTERNAL MEDICINE

## 2020-06-24 PROCEDURE — G0103 PSA SCREENING: HCPCS | Performed by: INTERNAL MEDICINE

## 2020-06-24 PROCEDURE — 36415 COLL VENOUS BLD VENIPUNCTURE: CPT | Performed by: INTERNAL MEDICINE

## 2020-06-24 PROCEDURE — 83036 HEMOGLOBIN GLYCOSYLATED A1C: CPT | Performed by: INTERNAL MEDICINE

## 2020-07-01 ENCOUNTER — OFFICE VISIT (OUTPATIENT)
Dept: INTERNAL MEDICINE | Facility: CLINIC | Age: 70
End: 2020-07-01

## 2020-07-01 VITALS
BODY MASS INDEX: 34.36 KG/M2 | OXYGEN SATURATION: 97 % | WEIGHT: 240 LBS | HEART RATE: 78 BPM | TEMPERATURE: 97.2 F | HEIGHT: 70 IN | DIASTOLIC BLOOD PRESSURE: 78 MMHG | SYSTOLIC BLOOD PRESSURE: 130 MMHG

## 2020-07-01 DIAGNOSIS — R73.03 PREDIABETES: Primary | ICD-10-CM

## 2020-07-01 DIAGNOSIS — N18.30 STAGE 3 CHRONIC KIDNEY DISEASE (HCC): ICD-10-CM

## 2020-07-01 PROCEDURE — 99213 OFFICE O/P EST LOW 20 MIN: CPT | Performed by: INTERNAL MEDICINE

## 2020-07-01 NOTE — PROGRESS NOTES
Subjective     Sachin Almanzar is a 69 y.o. male who presents with   Chief Complaint   Patient presents with   • Follow-up     labs concerned with a1c   • Chronic Kidney Disease   • Hyperglycemia       History of Present Illness     Hyperglycemia.  Reviewed numbers.  He is prediabetic.    CKD-3.  Numbers are stable.      Review of Systems   Respiratory: Negative.    Cardiovascular: Negative.        The following portions of the patient's history were reviewed and updated as appropriate: allergies, current medications and problem list.    Patient Active Problem List    Diagnosis Date Noted   • Stage 3 chronic kidney disease (CMS/[facility]) 01/10/2019   • Multiple renal cysts 05/07/2018   • Allergy to shellfish 04/19/2017   • Venous insufficiency of both lower extremities 04/21/2016   • Erectile dysfunction 02/25/2013   • Gastroesophageal reflux disease 02/25/2013   • Allergic rhinitis 07/10/2012   • Hyperlipidemia 07/10/2012   • Hypertension 07/10/2012   • Nephrolithiasis 07/10/2012     Note Last Updated: 7/27/2018     Overview:   Recurrent, Fall of 2016 (Dr. Muniz)  Had shockwave lithotripsy    Overview:   Recurrent, Fall of 2016 (Dr. Muniz)  Had shockwave lithotripsy     • Obstructive sleep apnea on CPAP 07/10/2012   • Bladder cancer (CMS/[facility]) 07/10/2012     Note Last Updated: 8/26/2019     Overview:   3/2011, Dr. Muniz, TURBT, BCG and infusion of chemo         Current Outpatient Medications on File Prior to Visit   Medication Sig Dispense Refill   • atorvastatin (LIPITOR) 20 MG tablet TAKE 1 TABLET EVERY NIGHT 90 tablet 3   • carvedilol (COREG) 6.25 MG tablet TAKE 1 TABLET BY MOUTH EVERY 12 HOURS 180 tablet 2   • fexofenadine (ALLEGRA) 180 MG tablet Take 180 mg by mouth Daily.     • NIFEdipine CC (ADALAT CC) 60 MG 24 hr tablet TAKE 1 TABLET BY MOUTH DAILY 90 tablet 2   • sildenafil (VIAGRA) 100 MG tablet Take 100 mg by mouth As Needed for erectile dysfunction.     • EPINEPHrine (EPIPEN) 0.3 MG/0.3ML solution  "auto-injector injection        No current facility-administered medications on file prior to visit.        Objective     /78 (BP Location: Left arm, Patient Position: Sitting, Cuff Size: Adult)   Pulse 78   Temp 97.2 °F (36.2 °C)   Ht 177.8 cm (70\")   Wt 109 kg (240 lb)   SpO2 97%   BMI 34.44 kg/m²     Physical Exam   Constitutional: He is oriented to person, place, and time. He appears well-developed and well-nourished.   HENT:   Head: Atraumatic.   Neurological: He is alert and oriented to person, place, and time.   Psychiatric: He has a normal mood and affect.       Assessment/Plan   Sachin was seen today for follow-up, chronic kidney disease and hyperglycemia.    Diagnoses and all orders for this visit:    Prediabetes    Stage 3 chronic kidney disease (CMS/HCC)        Discussion    Prediabetes.  I recommend a diet high in fruits, vegetables, whole grains, lean meats, nuts and beans.  I recommend limiting red meat, full fat dairy, eggs and processed white carbohydrates.  I recommend aerobic exercise at least 3 days per week.  CKD-3.   We discussed the importance of NSAID avoidance.  We discussed the importance of water consumption.          Future Appointments   Date Time Provider Department Center   7/6/2020 12:20 PM Shorty Zaidi MD [facility] None   11/17/2020 10:20 AM [facility] None   11/24/2020  9:00 AM Yvonne Chacko MD [facility] None         "

## 2020-07-06 ENCOUNTER — OFFICE VISIT (OUTPATIENT)
Dept: CARDIOLOGY | Facility: CLINIC | Age: 70
End: 2020-07-06

## 2020-07-06 VITALS
HEART RATE: 75 BPM | BODY MASS INDEX: 34.5 KG/M2 | HEIGHT: 70 IN | WEIGHT: 241 LBS | SYSTOLIC BLOOD PRESSURE: 112 MMHG | DIASTOLIC BLOOD PRESSURE: 62 MMHG

## 2020-07-06 DIAGNOSIS — E78.2 MIXED HYPERLIPIDEMIA: Primary | ICD-10-CM

## 2020-07-06 DIAGNOSIS — I10 ESSENTIAL HYPERTENSION: ICD-10-CM

## 2020-07-06 PROCEDURE — 93000 ELECTROCARDIOGRAM COMPLETE: CPT | Performed by: INTERNAL MEDICINE

## 2020-07-06 PROCEDURE — 99214 OFFICE O/P EST MOD 30 MIN: CPT | Performed by: INTERNAL MEDICINE

## 2020-07-06 RX ORDER — UREA 10 %
1 LOTION (ML) TOPICAL NIGHTLY PRN
COMMUNITY

## 2020-07-06 RX ORDER — ASPIRIN 81 MG/1
81 TABLET ORAL DAILY
COMMUNITY
End: 2020-11-24

## 2020-07-06 RX ORDER — RANITIDINE 300 MG/1
300 TABLET ORAL NIGHTLY
COMMUNITY
End: 2020-11-24

## 2020-07-06 NOTE — PROGRESS NOTES
Date of Office Visit: 20  Encounter Provider: Shorty Zaidi MD  Place of Service: [facility] CARDIOLOGY  Patient Name: Sachin Almanzar  :1950    Chief Complaint   Patient presents with   • Follow-up     1 year   • Palpitations   :     HPI:  69 y.o. male, new to me, who presents today for follow-up.  Old records have been obtained and reviewed by me. He is a patient with a past cardiac history significant for SVT approximately 10 years ago.  In 2018, he presented to the emergency room after a syncopal episode.  This was a johnnie syncopal episode without any prodromal symptoms.  Troponin was negative.  The d-dimer was slightly elevated and a CTA of the chest was negative for pulmonary embolus.  He was seen by Dr. Zaidi for possible cardiac work-up for his syncopal episode.  He had an echocardiogram which revealed normal LV function and no valvular abnormalities.  He had no evidence of ventricular arrhythmia or high-grade AV block.  His blood alcohol level upon ER presentation was 0.12 and he did admit to having a few beers.  No further cardiac work-up was warranted and he was advised to follow-up in 4 weeks.  He was last seen in the office on 2018 at which time he was doing well with no complaints of angina or heart failure.  He did report some fatigue which interestingly enough, had been present for several weeks prior to his syncopal episode.  His PCP failed his kidney function was a little decreased and he stopped taking his NSAIDs.  He does have a strong family history of coronary disease with a mother who had a heart attack at age 55.  On 2018, he underwent a treadmill stress test which revealed no ECG or clinical evidence of ischemia.  He also wore a 14-day Holter monitor which was a normal study.  I am seeing him today for follow-up.     Since our last visit he has been doing very well.  He denies any chest pain or dyspnea on exertion.  He  has had no sustained tachycardia episodes.  He is tolerating his current medical regimen well.  He does state that occasionally he will have a low normal blood pressure with a systolic of 100-110 mmHg; however, this is not commonplace.          Past Medical History:   Diagnosis Date   • Anemia    • Basal cell carcinoma 7/10/2012    Overview:  NOSE  Overview:  NOSE   • Basal cell carcinoma of skin 7/10/2012    Overview:  NOSE   • Bladder cancer (CMS/HCC) 7/10/2012    Overview:  3/2011, Dr. Muniz, TURBT, BCG and infusion of chemo  Overview:  3/2011, Dr. 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 Muniz, TURBT, BCG and infusion of chemo   • Melanoma in situ of face (CMS/HCC) 6/14/2016    Overview:  Removed per Dr. Steen (derm) on 6/13/2016   • Obesity    • Renal insufficiency    • Sleep apnea with use of continuous positive airway pressure (CPAP)    • SVT (supraventricular tachycardia) (CMS/HCC)        Past Surgical History:   Procedure Laterality Date   • ADENOIDECTOMY     • APPENDECTOMY  1954   • BLADDER TUMOR EXCISION     • CLAVICLE OPEN REDUCTION INTERNAL FIXATION Right 7/20/2018    Procedure: CLAVICLE OPEN REDUCTION INTERNAL FIXATION;  Surgeon: Cuate Elias MD;  Location: [facility] OR;  Service: Orthopedics   • COLONOSCOPY N/A 12/12/2017    Procedure: COLONOSCOPY INTO CECUM WITH COLD BX POLYPECTOMY ;  Surgeon: Domingo Cantrell MD;  Location: [facility] ENDOSCOPY;   Service:    • COSMETIC SURGERY      Nose - melanoma removal   • MOHS SURGERY      2 bcc   • TONSILLECTOMY         Social History     Socioeconomic History   • Marital status:      Spouse name: Julita   • Number of children: 1   • Years of education: Not on file   • Highest education level: Not on file   Occupational History   • Occupation: retired president of [employer]   Tobacco Use   • Smoking status: Never Smoker   • Smokeless tobacco: Never Used   • Tobacco comment: no history of smoking   Substance and Sexual Activity   • Alcohol use: Yes     Alcohol/week: 2.0 - 3.0 standard drinks     Types: 6 Cans of beer per week     Comment: 6 pk a week   • Drug use: No   • Sexual activity: Yes     Partners: Female     Birth control/protection: None       Family History   Problem Relation Age of Onset   • Heart attack Mother          age 55   • Heart disease Mother          at age of 54   • Cancer Father          at age 44   • Early death Father          at age of 44   • Diabetes Maternal Grandmother    • Hypertension Paternal Uncle        Review of Systems   Constitution: Negative for chills, fever and malaise/fatigue.   HENT: Negative for nosebleeds and sore throat.    Eyes: Negative for blurred vision and double vision.   Cardiovascular: Positive for leg swelling and palpitations. Negative for chest pain, claudication, dyspnea on exertion, near-syncope, orthopnea, paroxysmal nocturnal dyspnea and syncope.   Respiratory: Negative for cough, shortness of breath and snoring.    Endocrine: Negative for cold intolerance, heat intolerance and polydipsia.   Skin: Negative for itching, poor wound healing and rash.   Musculoskeletal: Negative for joint pain, joint swelling, muscle weakness and myalgias.   Gastrointestinal: Negative for abdominal pain, diarrhea, melena, nausea and vomiting.   Genitourinary: Negative for frequency and hematuria.   Neurological: Negative for light-headedness, loss of balance,  "numbness, paresthesias, seizures, vertigo and weakness.   Psychiatric/Behavioral: Negative for altered mental status and depression.   Allergic/Immunologic: Negative.    All other systems reviewed and are negative.      Allergies   Allergen Reactions   • Ace Inhibitors    • Shellfish-Derived Products Hives     Throat swelling, itching         Current Outpatient Medications:   •  aspirin 81 MG EC tablet, Take 81 mg by mouth Daily., Disp: , Rfl:   •  atorvastatin (LIPITOR) 20 MG tablet, TAKE 1 TABLET EVERY NIGHT, Disp: 90 tablet, Rfl: 3  •  carvedilol (COREG) 6.25 MG tablet, TAKE 1 TABLET BY MOUTH EVERY 12 HOURS, Disp: 180 tablet, Rfl: 2  •  EPINEPHrine (EPIPEN) 0.3 MG/0.3ML solution auto-injector injection, , Disp: , Rfl:   •  fexofenadine (ALLEGRA) 180 MG tablet, Take 180 mg by mouth Daily., Disp: , Rfl:   •  melatonin 1 MG tablet, Take 1 mg by mouth At Night As Needed for Sleep., Disp: , Rfl:   •  NIFEdipine CC (ADALAT CC) 60 MG 24 hr tablet, TAKE 1 TABLET BY MOUTH DAILY, Disp: 90 tablet, Rfl: 2  •  raNITIdine (ZANTAC) 300 MG tablet, Take 300 mg by mouth Every Night., Disp: , Rfl:   •  sildenafil (VIAGRA) 100 MG tablet, Take 100 mg by mouth As Needed for erectile dysfunction., Disp: , Rfl:       Objective:     Vitals:    07/06/20 1225   BP: 112/62   Pulse: 75   Weight: 109 kg (241 lb)   Height: 177.8 cm (70\")     Body mass index is 34.58 kg/m².    PHYSICAL EXAM:    Physical Exam   Constitutional: He is oriented to person, place, and time. He appears well-developed and well-nourished. No distress.   HENT:   Head: Normocephalic and atraumatic.   Eyes: Pupils are equal, round, and reactive to light.   Neck: No JVD present. No thyromegaly present.   Cardiovascular: Normal rate, regular rhythm, normal heart sounds and intact distal pulses.   No murmur heard.  Pulmonary/Chest: Effort normal and breath sounds normal. No respiratory distress.   Abdominal: Soft. Bowel sounds are normal. He exhibits no distension. There is " no splenomegaly or hepatomegaly. There is no tenderness.   Musculoskeletal: Normal range of motion. He exhibits no edema.   Neurological: He is alert and oriented to person, place, and time.   Skin: Skin is warm and dry. He is not diaphoretic. No erythema.   Psychiatric: He has a normal mood and affect. His behavior is normal. Judgment normal.         ECG 12 Lead  Date/Time: 7/6/2020 1:10 PM  Performed by: Shorty Zaidi MD  Authorized by: Shorty Zaidi MD   Comparison: compared with previous ECG from 6/24/2019  Similar to previous ECG  Rhythm: sinus rhythm  Rate: normal  QRS axis: normal    Clinical impression: normal ECG              Assessment:        Plan:         Very pleasant 69-year-old male with a medical history of an isolated episode of SVT, essential hypertension and hyperlipidemia who presents to me in followup.  He is doing very well.  He denies any chest pain or dyspnea on exertion.  He has well controlled blood pressure and heart rate.      1. Essential hypertension; blood pressure is well controlled.  Occasionally he will have a low normal blood pressure but had not had any issues with significant lightheadedness or syncope.  He call me if he continues to run with low blood pressures of 100-110 mmHg systolic and if so, we will decrease his Nifedipine to 30 mg daily.    2. Hyperlipidemia; continue current regimen.  No changes indicated at this point in time.      I will see the patient back in one year unless he has problems sooner.

## 2020-09-14 ENCOUNTER — FLU SHOT (OUTPATIENT)
Dept: INTERNAL MEDICINE | Facility: CLINIC | Age: 70
End: 2020-09-14

## 2020-09-14 DIAGNOSIS — Z23 NEED FOR VACCINATION: Primary | ICD-10-CM

## 2020-09-14 PROCEDURE — G0008 ADMIN INFLUENZA VIRUS VAC: HCPCS | Performed by: INTERNAL MEDICINE

## 2020-09-14 PROCEDURE — 90653 IIV ADJUVANT VACCINE IM: CPT | Performed by: INTERNAL MEDICINE

## 2020-09-21 RX ORDER — NIFEDIPINE 60 MG/1
60 TABLET, FILM COATED, EXTENDED RELEASE ORAL
Qty: 90 TABLET | Refills: 2 | Status: SHIPPED | OUTPATIENT
Start: 2020-09-21 | End: 2020-09-22 | Stop reason: SDUPTHER

## 2020-09-21 RX ORDER — CARVEDILOL 6.25 MG/1
TABLET ORAL
Qty: 180 TABLET | Refills: 2 | Status: SHIPPED | OUTPATIENT
Start: 2020-09-21 | End: 2020-09-22 | Stop reason: SDUPTHER

## 2020-09-22 RX ORDER — NIFEDIPINE 60 MG/1
60 TABLET, FILM COATED, EXTENDED RELEASE ORAL
Qty: 90 TABLET | Refills: 2 | Status: SHIPPED | OUTPATIENT
Start: 2020-09-22 | End: 2021-06-14

## 2020-09-22 RX ORDER — CARVEDILOL 6.25 MG/1
6.25 TABLET ORAL EVERY 12 HOURS
Qty: 180 TABLET | Refills: 2 | Status: SHIPPED | OUTPATIENT
Start: 2020-09-22 | End: 2021-06-14

## 2020-11-16 DIAGNOSIS — E78.2 MIXED HYPERLIPIDEMIA: Primary | ICD-10-CM

## 2020-11-17 LAB
ALBUMIN SERPL-MCNC: 4.1 G/DL (ref 3.5–5.2)
ALBUMIN/GLOB SERPL: 1.7 G/DL
ALP SERPL-CCNC: 69 U/L (ref 39–117)
ALT SERPL-CCNC: 34 U/L (ref 1–41)
AST SERPL-CCNC: 25 U/L (ref 1–40)
BILIRUB SERPL-MCNC: 0.7 MG/DL (ref 0–1.2)
BUN SERPL-MCNC: 19 MG/DL (ref 8–23)
BUN/CREAT SERPL: 14.3 (ref 7–25)
CALCIUM SERPL-MCNC: 9.1 MG/DL (ref 8.6–10.5)
CHLORIDE SERPL-SCNC: 102 MMOL/L (ref 98–107)
CHOLEST SERPL-MCNC: 116 MG/DL (ref 0–200)
CO2 SERPL-SCNC: 26.3 MMOL/L (ref 22–29)
CREAT SERPL-MCNC: 1.33 MG/DL (ref 0.76–1.27)
GLOBULIN SER CALC-MCNC: 2.4 GM/DL
GLUCOSE SERPL-MCNC: 115 MG/DL (ref 65–99)
HDLC SERPL-MCNC: 34 MG/DL (ref 40–60)
LDLC SERPL CALC-MCNC: 48 MG/DL (ref 0–100)
POTASSIUM SERPL-SCNC: 4.4 MMOL/L (ref 3.5–5.2)
PROT SERPL-MCNC: 6.5 G/DL (ref 6–8.5)
SODIUM SERPL-SCNC: 138 MMOL/L (ref 136–145)
TRIGL SERPL-MCNC: 211 MG/DL (ref 0–150)
VLDLC SERPL CALC-MCNC: 34 MG/DL (ref 5–40)

## 2020-11-24 ENCOUNTER — OFFICE VISIT (OUTPATIENT)
Dept: INTERNAL MEDICINE | Facility: CLINIC | Age: 70
End: 2020-11-24

## 2020-11-24 VITALS
HEART RATE: 83 BPM | BODY MASS INDEX: 34.36 KG/M2 | OXYGEN SATURATION: 99 % | DIASTOLIC BLOOD PRESSURE: 70 MMHG | SYSTOLIC BLOOD PRESSURE: 110 MMHG | HEIGHT: 70 IN | WEIGHT: 240 LBS

## 2020-11-24 DIAGNOSIS — F34.1 DYSTHYMIA: ICD-10-CM

## 2020-11-24 DIAGNOSIS — E78.2 MIXED HYPERLIPIDEMIA: ICD-10-CM

## 2020-11-24 DIAGNOSIS — N18.30 STAGE 3 CHRONIC KIDNEY DISEASE, UNSPECIFIED WHETHER STAGE 3A OR 3B CKD (HCC): ICD-10-CM

## 2020-11-24 DIAGNOSIS — I10 ESSENTIAL HYPERTENSION: Primary | ICD-10-CM

## 2020-11-24 PROCEDURE — 99214 OFFICE O/P EST MOD 30 MIN: CPT | Performed by: INTERNAL MEDICINE

## 2020-11-24 RX ORDER — FAMOTIDINE 20 MG/1
20 TABLET, FILM COATED ORAL DAILY
COMMUNITY

## 2020-11-24 NOTE — PROGRESS NOTES
Subjective     Sachin Almanzar is a 70 y.o. male who presents with   Chief Complaint   Patient presents with   • Hypertension   • Hyperlipidemia   • Chronic Kidney Disease       History of Present Illness     HTN.  Control is excellent.   Control is good on atorvastatin.   CKD-3.  Reviewed numbers which are stable.     Feeling some sadness and  lack of fulfillment during the pandemic.  He is interested in counseling.      Review of Systems   Constitutional: Negative for fever.   Respiratory: Negative.    Cardiovascular: Negative.        The following portions of the patient's history were reviewed and updated as appropriate: allergies, current medications and problem list.    Patient Active Problem List    Diagnosis Date Noted   • Stage 3 chronic kidney disease 01/10/2019   • Multiple renal cysts 05/07/2018   • Allergy to shellfish 04/19/2017   • Venous insufficiency of both lower extremities 04/21/2016   • Erectile dysfunction 02/25/2013   • Gastroesophageal reflux disease 02/25/2013   • Allergic rhinitis 07/10/2012   • Hyperlipidemia 07/10/2012   • Hypertension 07/10/2012   • Nephrolithiasis 07/10/2012     Note Last Updated: 7/27/2018     Overview:   Recurrent, Fall of 2016 (Dr. Muniz)  Had shockwave lithotripsy    Overview:   Recurrent, Fall of 2016 (Dr. Muniz)  Had shockwave lithotripsy     • Obstructive sleep apnea on CPAP 07/10/2012   • History of bladder cancer 07/10/2012     Note Last Updated: 8/26/2019     Overview:   3/2011, Dr. Muniz, TURBT, BCG and infusion of chemo         Current Outpatient Medications on File Prior to Visit   Medication Sig Dispense Refill   • atorvastatin (LIPITOR) 20 MG tablet TAKE 1 TABLET EVERY NIGHT 90 tablet 3   • carvedilol (COREG) 6.25 MG tablet Take 1 tablet by mouth Every 12 (Twelve) Hours. 180 tablet 2   • EPINEPHrine (EPIPEN) 0.3 MG/0.3ML solution auto-injector injection      • famotidine (PEPCID) 20 MG tablet Take 20 mg by mouth Daily.     • fexofenadine (ALLEGRA)  "180 MG tablet Take 180 mg by mouth Daily.     • melatonin 1 MG tablet Take 1 mg by mouth At Night As Needed for Sleep.     • NIFEdipine CC (ADALAT CC) 60 MG 24 hr tablet Take 1 tablet by mouth Daily. 90 tablet 2   • sildenafil (VIAGRA) 100 MG tablet Take 100 mg by mouth As Needed for erectile dysfunction.     • [DISCONTINUED] aspirin 81 MG EC tablet Take 81 mg by mouth Daily.     • [DISCONTINUED] raNITIdine (ZANTAC) 300 MG tablet Take 300 mg by mouth Every Night.       No current facility-administered medications on file prior to visit.        Objective     /70   Pulse 83   Ht 177.8 cm (70\")   Wt 109 kg (240 lb)   SpO2 99%   BMI 34.44 kg/m²     Physical Exam  Constitutional:       Appearance: He is well-developed.   HENT:      Head: Atraumatic.   Cardiovascular:      Rate and Rhythm: Normal rate and regular rhythm.      Heart sounds: Normal heart sounds.   Pulmonary:      Effort: Pulmonary effort is normal.      Breath sounds: Normal breath sounds.   Skin:     General: Skin is warm and dry.   Neurological:      Mental Status: He is alert and oriented to person, place, and time.         Assessment/Plan   Diagnoses and all orders for this visit:    1. Essential hypertension (Primary)    2. Mixed hyperlipidemia    3. Stage 3 chronic kidney disease, unspecified whether stage 3a or 3b CKD    4. Dysthymia        Discussion    HTN.  The patient will continue current regimen.      HLD.  The patient will continue current regimen.      CKD-3.   We discussed the importance of NSAID avoidance.  We discussed the importance of water consumption.  We discussed avoiding constrast studies like CTs whenever possible.    Dysthymia.  List of counselors provided.  I also recommended for him to get Shingrix.           Future Appointments   Date Time Provider Department Center   5/24/2021 10:00 AM [facility]   5/28/2021 10:00 AM Yvonne Chacko MD [facility]   7/12/2021 12:20 PM Dyan" Shorty JALLOH MD [facility] None

## 2020-11-24 NOTE — PATIENT INSTRUCTIONS
Rick Maradiaga, Ph.D.  [facility] [address]  [address] [place] [address]  [phone]    Yanet Jones, Ph.D.  [address] [facility] [phone]    Jessica Vick, Ph. D.   [address] [facility] [address]  [phone]    [facility]  [address] [place] [address]  [phone]    Aakash Cox, Ph.D.  [address] [place] [address]  [phone]    Vesta Burciaga, Ph.D.  [address] [place] [address]  [phone]    Jonnie Borges, Psy.D.  [address] [place] [address] [address]  [place] [address]  [phone]    Dr.Elizabeth Schaefer  [phone]    Sumanth Glover, Psy. D.  [address]   [place] [address]  [phone]    Clay Schneider, Psy.D. (For children and Family)  [address] [facility]  [facility], [address]  [phone]    Lizzie Scott Psy.D.   [address] [facility], [address]  [phone]    Amelia Bond, Psy.D.  [address] [facility] [address]  [place] [address]  [phone]    Patti Mason  [address] [facility] [address]   [phone]    Nikki Brown, Ph.D.   [address] [place] [address]  [phone]  For patients with history of brain injury, stoke, spinal cord, parkinson’s, etc…where the focus of treatment pertains to their medical diagnosis. Dr.Libby Khan also has a great deal of experience working with chronic pain patients.     Lilly Mason  Licensed Psychologist  [facility]  [address] [place] [address]  [phone]

## 2021-03-09 DIAGNOSIS — Z23 IMMUNIZATION DUE: ICD-10-CM

## 2021-03-11 ENCOUNTER — TELEPHONE (OUTPATIENT)
Dept: CARDIOLOGY | Facility: CLINIC | Age: 71
End: 2021-03-11

## 2021-03-11 NOTE — TELEPHONE ENCOUNTER
Pt is calling in to let you know that yesterday he had a rapid heart rate for a little over an hour.  He said his heart rate got up to 180.  He has a hx of SVT, but hasn't had an episode for years.    His heart rate is 80 now and he is asymptomatic.  I have scheduled him for further evaluation tomorrow with you.  He is to go to the ER if this returns or his symptoms worsen prior to his apt time.  Thanks  Hoa Lance RN  Triage nurse

## 2021-03-12 ENCOUNTER — OFFICE VISIT (OUTPATIENT)
Dept: CARDIOLOGY | Facility: CLINIC | Age: 71
End: 2021-03-12

## 2021-03-12 VITALS
BODY MASS INDEX: 35.79 KG/M2 | SYSTOLIC BLOOD PRESSURE: 124 MMHG | HEIGHT: 70 IN | DIASTOLIC BLOOD PRESSURE: 72 MMHG | WEIGHT: 250 LBS | OXYGEN SATURATION: 95 % | RESPIRATION RATE: 18 BRPM | HEART RATE: 82 BPM

## 2021-03-12 DIAGNOSIS — R00.0 RAPID HEART RATE: ICD-10-CM

## 2021-03-12 DIAGNOSIS — E78.2 MIXED HYPERLIPIDEMIA: ICD-10-CM

## 2021-03-12 DIAGNOSIS — I10 ESSENTIAL HYPERTENSION: Primary | ICD-10-CM

## 2021-03-12 PROCEDURE — 93000 ELECTROCARDIOGRAM COMPLETE: CPT | Performed by: NURSE PRACTITIONER

## 2021-03-12 PROCEDURE — 99214 OFFICE O/P EST MOD 30 MIN: CPT | Performed by: NURSE PRACTITIONER

## 2021-03-12 NOTE — PROGRESS NOTES
Date of Office Visit: 2021  Encounter Provider: GÉNESIS Yen  Place of Service: [facility] CARDIOLOGY  Patient Name: Sachin Almanzar  :1950    Chief Complaint   Patient presents with   • Rapid Heart Rate     FOLLOW UP   :     HPI: Sachin Almanzar is a 70 y.o. male who presents today for follow-up.  Old records have been obtained and reviewed by me.  He is a patient of Dr. Zaidi's with a past cardiac history significant for hypertension, hyperlipidemia, and a distant history of SVT.  In 2018 he experienced a syncopal episode which led to a pretty extensive cardiac work-up.  Echocardiogram revealed normal LV systolic function and no significant valvular abnormalities.  He had a treadmill stress test no evidence of ischemia.  He also wore a Holter monitor which was relatively benign revealing only rare atrial ectopic beats.   He was last seen in the office by Dr. Zaidi in 2020 at which time he was doing well with no complaints of angina or heart failure.  No changes were made to his medical regimen, and he was advised to follow-up in 1 year.   He called the office yesterday to report an episode of a rapid heart rate the day before.  He was scheduled to see me today.   Two days ago, he was working on some projects around the house.  He began feeling anxious and noticed his heart rate felt rapid.  He checked his pulse and reports it was 180.  It gradually normalized over the course of 30 minutes.  His blood pressure was stable the entire time.  He felt a slight flutter at first but denied any other associated symptoms.  He denies any chest pain, shortness of breath, edema, dizziness, or syncope.  He has gained about 20 pounds during the pandemic.  He does try to take a couple of walks a week.    Past Medical History:   Diagnosis Date   • Anemia    • Basal cell carcinoma 7/10/2012    Overview:  NOSE  Overview:  NOSE   • Basal cell carcinoma of skin  7/10/2012    Overview:  NOSE   • Bladder cancer (CMS/HCC) 7/10/2012    Overview:  3/2011, Dr. Muniz, TURBT, BCG and infusion of chemo  Overview:  3/2011, Dr. Muniz, TURBT, BCG and infusion of chemo   • Chronic kidney disease (CKD), stage II (mild)    • Coronary artery disease episode of SVT in 2001; heart palpitations    heart palpations are current issue   • Erectile dysfunction 2012   • Gastric ulcer    • GERD (gastroesophageal reflux disease)    • [place]'s disease 7/10/2012    Overview:  Transient acantholytic dermatosis (trunk)  Overview:  Transient acantholytic dermatosis (trunk)   • Hyperlipidemia    • Hypertension    • Kidney stones    • Low back pain    • Malignant neoplasm of urinary bladder (CMS/HCC) 7/10/2012    Overview:  3/2011, Dr. Muniz, TURBT, BCG and infusion of chemo   • Melanoma in situ of face (CMS/HCC) 6/14/2016    Overview:  Removed per Dr. Steen (derm) on 6/13/2016   • Obesity    • Renal insufficiency    • Sleep apnea with use of continuous positive airway pressure (CPAP)    • SVT (supraventricular tachycardia) (CMS/HCC)        Past Surgical History:   Procedure Laterality Date   • ADENOIDECTOMY     • APPENDECTOMY  1954   • BLADDER TUMOR EXCISION     • CLAVICLE OPEN REDUCTION INTERNAL FIXATION Right 7/20/2018    Procedure: CLAVICLE OPEN REDUCTION INTERNAL FIXATION;  Surgeon: Cuate Elias MD;  Location: [facility] OR;  Service: Orthopedics   • COLONOSCOPY N/A 12/12/2017    Procedure: COLONOSCOPY INTO CECUM WITH COLD BX POLYPECTOMY ;  Surgeon: Domingo Cantrell MD;  Location: [facility] ENDOSCOPY;  Service:    • COSMETIC SURGERY      Nose - melanoma removal   • MOHS SURGERY      2 bcc   • TONSILLECTOMY  1951       Social History     Socioeconomic History   • Marital status:      Spouse name: Julita   • Number of children: 1   • Years of education: Not on file   • Highest education level: Not on file   Tobacco Use   • Smoking status: Never Smoker   • Smokeless tobacco: Never Used   •  Tobacco comment: CAFFEINE USE: 1 CUP COFFEE DAILY   Vaping Use   • Vaping Use: Never used   Substance and Sexual Activity   • Alcohol use: Yes     Alcohol/week: 2.0 - 3.0 standard drinks     Types: 6 Cans of beer per week     Comment: 6 pk a week   • Drug use: No   • Sexual activity: Yes     Partners: Female     Birth control/protection: None       Family History   Problem Relation Age of Onset   • Heart attack Mother          age 55   • Heart disease Mother          at age of 54   • Cancer Father          at age 44   • Early death Father          at age of 44   • Diabetes Maternal Grandmother    • Hypertension Paternal Uncle        Review of Systems   Constitutional: Negative.   Cardiovascular: Positive for palpitations. Negative for chest pain, dyspnea on exertion, leg swelling, orthopnea, paroxysmal nocturnal dyspnea and syncope.   Respiratory: Negative.    Hematologic/Lymphatic: Negative for bleeding problem.   Musculoskeletal: Negative for falls.   Gastrointestinal: Negative for melena.   Neurological: Negative for dizziness and light-headedness.       Allergies   Allergen Reactions   • Ace Inhibitors    • Shellfish-Derived Products Hives     Throat swelling, itching         Current Outpatient Medications:   •  atorvastatin (LIPITOR) 20 MG tablet, TAKE 1 TABLET EVERY NIGHT, Disp: 90 tablet, Rfl: 3  •  carvedilol (COREG) 6.25 MG tablet, Take 1 tablet by mouth Every 12 (Twelve) Hours., Disp: 180 tablet, Rfl: 2  •  EPINEPHrine (EPIPEN) 0.3 MG/0.3ML solution auto-injector injection, , Disp: , Rfl:   •  famotidine (PEPCID) 20 MG tablet, Take 20 mg by mouth Daily., Disp: , Rfl:   •  fexofenadine (ALLEGRA) 180 MG tablet, Take 180 mg by mouth Daily., Disp: , Rfl:   •  melatonin 1 MG tablet, Take 1 mg by mouth At Night As Needed for Sleep., Disp: , Rfl:   •  NIFEdipine CC (ADALAT CC) 60 MG 24 hr tablet, Take 1 tablet by mouth Daily., Disp: 90 tablet, Rfl: 2  •  sildenafil (VIAGRA) 100 MG tablet, Take 100  "mg by mouth As Needed for erectile dysfunction., Disp: , Rfl:       Objective:     Vitals:    03/12/21 1406 03/12/21 1413   BP: 130/76 124/72   BP Location: Right arm Left arm   Patient Position: Sitting Sitting   Pulse: 90 82   Resp: 18    SpO2: 95%    Weight: 113 kg (250 lb)    Height: 177.8 cm (70\")      Body mass index is 35.87 kg/m².    PHYSICAL EXAM:    Neck:      Vascular: No JVD.   Pulmonary:      Effort: Pulmonary effort is normal.      Breath sounds: Normal breath sounds.   Cardiovascular:      Normal rate. Regular rhythm.      Murmurs: There is no murmur.      No gallop. No click. No rub.   Pulses:     Intact distal pulses.           ECG 12 Lead    Date/Time: 3/12/2021 2:20 PM  Performed by: Bertha Briseno APRN  Authorized by: Bertha Briseno APRN   Comparison: compared with previous ECG from 7/6/2020  Similar to previous ECG  Rhythm: sinus rhythm  Rate: normal  BPM: 82  T flattening: aVL    Clinical impression: normal ECG  Comments: Indication: Rapid heart rate              Assessment:       Diagnosis Plan   1. Essential hypertension     2. Mixed hyperlipidemia     3. Rapid heart rate  ECG 12 Lead     Orders Placed This Encounter   Procedures   • ECG 12 Lead     This order was created via procedure documentation          Plan:       1.  Hypertension.  His blood pressure looks great.  Continue carvedilol and nifedipine.      2.  Hyperlipidemia.  Lipid panel from November 2020 revealed an LDL of 48 and HDL 34.  Continue atorvastatin 20 mg daily.      3.  Rapid heart rate.  His last Holter in 2018 revealed only rare atrial ectopic beats.  I offered to place another Holter.  Ultimately, we decided he would call me if he had any recurrence at which time I would order a Holter monitor.  I also advised him to take an extra carvedilol if this were to recur.      Overall think he is stable.  He denies any symptoms of angina or heart failure.  He will follow-up with Dr. Zaidi in July.      As always, " it has been a pleasure to participate in your patient's care.      Sincerely,         GÉNESIS Najera

## 2021-04-20 ENCOUNTER — TELEPHONE (OUTPATIENT)
Dept: INTERNAL MEDICINE | Facility: CLINIC | Age: 71
End: 2021-04-20

## 2021-04-20 NOTE — TELEPHONE ENCOUNTER
Ok for HUB to read - Left voice mail for patient to call office and schedule an appointment with Dr. Chacko on 04/21/2021.

## 2021-04-20 NOTE — TELEPHONE ENCOUNTER
"----- Message from Sachin Almanzar sent at 4/20/2021  2:22 PM EDT -----  Regarding: Non-Urgent Medical Question  Contact: [phone]  Dear Dr. Chacko:     I am writing to request an appointment to complete a \"Medical Review Exam\" form for the [place] [employer]. I've received a \"Real ID\" and new drivers license, and in the application process responded to a question asking if I'd had a \"black-out\" in the last 90 days, answering \"No.\" To a 2nd question about the last 3 years, I said \"yes\" because in July '18, I fainted at home which led to a fall, clavicle break and hospital stay at [facility], [place], operated by [facility]. I've been under care w/ Dr. Zaidi for syncopy since then with no recurrence. The 4 page form is due May 13 and can be filled out by a Nurse Practitioner or PA, but must be signed by an MD.  Could your staff make an appointment for me. Thank you! TAYLER Almanzar"

## 2021-04-22 RX ORDER — ATORVASTATIN CALCIUM 20 MG/1
TABLET, FILM COATED ORAL
Qty: 90 TABLET | Refills: 3 | Status: SHIPPED | OUTPATIENT
Start: 2021-04-22 | End: 2022-04-18 | Stop reason: SDUPTHER

## 2021-05-04 ENCOUNTER — OFFICE VISIT (OUTPATIENT)
Dept: INTERNAL MEDICINE | Facility: CLINIC | Age: 71
End: 2021-05-04

## 2021-05-04 VITALS
BODY MASS INDEX: 36.08 KG/M2 | SYSTOLIC BLOOD PRESSURE: 112 MMHG | DIASTOLIC BLOOD PRESSURE: 76 MMHG | OXYGEN SATURATION: 100 % | WEIGHT: 252 LBS | HEART RATE: 80 BPM | HEIGHT: 70 IN

## 2021-05-04 DIAGNOSIS — I10 ESSENTIAL HYPERTENSION: ICD-10-CM

## 2021-05-04 DIAGNOSIS — Z87.898 HISTORY OF SYNCOPE: Primary | ICD-10-CM

## 2021-05-04 PROCEDURE — 99212 OFFICE O/P EST SF 10 MIN: CPT | Performed by: INTERNAL MEDICINE

## 2021-05-04 NOTE — PROGRESS NOTES
Subjective     Sachin Almanzar is a 70 y.o. male who presents with   Chief Complaint   Patient presents with   • Loss of Consciousness     7/2018   • Driving evaluation       History of Present Illness     Patient is here for driving paperwork because of a syncopal event he had in 7/2018.  He was hospitalized at the time.  He saw a cardiologist with adjustments of his meds and no further issues.  He feels well.  His BP is well controlled.      Review of Systems   Constitutional: Negative for fever.   Respiratory: Negative.    Cardiovascular: Negative.        The following portions of the patient's history were reviewed and updated as appropriate: allergies, current medications and problem list.    Patient Active Problem List    Diagnosis Date Noted   • Rapid heart rate 03/12/2021   • Stage 3 chronic kidney disease (CMS/HCC) 01/10/2019   • Multiple renal cysts 05/07/2018   • Allergy to shellfish 04/19/2017   • Venous insufficiency of both lower extremities 04/21/2016   • Erectile dysfunction 02/25/2013   • Gastroesophageal reflux disease 02/25/2013   • Allergic rhinitis 07/10/2012   • Hyperlipidemia 07/10/2012   • Hypertension 07/10/2012   • Nephrolithiasis 07/10/2012     Note Last Updated: 7/27/2018     Overview:   Recurrent, Fall of 2016 (Dr. Muniz)  Had shockwave lithotripsy    Overview:   Recurrent, Fall of 2016 (Dr. Muniz)  Had shockwave lithotripsy     • Obstructive sleep apnea on CPAP 07/10/2012   • History of bladder cancer 07/10/2012     Note Last Updated: 8/26/2019     Overview:   3/2011, Dr. Muniz, TURBT, BCG and infusion of chemo         Current Outpatient Medications on File Prior to Visit   Medication Sig Dispense Refill   • atorvastatin (LIPITOR) 20 MG tablet TAKE 1 TABLET EVERY NIGHT 90 tablet 3   • carvedilol (COREG) 6.25 MG tablet Take 1 tablet by mouth Every 12 (Twelve) Hours. 180 tablet 2   • EPINEPHrine (EPIPEN) 0.3 MG/0.3ML solution auto-injector injection      • famotidine (PEPCID) 20 MG  "tablet Take 20 mg by mouth Daily.     • fexofenadine (ALLEGRA) 180 MG tablet Take 180 mg by mouth Daily.     • melatonin 1 MG tablet Take 1 mg by mouth At Night As Needed for Sleep.     • NIFEdipine CC (ADALAT CC) 60 MG 24 hr tablet Take 1 tablet by mouth Daily. 90 tablet 2   • sildenafil (VIAGRA) 100 MG tablet Take 100 mg by mouth As Needed for erectile dysfunction.       No current facility-administered medications on file prior to visit.       Objective     /76   Pulse 80   Ht 177.8 cm (70\")   Wt 114 kg (252 lb)   SpO2 100%   BMI 36.16 kg/m²     Physical Exam  Constitutional:       Appearance: He is well-developed.   HENT:      Head: Atraumatic.   Pulmonary:      Effort: Pulmonary effort is normal.   Neurological:      Mental Status: He is alert and oriented to person, place, and time.         Assessment/Plan   Diagnoses and all orders for this visit:    1. History of syncope (Primary)    2. Essential hypertension        Discussion    I filled out his paperwork today.  I have no concerns with his ability to operate a motor vehicle.         Future Appointments   Date Time Provider Department Center   5/24/2021 10:00 AM [facility]   6/1/2021  9:30 AM Yvonne Chacko MD [facility]   7/12/2021 12:20 PM Shorty Zaidi MD [facility]         "

## 2021-05-21 ENCOUNTER — TELEPHONE (OUTPATIENT)
Dept: INTERNAL MEDICINE | Facility: CLINIC | Age: 71
End: 2021-05-21

## 2021-05-21 DIAGNOSIS — I10 ESSENTIAL HYPERTENSION: ICD-10-CM

## 2021-05-21 DIAGNOSIS — Z12.5 SCREENING PSA (PROSTATE SPECIFIC ANTIGEN): ICD-10-CM

## 2021-05-21 DIAGNOSIS — E78.2 MIXED HYPERLIPIDEMIA: Primary | ICD-10-CM

## 2021-05-21 NOTE — TELEPHONE ENCOUNTER
----- Message from Sachin Almanzar sent at 2021 10:02 AM EDT -----  Regarding: Non-Urgent Medical Question  Contact: [phone]  I have a question about my upcoming appointment for lab work on Monday, May 24. Does it include a test for PSA? My urologist Dr. René Muniz performed a cystoscopy this week and all was clear: ten years past bladder cancer in  with no recurrence!  However, I had expected to get PSA results but discovered that PSA had not been included with the lab order prior to the visit with Dr. Muniz. So now I'm wondering if a PSA test should be included in the lab work that Dr. Chacko has ordered for Monday? I am not experiencing any prostate-related issues but I haven't had a PSA in 2 plus years. My upcoming appointment with Dr. Chacko is . Charles Almanzar  1950

## 2021-05-24 LAB
ALBUMIN SERPL-MCNC: 4.3 G/DL (ref 3.5–5.2)
ALBUMIN/GLOB SERPL: 1.7 G/DL
ALP SERPL-CCNC: 72 U/L (ref 39–117)
ALT SERPL-CCNC: 31 U/L (ref 1–41)
APPEARANCE UR: CLEAR
AST SERPL-CCNC: 21 U/L (ref 1–40)
BACTERIA #/AREA URNS HPF: ABNORMAL /HPF
BASOPHILS # BLD AUTO: 0.05 10*3/MM3 (ref 0–0.2)
BASOPHILS NFR BLD AUTO: 0.8 % (ref 0–1.5)
BILIRUB SERPL-MCNC: 0.7 MG/DL (ref 0–1.2)
BILIRUB UR QL STRIP: NEGATIVE
BUN SERPL-MCNC: 20 MG/DL (ref 8–23)
BUN/CREAT SERPL: 15 (ref 7–25)
CALCIUM SERPL-MCNC: 9.7 MG/DL (ref 8.6–10.5)
CASTS URNS MICRO: ABNORMAL
CHLORIDE SERPL-SCNC: 103 MMOL/L (ref 98–107)
CHOLEST SERPL-MCNC: 110 MG/DL (ref 0–200)
CO2 SERPL-SCNC: 23.4 MMOL/L (ref 22–29)
COLOR UR: YELLOW
CREAT SERPL-MCNC: 1.33 MG/DL (ref 0.76–1.27)
EOSINOPHIL # BLD AUTO: 0.16 10*3/MM3 (ref 0–0.4)
EOSINOPHIL NFR BLD AUTO: 2.5 % (ref 0.3–6.2)
EPI CELLS #/AREA URNS HPF: ABNORMAL /HPF
ERYTHROCYTE [DISTWIDTH] IN BLOOD BY AUTOMATED COUNT: 13.1 % (ref 12.3–15.4)
GLOBULIN SER CALC-MCNC: 2.5 GM/DL
GLUCOSE SERPL-MCNC: 129 MG/DL (ref 65–99)
GLUCOSE UR QL: NEGATIVE
HCT VFR BLD AUTO: 46.1 % (ref 37.5–51)
HDLC SERPL-MCNC: 33 MG/DL (ref 40–60)
HGB BLD-MCNC: 16.2 G/DL (ref 13–17.7)
HGB UR QL STRIP: NEGATIVE
IMM GRANULOCYTES # BLD AUTO: 0.05 10*3/MM3 (ref 0–0.05)
IMM GRANULOCYTES NFR BLD AUTO: 0.8 % (ref 0–0.5)
KETONES UR QL STRIP: ABNORMAL
LDLC SERPL CALC-MCNC: 47 MG/DL (ref 0–100)
LEUKOCYTE ESTERASE UR QL STRIP: NEGATIVE
LYMPHOCYTES # BLD AUTO: 0.85 10*3/MM3 (ref 0.7–3.1)
LYMPHOCYTES NFR BLD AUTO: 13.5 % (ref 19.6–45.3)
MCH RBC QN AUTO: 33.3 PG (ref 26.6–33)
MCHC RBC AUTO-ENTMCNC: 35.1 G/DL (ref 31.5–35.7)
MCV RBC AUTO: 94.9 FL (ref 79–97)
MONOCYTES # BLD AUTO: 0.73 10*3/MM3 (ref 0.1–0.9)
MONOCYTES NFR BLD AUTO: 11.6 % (ref 5–12)
NEUTROPHILS # BLD AUTO: 4.45 10*3/MM3 (ref 1.7–7)
NEUTROPHILS NFR BLD AUTO: 70.8 % (ref 42.7–76)
NITRITE UR QL STRIP: NEGATIVE
NRBC BLD AUTO-RTO: 0 /100 WBC (ref 0–0.2)
PH UR STRIP: 6 [PH] (ref 5–8)
PLATELET # BLD AUTO: 244 10*3/MM3 (ref 140–450)
POTASSIUM SERPL-SCNC: 4.8 MMOL/L (ref 3.5–5.2)
PROT SERPL-MCNC: 6.8 G/DL (ref 6–8.5)
PROT UR QL STRIP: ABNORMAL
PSA SERPL-MCNC: 0.62 NG/ML (ref 0–4)
RBC # BLD AUTO: 4.86 10*6/MM3 (ref 4.14–5.8)
RBC #/AREA URNS HPF: ABNORMAL /HPF
SODIUM SERPL-SCNC: 138 MMOL/L (ref 136–145)
SP GR UR: 1.02 (ref 1–1.03)
TRIGL SERPL-MCNC: 178 MG/DL (ref 0–150)
TSH SERPL DL<=0.005 MIU/L-ACNC: 2 UIU/ML (ref 0.27–4.2)
UROBILINOGEN UR STRIP-MCNC: ABNORMAL MG/DL
VLDLC SERPL CALC-MCNC: 30 MG/DL (ref 5–40)
WBC # BLD AUTO: 6.29 10*3/MM3 (ref 3.4–10.8)
WBC #/AREA URNS HPF: ABNORMAL /HPF

## 2021-06-01 ENCOUNTER — OFFICE VISIT (OUTPATIENT)
Dept: INTERNAL MEDICINE | Facility: CLINIC | Age: 71
End: 2021-06-01

## 2021-06-01 VITALS
DIASTOLIC BLOOD PRESSURE: 62 MMHG | HEART RATE: 84 BPM | WEIGHT: 248 LBS | OXYGEN SATURATION: 98 % | BODY MASS INDEX: 35.5 KG/M2 | HEIGHT: 70 IN | SYSTOLIC BLOOD PRESSURE: 118 MMHG

## 2021-06-01 DIAGNOSIS — I10 ESSENTIAL HYPERTENSION: ICD-10-CM

## 2021-06-01 DIAGNOSIS — E78.2 MIXED HYPERLIPIDEMIA: ICD-10-CM

## 2021-06-01 DIAGNOSIS — N18.30 STAGE 3 CHRONIC KIDNEY DISEASE, UNSPECIFIED WHETHER STAGE 3A OR 3B CKD (HCC): ICD-10-CM

## 2021-06-01 DIAGNOSIS — Z00.00 MEDICARE ANNUAL WELLNESS VISIT, SUBSEQUENT: Primary | ICD-10-CM

## 2021-06-01 DIAGNOSIS — E11.9 CONTROLLED TYPE 2 DIABETES MELLITUS WITHOUT COMPLICATION, WITHOUT LONG-TERM CURRENT USE OF INSULIN (HCC): ICD-10-CM

## 2021-06-01 DIAGNOSIS — Z00.00 WELL ADULT EXAM: ICD-10-CM

## 2021-06-01 LAB — HBA1C MFR BLD: 5.7 % (ref 4.8–5.6)

## 2021-06-01 PROCEDURE — 1159F MED LIST DOCD IN RCRD: CPT | Performed by: INTERNAL MEDICINE

## 2021-06-01 PROCEDURE — 1170F FXNL STATUS ASSESSED: CPT | Performed by: INTERNAL MEDICINE

## 2021-06-01 PROCEDURE — G0439 PPPS, SUBSEQ VISIT: HCPCS | Performed by: INTERNAL MEDICINE

## 2021-06-01 PROCEDURE — 99397 PER PM REEVAL EST PAT 65+ YR: CPT | Performed by: INTERNAL MEDICINE

## 2021-06-01 NOTE — PATIENT INSTRUCTIONS
Medicare Wellness  Personal Prevention Plan of Service     Date of Office Visit:  2021  Encounter Provider:  Yvonne Chacko MD  Place of Service:  [facility]  Patient Name: Sachin Almanzar  :  1950    As part of the Medicare Wellness portion of your visit today, we are providing you with this personalized preventive plan of services (PPPS). This plan is based upon recommendations of the [place] Preventive Services Task Force (USPSTF) and the Advisory Committee on Immunization Practices (ACIP).    This lists the preventive care services that should be considered, and provides dates of when you are due. Items listed as completed are up-to-date and do not require any further intervention.    Health Maintenance   Topic Date Due   • ZOSTER VACCINE (2 of 3) 2010   • ANNUAL WELLNESS VISIT  2021   • INFLUENZA VACCINE  2021   • LIPID PANEL  2022   • COLORECTAL CANCER SCREENING  2022   • TDAP/TD VACCINES (2 - Td or Tdap) 2023   • HEPATITIS C SCREENING  Completed   • COVID-19 Vaccine  Completed   • Pneumococcal Vaccine 65+  Completed       No orders of the defined types were placed in this encounter.      No follow-ups on file.

## 2021-06-01 NOTE — PROGRESS NOTES
The ABCs of the Annual Wellness Visit  Subsequent Medicare Wellness Visit    Chief Complaint   Patient presents with   • Medicare Wellness-subsequent       Subjective   History of Present Illness:  Sachin Almanzar is a 70 y.o. male who presents for a Subsequent Medicare Wellness Visit.    The following data was reviewed by: Yvonne Chacko MD on 06/01/2021:  Common labs    Common Labsle 6/24/20 6/24/20 6/24/20 11/17/20 11/17/20 5/24/21 5/24/21 5/24/21 5/24/21    0838 0838 0838 1102 1102 1013 1013 1013 1013   Glucose  125 (A)          Glucose    115 (A)   129 (A)     BUN  19  19   20     Creatinine  1.41 (A)  1.33 (A)   1.33 (A)     eGFR Non African Am  50 (A)  53 (A)   53 (A)     eGFR  Am    64   64     Sodium  134 (A)  138   138     Potassium  4.4  4.4   4.8     Chloride  102  102   103     Calcium  9.2  9.1   9.7     Total Protein    6.5   6.8     Albumin    4.10   4.30     Total Bilirubin    0.7   0.7     Alkaline Phosphatase    69   72     AST (SGOT)    25   21     ALT (SGPT)    34   31     WBC      6.29      Hemoglobin      16.2      Hematocrit      46.1      Platelets      244      Total Cholesterol     116   110    Triglycerides     211 (A)   178 (A)    HDL Cholesterol     34 (A)   33 (A)    LDL Cholesterol      48   47    Hemoglobin A1C 5.60           PSA   0.634      0.622   (A) Abnormal value       Comments are available for some flowsheets but are not being displayed.           Dm-2.  New diagnosis with two BS greater than 126.    HTN.  Excellent control.   HLD.  Excellent control.   CKD-3.  Numbers are stable.    HEALTH RISK ASSESSMENT    Recent Hospitalizations:  No hospitalization(s) within the last year.    Current Medical Providers:  Patient Care Team:  Yvonne Chacko MD as PCP - General (Internal Medicine)  René Muniz MD as Consulting Physician (Urology)  Rocael Steen MD as Consulting Physician (Dermatology)  Baljeet Roach MD as Consulting Physician  (Allergy)  Sumanth Kamara, MARIO (Optometry)    Smoking Status:  Social History     Tobacco Use   Smoking Status Never Smoker   Smokeless Tobacco Never Used   Tobacco Comment    CAFFEINE USE: 1 CUP COFFEE DAILY       Alcohol Consumption:  Social History     Substance and Sexual Activity   Alcohol Use Yes   • Alcohol/week: 2.0 - 3.0 standard drinks   • Types: 6 Cans of beer per week    Comment: 6 pk a week       Depression Screen:   PHQ-2/PHQ-9 Depression Screening 6/1/2021   Little interest or pleasure in doing things 0   Feeling down, depressed, or hopeless 0   Trouble falling or staying asleep, or sleeping too much -   Feeling tired or having little energy -   Poor appetite or overeating -   Feeling bad about yourself - or that you are a failure or have let yourself or your family down -   Trouble concentrating on things, such as reading the newspaper or watching television -   Moving or speaking so slowly that other people could have noticed. Or the opposite - being so fidgety or restless that you have been moving around a lot more than usual -   Thoughts that you would be better off dead, or of hurting yourself in some way -   Total Score 0   If you checked off any problems, how difficult have these problems made it for you to do your work, take care of things at home, or get along with other people? -       Fall Risk Screen:  STEADI Fall Risk Assessment was completed, and patient is at LOW risk for falls.Assessment completed on:6/1/2021    Health Habits and Functional and Cognitive Screening:  Functional & Cognitive Status 6/1/2021   Do you have difficulty preparing food and eating? No   Do you have difficulty bathing yourself, getting dressed or grooming yourself? No   Do you have difficulty using the toilet? No   Do you have difficulty moving around from place to place? No   Do you have trouble with steps or getting out of a bed or a chair? No   Current Diet Well Balanced Diet   Dental Exam Up to date         Dental Exam Comment -   Eye Exam Up to date        Eye Exam Comment -   Exercise (times per week) 3 times per week   Current Exercise Activities Include Walking   Do you need help using the phone?  No   Are you deaf or do you have serious difficulty hearing?  No   Do you need help with transportation? No   Do you need help shopping? No   Do you need help preparing meals?  No   Do you need help with housework?  No   Do you need help with laundry? No   Do you need help taking your medications? No   Do you need help managing money? No   Do you ever drive or ride in a car without wearing a seat belt? No   Have you felt unusual stress, anger or loneliness in the last month? No   Who do you live with? Spouse   If you need help, do you have trouble finding someone available to you? No   Have you been bothered in the last four weeks by sexual problems? No   Do you have difficulty concentrating, remembering or making decisions? No         Does the patient have evidence of cognitive impairment? No    Asprin use counseling:Does not need ASA (and currently is not on it)    Age-appropriate Screening Schedule:  Refer to the list below for future screening recommendations based on patient's age, sex and/or medical conditions. Orders for these recommended tests are listed in the plan section. The patient has been provided with a written plan.    Health Maintenance   Topic Date Due   • ZOSTER VACCINE (2 of 3) 02/12/2010   • INFLUENZA VACCINE  08/01/2021   • LIPID PANEL  05/24/2022   • TDAP/TD VACCINES (2 - Td or Tdap) 02/25/2023          The following portions of the patient's history were reviewed and updated as appropriate: allergies, current medications, past family history, past medical history, past social history, past surgical history and problem list.    Outpatient Medications Prior to Visit   Medication Sig Dispense Refill   • atorvastatin (LIPITOR) 20 MG tablet TAKE 1 TABLET EVERY NIGHT 90 tablet 3   • carvedilol  "(COREG) 6.25 MG tablet Take 1 tablet by mouth Every 12 (Twelve) Hours. 180 tablet 2   • EPINEPHrine (EPIPEN) 0.3 MG/0.3ML solution auto-injector injection      • famotidine (PEPCID) 20 MG tablet Take 20 mg by mouth Daily.     • fexofenadine (ALLEGRA) 180 MG tablet Take 180 mg by mouth Daily.     • melatonin 1 MG tablet Take 1 mg by mouth At Night As Needed for Sleep.     • NIFEdipine CC (ADALAT CC) 60 MG 24 hr tablet Take 1 tablet by mouth Daily. 90 tablet 2   • sildenafil (VIAGRA) 100 MG tablet Take 100 mg by mouth As Needed for erectile dysfunction.       No facility-administered medications prior to visit.       Patient Active Problem List   Diagnosis   • Allergic rhinitis   • Erectile dysfunction   • Gastroesophageal reflux disease   • Hyperlipidemia   • Hypertension   • Nephrolithiasis   • Obstructive sleep apnea on CPAP   • Venous insufficiency of both lower extremities   • Allergy to shellfish   • Multiple renal cysts   • Stage 3 chronic kidney disease (CMS/HCC)   • History of bladder cancer   • Rapid heart rate       Advanced Care Planning:  ACP discussion was held with the patient during this visit. Patient has an advance directive (not in EMR), copy requested.    Review of Systems   Respiratory: Negative.    Cardiovascular: Negative.        Compared to one year ago, the patient feels his physical health is the same.  Compared to one year ago, the patient feels his mental health is the same.    Reviewed chart for potential of high risk medication in the elderly: yes  Reviewed chart for potential of harmful drug interactions in the elderly:yes    Objective         Vitals:    06/01/21 0936   BP: 118/62   Pulse: 84   SpO2: 98%   Weight: 112 kg (248 lb)   Height: 177.8 cm (70\")   PainSc: 0-No pain       Body mass index is 35.58 kg/m².  Discussed the patient's BMI with him. The BMI is above average; BMI management plan is completed.    Physical Exam  Constitutional:       Appearance: He is well-developed. "   HENT:      Head: Normocephalic and atraumatic.      Right Ear: Hearing and tympanic membrane normal.      Left Ear: Hearing and tympanic membrane normal.      Mouth/Throat:      Pharynx: No posterior oropharyngeal erythema.   Neck:      Thyroid: No thyromegaly.   Cardiovascular:      Rate and Rhythm: Normal rate and regular rhythm.      Heart sounds: Normal heart sounds. No murmur heard.     Pulmonary:      Effort: Pulmonary effort is normal.      Breath sounds: Normal breath sounds.   Abdominal:      General: There is no distension.      Palpations: Abdomen is soft.      Tenderness: There is no abdominal tenderness.   Musculoskeletal:      Cervical back: Neck supple.   Lymphadenopathy:      Cervical: No cervical adenopathy.   Skin:     General: Skin is warm and dry.   Neurological:      Mental Status: He is alert and oriented to person, place, and time.   Psychiatric:         Speech: Speech normal.         Behavior: Behavior normal.         Thought Content: Thought content normal.         Judgment: Judgment normal.         Lab Results   Component Value Date     (H) 05/24/2021    CHLPL 110 05/24/2021    TRIG 178 (H) 05/24/2021    HDL 33 (L) 05/24/2021    LDL 47 05/24/2021    VLDL 30 05/24/2021        Assessment/Plan   Medicare Risks and Personalized Health Plan  CMS Preventative Services Quick Reference  Diabetic Lab Screening     The above risks/problems have been discussed with the patient.  Pertinent information has been shared with the patient in the After Visit Summary.  Follow up plans and orders are seen below in the Assessment/Plan Section.    Diagnoses and all orders for this visit:    1. Medicare annual wellness visit, subsequent (Primary)    2. Well adult exam    3. Essential hypertension    4. Mixed hyperlipidemia    5. Stage 3 chronic kidney disease, unspecified whether stage 3a or 3b CKD (CMS/[facility])    DM-2.   Discussed diagnosis of DM-2 is two blood sugars greater than 126.  Discussed  recommendations for DM-2 including annual dilated eye exam.    I would like the patient to go to diabetes education.  I gave the patient a prescription to get diabetes supplies.    HTN.  Control is good.  The patient is advised to continue current dosage of coreg and nifedipine.    HLD.  Control is good.  The patient is advised to continue current dosage of atorvastatin.    CKD-3.   We discussed the importance of NSAID avoidance.  We discussed the importance of water consumption.        Follow Up:  Return in about 6 months (around 12/1/2021) for Recheck.     An After Visit Summary and PPPS were given to the patient.

## 2021-06-14 RX ORDER — NIFEDIPINE 60 MG/1
60 TABLET, FILM COATED, EXTENDED RELEASE ORAL
Qty: 90 TABLET | Refills: 2 | Status: SHIPPED | OUTPATIENT
Start: 2021-06-14 | End: 2021-08-02

## 2021-06-14 RX ORDER — CARVEDILOL 6.25 MG/1
TABLET ORAL
Qty: 180 TABLET | Refills: 2 | Status: SHIPPED | OUTPATIENT
Start: 2021-06-14 | End: 2021-12-13

## 2021-06-29 RX ORDER — DOXYCYCLINE 100 MG/1
100 CAPSULE ORAL 2 TIMES DAILY
Qty: 20 CAPSULE | Refills: 0
Start: 2021-06-29 | End: 2021-08-04

## 2021-07-06 ENCOUNTER — HOSPITAL ENCOUNTER (OUTPATIENT)
Dept: DIABETES SERVICES | Facility: HOSPITAL | Age: 71
Setting detail: RECURRING SERIES
Discharge: HOME | End: 2021-07-06

## 2021-07-06 PROCEDURE — G0109 DIAB MANAGE TRN IND/GROUP: HCPCS

## 2021-07-12 ENCOUNTER — OFFICE VISIT (OUTPATIENT)
Dept: CARDIOLOGY | Facility: CLINIC | Age: 71
End: 2021-07-12

## 2021-07-12 VITALS
BODY MASS INDEX: 35.5 KG/M2 | WEIGHT: 248 LBS | HEART RATE: 76 BPM | HEIGHT: 70 IN | DIASTOLIC BLOOD PRESSURE: 66 MMHG | SYSTOLIC BLOOD PRESSURE: 128 MMHG

## 2021-07-12 DIAGNOSIS — E78.2 MIXED HYPERLIPIDEMIA: ICD-10-CM

## 2021-07-12 DIAGNOSIS — R00.0 RAPID HEART RATE: ICD-10-CM

## 2021-07-12 DIAGNOSIS — I10 ESSENTIAL HYPERTENSION: Primary | ICD-10-CM

## 2021-07-12 PROCEDURE — 93000 ELECTROCARDIOGRAM COMPLETE: CPT | Performed by: INTERNAL MEDICINE

## 2021-07-12 PROCEDURE — 99214 OFFICE O/P EST MOD 30 MIN: CPT | Performed by: INTERNAL MEDICINE

## 2021-07-12 RX ORDER — NIFEDIPINE 30 MG/1
30 TABLET, EXTENDED RELEASE ORAL DAILY
Qty: 30 TABLET | Refills: 0 | Status: SHIPPED | OUTPATIENT
Start: 2021-07-12 | End: 2021-08-02 | Stop reason: SDUPTHER

## 2021-07-12 NOTE — PROGRESS NOTES
Physical Therapy Re-Assessment / Re-Certification        Patient: Sachin Almanzar   : 1950  Visit Diagnoses:     ICD-10-CM ICD-9-CM   1. Acute right-sided low back pain without sciatica M54.5 724.2   2. Acute bilateral low back pain without sciatica M54.5 724.2     338.19     Referring practitioner: Yvonne Chacko MD  Date of Initial Visit: Type: THERAPY  Noted: 2019  Today's Date: 10/7/2019  Patient seen for 7 sessions      Subjective:   Sachin Almanzar reports:   Subjective Questionnaire: Oswestry:   Clinical Progress: improved  Home Program Compliance: Yes  Treatment has included: therapeutic exercise, manual therapy, electrical stimulation and cryotherapy    Subjective   Patient reports to therapy today noting that there were several instances where his back started slightly bothering him over the weekend, but the pain never got more than 1/10 at worst.  States that he believes he has made great progress,    At best pain ratin  At worst pain ratin    Objective   .     Active Range of Motion      Lumbar   Flexion: 31 degrees   Extension: 20 degrees   Left lateral flexion: 19 degrees   Right lateral flexion: 21 degrees   Left rotation: 38 degrees   Right rotation: 34 degrees      Strength/Myotome Testing      Lumbar   Left   Normal strength     Right Hip   Planes of Motion   Flexion: 4     Tests      Lumbar   Negative repeated extension.      Left   Negative passive SLR.      Right   Negative passive SLR.      Right Pelvic Girdle/Sacrum   Negative: sacrum compression.     Decreased bilateral LE HS flexibility    Assessment/Plan  Patient responded positively to treatment interventions aimed at increasing LE muscle flexibility and promoting improved strength/stability of lumbar spine and hip.  Required mild verbal cueing for technique with therapeutic exercise.  Patient presented with normal alignment in SIJ and no palpable tenderness to any hip or low back structures.  Plan to  Stable on Symbicort, Spiriva inhaler and pro-air inhaler as needed .   Has some LEE.    continue with direction of care in order to restore normal ROM and muscle flexibility and promote core and hip stability.    Progress toward previous goals: Partially Met    Goal Review  Plan Goals: Short Term Goals (2-3 wks):  1.  Patient will have increased lumbar spine ROM to WNLs to allow for increased functional joint mobility.  2.  Patient will have decreased pain to 0/10 to allow for increased comfort with functional activities.  3.  Patient will have increased lumbar spine segmental mobility to WNLs.  4.  Patient will have increased LE muscle flexibility to WNLs.    Long Term Goals (4-6 wks):  1.  Patient will be independent in performance of HEP for carryover upon discharge from skilled PT services.  2.  Patient will have improved Oswestry score of 10/50 or better.  3.  Patient will report return to performance of ADLs/Work/Sport/Leisure activities with minimal to no symptom reproduction/pain limitations.      Recommendations: Continue as planned  Timeframe: 1 month  Prognosis to achieve goals: good    PT Signature: Arthur Arceo, PT Student    Based upon review of the patient's progress and continued therapy plan, it is my medical opinion that Sachin Almanzar should continue physical therapy treatment at [facility]  [address] [place] [id number] [phone].    Signature: __________________________________  Yvonne Chacko MD    Timed:  Manual Therapy:    8     mins  95505;  Therapeutic Exercise:   39      mins  10088;     Neuromuscular Randy:        mins  87331;    Therapeutic Activity:          mins  30108;     Gait Training:           mins  77762;     Ultrasound:          mins  11868;  Iontophoresis:          mins  82772;    Dry Needling:          mins ;    Untimed:  Electrical Stimulation:    15     mins  92103 ( );  Mechanical Traction:         mins  90152;     Timed Treatment:  47    mins   Total Treatment:     62    mins

## 2021-07-12 NOTE — PROGRESS NOTES
Date of Office Visit: 21  Encounter Provider: Shorty Zaidi MD  Place of Service: [facility] CARDIOLOGY  Patient Name: Sachin Almanzar  :1950    Chief Complaint   Patient presents with   • Follow-up     3 month   • Hypertension   • Hx SVT   :     HPI:   70 y.o. male who presents today for follow-up.  Old records have been obtained and reviewed by me.  He is a patient of mine with a past cardiac history significant for hypertension, hyperlipidemia, and a distant history of SVT.  In 2018 he experienced a syncopal episode which led to a pretty extensive cardiac work-up.  Echocardiogram revealed normal LV systolic function and no significant valvular abnormalities.  He had a treadmill stress test no evidence of ischemia.  He also wore a Holter monitor which was relatively benign revealing only rare atrial ectopic beats.    Since her last visit has been doing very well.  He denies any chest pain or dyspnea.  He has not had any recent issues with tachycardia.  His blood pressure has been running lower as of late he does have some worry about that.  He has not had issues with lightheadedness or passing out.    Past Medical History:   Diagnosis Date   • Anemia    • Basal cell carcinoma 7/10/2012    Overview:  NOSE  Overview:  NOSE   • Basal cell carcinoma of skin 7/10/2012    Overview:  NOSE   • Bladder cancer (CMS/[facility]) 7/10/2012    Overview:  3/2011, Dr. Muniz, TURBT, BCG and infusion of chemo  Overview:  3/2011, Dr. 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 Muniz, TURBT, BCG and infusion of chemo   • Melanoma in situ of face (CMS/HCC) 2016    Overview:  Removed per Dr. Steen (derm) on 2016   • Obesity    • Renal insufficiency    • Sleep apnea with use of continuous positive airway pressure (CPAP)    • SVT (supraventricular tachycardia) (CMS/HCC)        Past Surgical History:   Procedure Laterality Date   • ADENOIDECTOMY     • APPENDECTOMY     • BLADDER TUMOR EXCISION     • CLAVICLE OPEN REDUCTION INTERNAL FIXATION Right 2018    Procedure: CLAVICLE OPEN REDUCTION INTERNAL FIXATION;  Surgeon: Cuate Elias MD;  Location: [facility] MAIN OR;  Service: Orthopedics   • COLONOSCOPY N/A 2017    Procedure: COLONOSCOPY INTO CECUM WITH COLD BX POLYPECTOMY ;  Surgeon: Domingo Cantrell MD;  Location: [facility] ENDOSCOPY;  Service:    • COSMETIC SURGERY      Nose - melanoma removal   • MOHS SURGERY      2 bcc   • TONSILLECTOMY         Social History     Socioeconomic History   • Marital status:      Spouse name: Julita   • Number of children: 1   • Years of education: Not on file   • Highest education level: Not on file   Tobacco Use   • Smoking status: Never Smoker   • Smokeless tobacco: Never Used   • Tobacco comment: CAFFEINE USE: 1 CUP COFFEE DAILY   Vaping Use   • Vaping Use: Never used   Substance and Sexual Activity   • Alcohol use: Yes     Alcohol/week: 2.0 - 3.0 standard drinks     Types: 6 Cans of beer per week     Comment: 6 pk a week   • Drug use: No   • Sexual activity: Yes     Partners: Female     Birth control/protection: None       Family History   Problem Relation Age of Onset   • Heart attack Mother          age 55   • Heart disease Mother          at age of 54   • Early death Father         unknown cause   • Diabetes Maternal Grandmother    • Hypertension Paternal  Uncle        Review of Systems   Constitutional: Negative. Negative for fever and malaise/fatigue.   HENT: Negative for nosebleeds and sore throat.    Eyes: Negative for blurred vision and double vision.   Cardiovascular: Positive for palpitations. Negative for chest pain, claudication, dyspnea on exertion, leg swelling, orthopnea, paroxysmal nocturnal dyspnea and syncope.   Respiratory: Negative.  Negative for cough, shortness of breath and snoring.    Endocrine: Negative for cold intolerance, heat intolerance and polydipsia.   Hematologic/Lymphatic: Negative for bleeding problem.   Skin: Negative for itching, poor wound healing and rash.   Musculoskeletal: Negative for falls, joint pain, joint swelling, muscle weakness and myalgias.   Gastrointestinal: Negative for abdominal pain, melena, nausea and vomiting.   Neurological: Negative for dizziness, light-headedness, loss of balance, seizures, vertigo and weakness.   Psychiatric/Behavioral: Negative for altered mental status and depression.       Allergies   Allergen Reactions   • Ace Inhibitors    • Shellfish-Derived Products Hives     Throat swelling, itching         Current Outpatient Medications:   •  atorvastatin (LIPITOR) 20 MG tablet, TAKE 1 TABLET EVERY NIGHT, Disp: 90 tablet, Rfl: 3  •  carvedilol (COREG) 6.25 MG tablet, TAKE 1 TABLET BY MOUTH EVERY 12 HOURS, Disp: 180 tablet, Rfl: 2  •  doxycycline (MONODOX) 100 MG capsule, Take 1 capsule by mouth 2 (Two) Times a Day., Disp: 20 capsule, Rfl: 0  •  EPINEPHrine (EPIPEN) 0.3 MG/0.3ML solution auto-injector injection, , Disp: , Rfl:   •  famotidine (PEPCID) 20 MG tablet, Take 20 mg by mouth Daily., Disp: , Rfl:   •  fexofenadine (ALLEGRA) 180 MG tablet, Take 180 mg by mouth Daily., Disp: , Rfl:   •  melatonin 1 MG tablet, Take 1 mg by mouth At Night As Needed for Sleep., Disp: , Rfl:   •  NIFEdipine CC (ADALAT CC) 60 MG 24 hr tablet, TAKE 1 TABLET BY MOUTH DAILY, Disp: 90 tablet, Rfl: 2  •  sildenafil  "(VIAGRA) 100 MG tablet, Take 100 mg by mouth As Needed for erectile dysfunction., Disp: , Rfl:       Objective:     Vitals:    07/12/21 1225   BP: 128/66   Pulse: 76   Weight: 112 kg (248 lb)   Height: 177.8 cm (70\")     Body mass index is 35.58 kg/m².    PHYSICAL EXAM:    Constitutional:       Appearance: Well-developed.   Eyes:      General: No scleral icterus.     Conjunctiva/sclera: Conjunctivae normal.   HENT:      Head: Normocephalic and atraumatic.   Neck:      Thyroid: No thyromegaly.      Vascular: Normal carotid pulses. No carotid bruit, hepatojugular reflux or JVD.      Trachea: No tracheal deviation.   Pulmonary:      Effort: Pulmonary effort is normal. No respiratory distress.      Breath sounds: Normal breath sounds.   Chest:      Chest wall: Not tender to palpatation.   Cardiovascular:      Normal rate. Regular rhythm.      Murmurs: There is no murmur.      No gallop. No click. No rub.   Abdominal:      General: Bowel sounds are normal. There is no distension.      Palpations: Abdomen is soft.      Tenderness: There is no abdominal tenderness.   Musculoskeletal:         General: No deformity.      Cervical back: Normal range of motion and neck supple. Skin:     Findings: No erythema or rash.   Neurological:      Mental Status: Alert and oriented to person, place, and time.      Sensory: No sensory deficit.   Psychiatric:         Behavior: Behavior normal.           ECG 12 Lead    Date/Time: 7/12/2021 12:47 PM  Performed by: Shorty Zaidi MD  Authorized by: Shorty Zaidi MD   Comparison: compared with previous ECG from 7/12/2021  Similar to previous ECG  Rhythm: sinus rhythm  Rate: normal  QRS axis: normal    Clinical impression: normal ECG              Assessment:      No diagnosis found.  No orders of the defined types were placed in this encounter.         Plan:       1.  Hypertension.  Blood pressure is low normal.  He occasionally has episodes where he will drop into the 90 to " 100 mmHg systolic range.  -Recommend decreasing nifedipine to 30 mg p.o. daily.  He is aware.  Continue carvedilol at current dose.    2.  Hyperlipidemia.  Lipid panel from November 2020 revealed an LDL of 48 and HDL 34.  Continue atorvastatin 20 mg daily.      3.  SVT: Doing well on current regimen.  Continue carvedilol at 6.25 mg p.o. twice daily.  Tolerating this well

## 2021-07-13 ENCOUNTER — HOSPITAL ENCOUNTER (OUTPATIENT)
Dept: DIABETES SERVICES | Facility: HOSPITAL | Age: 71
Setting detail: RECURRING SERIES
Discharge: HOME | End: 2021-07-13

## 2021-07-13 PROCEDURE — G0109 DIAB MANAGE TRN IND/GROUP: HCPCS

## 2021-07-20 ENCOUNTER — HOSPITAL ENCOUNTER (OUTPATIENT)
Dept: DIABETES SERVICES | Facility: HOSPITAL | Age: 71
Setting detail: RECURRING SERIES
Discharge: HOME | End: 2021-07-20

## 2021-07-20 PROCEDURE — G0109 DIAB MANAGE TRN IND/GROUP: HCPCS

## 2021-08-02 RX ORDER — EPINEPHRINE 0.3 MG/.3ML
0.3 INJECTION SUBCUTANEOUS ONCE AS NEEDED
Qty: 1 EACH | Refills: 5 | Status: SHIPPED | OUTPATIENT
Start: 2021-08-02 | End: 2022-08-14 | Stop reason: SDUPTHER

## 2021-08-02 RX ORDER — NIFEDIPINE 30 MG/1
30 TABLET, EXTENDED RELEASE ORAL DAILY
Qty: 90 TABLET | Refills: 3 | Status: SHIPPED | OUTPATIENT
Start: 2021-08-02 | End: 2021-12-13 | Stop reason: SDUPTHER

## 2021-08-03 ENCOUNTER — TELEPHONE (OUTPATIENT)
Dept: INTERNAL MEDICINE | Facility: CLINIC | Age: 71
End: 2021-08-03

## 2021-08-03 NOTE — TELEPHONE ENCOUNTER
----- Message from Nikki Omalley sent at 8/3/2021  2:43 PM EDT -----  Regarding: FW: Visit Follow-Up Question  Contact: [phone]  Last O/V  June 2021  ----- Message -----  From: Sachin SAÚL Almanzar  Sent: 8/3/2021  12:33 PM EDT  [facility]  Subject: Visit Follow-Up Question                         Dear Dr Chacko,  I am experiencing the lower back issue again. This is the first since August 2019. I took a muscle relaxer at bedtime last night(prescribed 2019). I do not take a nsaids.   Two years ago I had a short course of prednisone which was super successful- got me moving again and into PT. Now comfortable standing or prone only.  Advise. I can do video appt or call. Thanks. ALISSON

## 2021-08-04 ENCOUNTER — OFFICE VISIT (OUTPATIENT)
Dept: INTERNAL MEDICINE | Facility: CLINIC | Age: 71
End: 2021-08-04

## 2021-08-04 VITALS
OXYGEN SATURATION: 98 % | DIASTOLIC BLOOD PRESSURE: 72 MMHG | HEART RATE: 72 BPM | SYSTOLIC BLOOD PRESSURE: 130 MMHG | BODY MASS INDEX: 35.07 KG/M2 | HEIGHT: 70 IN | WEIGHT: 245 LBS

## 2021-08-04 DIAGNOSIS — M54.50 ACUTE BILATERAL LOW BACK PAIN WITHOUT SCIATICA: Primary | ICD-10-CM

## 2021-08-04 PROCEDURE — 99213 OFFICE O/P EST LOW 20 MIN: CPT | Performed by: INTERNAL MEDICINE

## 2021-08-04 RX ORDER — METHYLPREDNISOLONE 4 MG/1
TABLET ORAL
Qty: 21 TABLET | Refills: 0 | Status: SHIPPED | OUTPATIENT
Start: 2021-08-04 | End: 2021-09-07

## 2021-08-04 RX ORDER — CYCLOBENZAPRINE HCL 10 MG
10 TABLET ORAL 3 TIMES DAILY PRN
Qty: 30 TABLET | Refills: 0 | Status: SHIPPED | OUTPATIENT
Start: 2021-08-04 | End: 2022-10-11 | Stop reason: SDUPTHER

## 2021-08-04 NOTE — PROGRESS NOTES
Subjective     Sachin Almanzar is a 71 y.o. male who presents with   Chief Complaint   Patient presents with   • Back Pain       History of Present Illness     C/O Back pain.  Started on Saturday with moderately heavy lifting.  Lower lumbar area is painful.  No radiation to legs.  Flexeril is helpful.  No weakness.    Review of Systems   Neurological: Negative for weakness.       The following portions of the patient's history were reviewed and updated as appropriate: allergies, current medications and problem list.    Patient Active Problem List    Diagnosis Date Noted   • Rapid heart rate 03/12/2021   • Stage 3 chronic kidney disease (CMS/HCC) 01/10/2019   • Multiple renal cysts 05/07/2018   • Allergy to shellfish 04/19/2017   • Venous insufficiency of both lower extremities 04/21/2016   • Erectile dysfunction 02/25/2013   • Gastroesophageal reflux disease 02/25/2013   • Allergic rhinitis 07/10/2012   • Hyperlipidemia 07/10/2012   • Hypertension 07/10/2012   • Nephrolithiasis 07/10/2012     Note Last Updated: 7/27/2018     Overview:   Recurrent, Fall of 2016 (Dr. Muniz)  Had shockwave lithotripsy    Overview:   Recurrent, Fall of 2016 (Dr. Muniz)  Had shockwave lithotripsy     • Obstructive sleep apnea on CPAP 07/10/2012   • History of bladder cancer 07/10/2012     Note Last Updated: 8/26/2019     Overview:   3/2011, Dr. Muniz, TURBT, BCG and infusion of chemo         Current Outpatient Medications on File Prior to Visit   Medication Sig Dispense Refill   • atorvastatin (LIPITOR) 20 MG tablet TAKE 1 TABLET EVERY NIGHT 90 tablet 3   • carvedilol (COREG) 6.25 MG tablet TAKE 1 TABLET BY MOUTH EVERY 12 HOURS 180 tablet 2   • EPINEPHrine (EPIPEN) 0.3 MG/0.3ML solution auto-injector injection Inject 0.3 mL into the appropriate muscle as directed by prescriber 1 (One) Time As Needed (anaphylasis) for up to 6 doses. 1 each 5   • famotidine (PEPCID) 20 MG tablet Take 20 mg by mouth Daily.     • fexofenadine (ALLEGRA)  "180 MG tablet Take 180 mg by mouth Daily.     • melatonin 1 MG tablet Take 1 mg by mouth At Night As Needed for Sleep.     • NIFEdipine XL (PROCARDIA XL) 30 MG 24 hr tablet Take 1 tablet by mouth Daily. 90 tablet 3   • sildenafil (VIAGRA) 100 MG tablet Take 100 mg by mouth As Needed for erectile dysfunction.     • [DISCONTINUED] doxycycline (MONODOX) 100 MG capsule Take 1 capsule by mouth 2 (Two) Times a Day. 20 capsule 0     No current facility-administered medications on file prior to visit.       Objective     /72   Pulse 72   Ht 177.8 cm (70\")   Wt 111 kg (245 lb)   SpO2 98%   BMI 35.15 kg/m²     Physical Exam  Constitutional:       Appearance: He is well-developed.   HENT:      Head: Atraumatic.   Pulmonary:      Effort: Pulmonary effort is normal.   Musculoskeletal:      Lumbar back: Normal.   Neurological:      Mental Status: He is alert and oriented to person, place, and time.      Sensory: Sensation is intact.      Motor: Motor function is intact.      Coordination: Coordination is intact.      Deep Tendon Reflexes:      Reflex Scores:       Patellar reflexes are 2+ on the right side and 2+ on the left side.       Achilles reflexes are 2+ on the right side and 2+ on the left side.        Assessment/Plan   Diagnoses and all orders for this visit:    1. Acute bilateral low back pain without sciatica (Primary)    Other orders  -     cyclobenzaprine (FLEXERIL) 10 MG tablet; Take 1 tablet by mouth 3 (Three) Times a Day As Needed for Muscle Spasms.  Dispense: 30 tablet; Refill: 0  -     methylPREDNISolone (MEDROL) 4 MG dose pack; Take as directed on package instructions.  Dispense: 21 tablet; Refill: 0        Discussion    Patient presents with acute musculoskeletal low back pain.  I discussed with the patient a trial of conservative management with:   rest, heat, muscle relaxer and medrol dose bebeto.  Let me know if not feeling better over the next week or if there is any change in symptoms.     "     Future Appointments   Date Time Provider Department Center   9/7/2021  9:15 AM Yvonne Chacko MD [facility]   7/25/2022 11:00 AM Shorty Zaidi MD [facility]

## 2021-08-05 DIAGNOSIS — M54.50 ACUTE MIDLINE LOW BACK PAIN, UNSPECIFIED WHETHER SCIATICA PRESENT: Primary | ICD-10-CM

## 2021-08-23 ENCOUNTER — TELEPHONE (OUTPATIENT)
Dept: INTERNAL MEDICINE | Facility: CLINIC | Age: 71
End: 2021-08-23

## 2021-08-23 DIAGNOSIS — E11.9 CONTROLLED TYPE 2 DIABETES MELLITUS WITHOUT COMPLICATION, WITHOUT LONG-TERM CURRENT USE OF INSULIN (HCC): Primary | ICD-10-CM

## 2021-08-23 NOTE — TELEPHONE ENCOUNTER
----- Message from Jojo Savage MA sent at 8/23/2021 10:15 AM EDT -----  Regarding: FW: Visit Follow-Up Question  Contact: [phone]    ----- Message -----  From: Sachin Almanzar  Sent: 8/23/2021  10:09 AM EDT  To: Katerina Fox [facility]  Subject: Visit Follow-Up Question                         I have an appointment with Dr. Chacko on September 6 at 9:15 a.m. This is to follow-up on issues reviewed in an appointment on May 24 when my A1C of 5.7 and glucose level of 129 were identified as possible signs of pre-diabetes. At Dr. Chacko’s advice, I have been monitoring daily glucose levels and I participated in [facility]’s diabetes education sessions in July.     Prior to the upcoming September 6th appointment, should I have lab work done? At present I don’t have a lab appointment scheduled but could do so anytime the week of August 30-September 3rd, and I’m also open this week on Friday, August 27th.      Sachin Almanzar  [phone]

## 2021-08-31 DIAGNOSIS — I10 ESSENTIAL HYPERTENSION: ICD-10-CM

## 2021-08-31 DIAGNOSIS — E78.2 MIXED HYPERLIPIDEMIA: Primary | ICD-10-CM

## 2021-08-31 DIAGNOSIS — E11.9 CONTROLLED TYPE 2 DIABETES MELLITUS WITHOUT COMPLICATION, UNSPECIFIED WHETHER LONG TERM INSULIN USE (HCC): ICD-10-CM

## 2021-08-31 LAB
ALBUMIN SERPL-MCNC: 4.4 G/DL (ref 3.5–5.2)
ALBUMIN/GLOB SERPL: 2 G/DL
ALP SERPL-CCNC: 65 U/L (ref 39–117)
ALT SERPL-CCNC: 32 U/L (ref 1–41)
AST SERPL-CCNC: 25 U/L (ref 1–40)
BASOPHILS # BLD AUTO: 0.05 10*3/MM3 (ref 0–0.2)
BASOPHILS NFR BLD AUTO: 1 % (ref 0–1.5)
BILIRUB SERPL-MCNC: 1 MG/DL (ref 0–1.2)
BUN SERPL-MCNC: 20 MG/DL (ref 8–23)
BUN/CREAT SERPL: 14.9 (ref 7–25)
CALCIUM SERPL-MCNC: 9.5 MG/DL (ref 8.6–10.5)
CHLORIDE SERPL-SCNC: 105 MMOL/L (ref 98–107)
CHOLEST SERPL-MCNC: 95 MG/DL (ref 0–200)
CO2 SERPL-SCNC: 24.9 MMOL/L (ref 22–29)
CREAT SERPL-MCNC: 1.34 MG/DL (ref 0.76–1.27)
EOSINOPHIL # BLD AUTO: 0.19 10*3/MM3 (ref 0–0.4)
EOSINOPHIL NFR BLD AUTO: 3.6 % (ref 0.3–6.2)
ERYTHROCYTE [DISTWIDTH] IN BLOOD BY AUTOMATED COUNT: 13.2 % (ref 12.3–15.4)
GLOBULIN SER CALC-MCNC: 2.2 GM/DL
GLUCOSE SERPL-MCNC: 100 MG/DL (ref 65–99)
HBA1C MFR BLD: 5.6 % (ref 4.8–5.6)
HCT VFR BLD AUTO: 45 % (ref 37.5–51)
HDLC SERPL-MCNC: 28 MG/DL (ref 40–60)
HGB BLD-MCNC: 15.8 G/DL (ref 13–17.7)
IMM GRANULOCYTES # BLD AUTO: 0.03 10*3/MM3 (ref 0–0.05)
IMM GRANULOCYTES NFR BLD AUTO: 0.6 % (ref 0–0.5)
LDLC SERPL CALC-MCNC: 44 MG/DL (ref 0–100)
LYMPHOCYTES # BLD AUTO: 1.04 10*3/MM3 (ref 0.7–3.1)
LYMPHOCYTES NFR BLD AUTO: 20 % (ref 19.6–45.3)
MCH RBC QN AUTO: 32.8 PG (ref 26.6–33)
MCHC RBC AUTO-ENTMCNC: 35.1 G/DL (ref 31.5–35.7)
MCV RBC AUTO: 93.4 FL (ref 79–97)
MONOCYTES # BLD AUTO: 0.72 10*3/MM3 (ref 0.1–0.9)
MONOCYTES NFR BLD AUTO: 13.8 % (ref 5–12)
NEUTROPHILS # BLD AUTO: 3.18 10*3/MM3 (ref 1.7–7)
NEUTROPHILS NFR BLD AUTO: 61 % (ref 42.7–76)
NRBC BLD AUTO-RTO: 0 /100 WBC (ref 0–0.2)
PLATELET # BLD AUTO: 233 10*3/MM3 (ref 140–450)
POTASSIUM SERPL-SCNC: 4.8 MMOL/L (ref 3.5–5.2)
PROT SERPL-MCNC: 6.6 G/DL (ref 6–8.5)
RBC # BLD AUTO: 4.82 10*6/MM3 (ref 4.14–5.8)
SODIUM SERPL-SCNC: 142 MMOL/L (ref 136–145)
TRIGL SERPL-MCNC: 130 MG/DL (ref 0–150)
VLDLC SERPL CALC-MCNC: 23 MG/DL (ref 5–40)
WBC # BLD AUTO: 5.21 10*3/MM3 (ref 3.4–10.8)

## 2021-09-07 ENCOUNTER — OFFICE VISIT (OUTPATIENT)
Dept: INTERNAL MEDICINE | Facility: CLINIC | Age: 71
End: 2021-09-07

## 2021-09-07 VITALS
HEIGHT: 70 IN | WEIGHT: 237 LBS | DIASTOLIC BLOOD PRESSURE: 72 MMHG | BODY MASS INDEX: 33.93 KG/M2 | OXYGEN SATURATION: 98 % | HEART RATE: 88 BPM | SYSTOLIC BLOOD PRESSURE: 122 MMHG

## 2021-09-07 DIAGNOSIS — E11.9 CONTROLLED TYPE 2 DIABETES MELLITUS WITHOUT COMPLICATION, UNSPECIFIED WHETHER LONG TERM INSULIN USE (HCC): Primary | ICD-10-CM

## 2021-09-07 DIAGNOSIS — E78.2 MIXED HYPERLIPIDEMIA: ICD-10-CM

## 2021-09-07 DIAGNOSIS — I10 ESSENTIAL HYPERTENSION: ICD-10-CM

## 2021-09-07 PROBLEM — R00.0 RAPID HEART RATE: Status: RESOLVED | Noted: 2021-03-12 | Resolved: 2021-09-07

## 2021-09-07 PROCEDURE — 99214 OFFICE O/P EST MOD 30 MIN: CPT | Performed by: INTERNAL MEDICINE

## 2021-09-07 RX ORDER — BLOOD SUGAR DIAGNOSTIC
STRIP MISCELLANEOUS DAILY
COMMUNITY
Start: 2021-08-29 | End: 2022-06-02 | Stop reason: SDUPTHER

## 2021-09-07 RX ORDER — LANCETS 33 GAUGE
EACH MISCELLANEOUS DAILY
COMMUNITY
Start: 2021-08-27

## 2021-09-07 NOTE — PROGRESS NOTES
Subjective     Sachin Almanzar is a 71 y.o. male who presents with   Chief Complaint   Patient presents with   • Diabetes   • Hypertension   • Hyperlipidemia       History of Present Illness     The following data was reviewed by: Yvonne Chacko MD on 09/07/2021:  Common labs    Common Labsle 5/24/21 5/24/21 5/24/21 5/24/21 6/1/21 8/31/21 8/31/21 8/31/21 8/31/21    1013 1013 1013 1013  1102 1102 1102 1102   Glucose  129 (A)     100 (A)     BUN  20     20     Creatinine  1.33 (A)     1.34 (A)     eGFR Non  Am  53 (A)     52 (A)     eGFR  Am  64     63     Sodium  138     142     Potassium  4.8     4.8     Chloride  103     105     Calcium  9.7     9.5     Total Protein  6.8     6.6     Albumin  4.30     4.40     Total Bilirubin  0.7     1.0     Alkaline Phosphatase  72     65     AST (SGOT)  21     25     ALT (SGPT)  31     32     WBC 6.29     5.21      Hemoglobin 16.2     15.8      Hematocrit 46.1     45.0      Platelets 244     233      Total Cholesterol   110     95    Triglycerides   178 (A)     130    HDL Cholesterol   33 (A)     28 (A)    LDL Cholesterol    47     44    Hemoglobin A1C     5.70 (A)    5.60   PSA    0.622        (A) Abnormal value       Comments are available for some flowsheets but are not being displayed.           DM-2.  Control is good.  HTN.   Excellent BP control.   HLD.  LDL is excellent.      Review of Systems   Constitutional: Negative for fatigue.   Cardiovascular: Negative for chest pain.   Endocrine: Negative for polydipsia, polyphagia and polyuria.   Skin: Negative for pallor.   Neurological: Negative for dizziness, tremors, seizures, speech difficulty, weakness and headaches.   Psychiatric/Behavioral: Negative for confusion. The patient is not nervous/anxious.        The following portions of the patient's history were reviewed and updated as appropriate: allergies, current medications and problem list.    Patient Active Problem List    Diagnosis Date Noted   •  Diabetes mellitus type II, controlled, with no complications (CMS/[facility]) 08/23/2021   • Stage 3 chronic kidney disease (CMS/[facility]) 01/10/2019   • Multiple renal cysts 05/07/2018   • Allergy to shellfish 04/19/2017   • Venous insufficiency of both lower extremities 04/21/2016   • Erectile dysfunction 02/25/2013   • Gastroesophageal reflux disease 02/25/2013   • Allergic rhinitis 07/10/2012   • Hyperlipidemia 07/10/2012   • Hypertension 07/10/2012   • Nephrolithiasis 07/10/2012     Note Last Updated: 7/27/2018     Overview:   Recurrent, Fall of 2016 (Dr. Muniz)  Had shockwave lithotripsy    Overview:   Recurrent, Fall of 2016 (Dr. Muniz)  Had shockwave lithotripsy     • Obstructive sleep apnea on CPAP 07/10/2012   • History of bladder cancer 07/10/2012     Note Last Updated: 8/26/2019     Overview:   3/2011, Dr. Muniz, TURBT, BCG and infusion of chemo         Current Outpatient Medications on File Prior to Visit   Medication Sig Dispense Refill   • atorvastatin (LIPITOR) 20 MG tablet TAKE 1 TABLET EVERY NIGHT 90 tablet 3   • carvedilol (COREG) 6.25 MG tablet TAKE 1 TABLET BY MOUTH EVERY 12 HOURS 180 tablet 2   • cyclobenzaprine (FLEXERIL) 10 MG tablet Take 1 tablet by mouth 3 (Three) Times a Day As Needed for Muscle Spasms. 30 tablet 0   • EPINEPHrine (EPIPEN) 0.3 MG/0.3ML solution auto-injector injection Inject 0.3 mL into the appropriate muscle as directed by prescriber 1 (One) Time As Needed (anaphylasis) for up to 6 doses. 1 each 5   • famotidine (PEPCID) 20 MG tablet Take 20 mg by mouth Daily.     • fexofenadine (ALLEGRA) 180 MG tablet Take 180 mg by mouth Daily.     • Lancets (OneTouch Delica Plus Nutdol69R) misc Daily. for testing     • melatonin 1 MG tablet Take 1 mg by mouth At Night As Needed for Sleep.     • NIFEdipine XL (PROCARDIA XL) 30 MG 24 hr tablet Take 1 tablet by mouth Daily. 90 tablet 3   • OneTouch Verio test strip Daily. for testing     • sildenafil (VIAGRA) 100 MG tablet Take 100 mg by mouth As  "Needed for erectile dysfunction.     • [DISCONTINUED] methylPREDNISolone (MEDROL) 4 MG dose pack Take as directed on package instructions. 21 tablet 0     No current facility-administered medications on file prior to visit.       Objective     /72   Pulse 88   Ht 177.8 cm (70\")   Wt 108 kg (237 lb)   SpO2 98%   BMI 34.01 kg/m²     Physical Exam  Constitutional:       Appearance: He is well-developed.   HENT:      Head: Atraumatic.   Pulmonary:      Effort: Pulmonary effort is normal.   Neurological:      Mental Status: He is alert and oriented to person, place, and time.         Assessment/Plan   Diagnoses and all orders for this visit:    1. Controlled type 2 diabetes mellitus without complication, unspecified whether long term insulin use (CMS/[facility]) (Primary)    2. Essential hypertension    3. Mixed hyperlipidemia        Discussion    Dm-2.  Continue healthy diet.  HTN.  Control is good.  The patient is advised to continue current dosage of carvediol and cardizem.   HLD.  Control is good.  The patient is advised to continue current dosage of atorvastatin.             Future Appointments   Date Time Provider Department Center   9/8/2021  1:15 PM Ольга Preciado PT [facility]   7/25/2022 11:00 AM Shorty Zaidi MD [facility]         "

## 2021-09-08 ENCOUNTER — TREATMENT (OUTPATIENT)
Dept: PHYSICAL THERAPY | Facility: CLINIC | Age: 71
End: 2021-09-08

## 2021-09-08 DIAGNOSIS — M54.50 ACUTE MIDLINE LOW BACK PAIN, UNSPECIFIED WHETHER SCIATICA PRESENT: Primary | ICD-10-CM

## 2021-09-08 PROCEDURE — 97161 PT EVAL LOW COMPLEX 20 MIN: CPT | Performed by: PHYSICAL THERAPIST

## 2021-09-08 PROCEDURE — 97110 THERAPEUTIC EXERCISES: CPT | Performed by: PHYSICAL THERAPIST

## 2021-09-08 PROCEDURE — 97530 THERAPEUTIC ACTIVITIES: CPT | Performed by: PHYSICAL THERAPIST

## 2021-09-08 NOTE — PROGRESS NOTES
"  Physical Therapy Initial Evaluation and Plan of Care        Patient: Sachin Almanzar   : 1950  Visit Diagnoses:     ICD-10-CM ICD-9-CM   1. Acute midline low back pain, unspecified whether sciatica present  M54.5 724.2     Referring practitioner: Pamela Hammer MD  Date of Initial Visit: 2021  Today's Date: 2021  Patient seen for 1 sessions           Subjective Questionnaire: Oswestry: 17      Subjective Evaluation    History of Present Illness  Date of onset: 2021  Mechanism of injury: Patient reports exacerbation of low back pain after lifting boxes off the floor.  States the intensity of the pain eased after about 8 days.  States the medication really helped.    States he has been walking about 2 miles per day and when he first starts out walking he gets a sharp pain in left hip and thigh but it eases as he continues to walk.     Reports N/T in bottom of foot from \"ball\" of foot to toes.    PMH: Pre-diabetes, Hypertension; SVT (supraventricular tachycardia) (CMS/HCC); Sleep apnea with use of continuous positive airway pressure (CPAP); Gastric ulcer; GERD (gastroesophageal reflux disease); Hyperlipidemia; Smithland's disease, bladder cancer.    Subjective comment: Patient reports low back pain.  Patient Occupation: Retired Pain  Current pain ratin  At best pain ratin  At worst pain ratin  Location: Bilateral lumbar  Quality: sharp and dull ache  Aggravating factors: movement, standing and ambulation (getting out of bed, getting up from a chair, )    Social Support  Lives in: one-story house  Lives with: spouse    Diagnostic Tests  No diagnostic tests performed    Treatments  Previous treatment: physical therapy  Patient Goals  Patient goals for therapy: decreased pain and increased strength  Patient goal: Get back on an exercise program.           Objective          Palpation   Left   Hypertonic in the lumbar paraspinals.     Right   Hypertonic in the lumbar paraspinals. "     Tenderness     Right Hip   Tenderness in the PSIS.     Additional Tenderness Details  Sacral base    Active Range of Motion     Lumbar   Flexion: 8 degrees   Extension: 12 degrees   Left lateral flexion: 8 degrees   Right lateral flexion: 10 degrees   Left rotation: 50 degrees   Right rotation: 40 degrees     Strength/Myotome Testing     Left Hip   Planes of Motion   Flexion: 3    Right Hip   Planes of Motion   Flexion: 4    Left Knee   Flexion: 4+  Extension: 4+    Right Knee   Flexion: 4+  Extension: 4+    Left Ankle/Foot   Dorsiflexion: 4+  Plantar flexion: 3  Eversion: 4+  Great toe extension: 3    Right Ankle/Foot   Dorsiflexion: 4+  Plantar flexion: 3  Eversion: 4  Great toe extension: 4    Muscle Activation     Additional Muscle Activation Details  Fair core muscle activation.    Tests     Lumbar     Left   Negative passive SLR and quadrant.     Right   Negative passive SLR and quadrant.     Left Hip   Negative piriformis.   Modified Dillan: Negative.     Right Hip   Positive piriformis.   Modified Dillan: Negative.     Additional Tests Details  SLR: L 50 degrees, R 55 degrees (discomfort in hamstrings bilaterally, no reproduction of LBP)    Sacral torsion  Decreased quadriceps muscle flexibility.          Assessment & Plan     Assessment  Impairments: abnormal or restricted ROM, activity intolerance, impaired physical strength and pain with function  Assessment details: Sachin Almanzar is a pleasant 71 y.o. male that presents with decreased lumbar spine ROM, sacral mal-alignment, decreased muscle flexibility, decreased LE strength and pain limited functional activity tolerance. Pt will benefit from skilled PT services in order to address listed impairments, decrease pain, and restore function.  Prognosis: good  Prognosis details: Patient demonstrates good rehab potential as evidenced by high motivation to participate with PT POC and to return to [facility].  Functional Limitations: walking, uncomfortable  because of pain and standing  Goals  Plan Goals: Short Term Goals (3-4 visits):  1.  Patient will have increased lumbar spine ROM to WFLs to allow for increased functional joint mobility.  2.  Patient will have increased core muscle activity to WFLs to allow for increased spinal stabilization and support.  3.  Patient will have symmetrical sacral alignment.  4.  Patient will have increased SLR to 70 degrees or better.    Long Term Goals (6 visits):  1.  Patient will be independent in performance of HEP for carryover upon discharge from skilled PT services.  2.  Patient will have improved Oswestry score of 10/50 or better.  3.  Patient will have LE strength of 4+/5 or better.  4.  Patient will demonstrate functional transitions i.e. sit to stand/supine to sit with proper posture/technique.    Plan  Therapy options: will be seen for skilled physical therapy services  Planned modality interventions: TENS, traction and cryotherapy  Planned therapy interventions: manual therapy, postural training, soft tissue mobilization, spinal/joint mobilization, strengthening, stretching, flexibility, functional ROM exercises, home exercise program, neuromuscular re-education, therapeutic activities and abdominal trunk stabilization  Frequency: 1x week  Duration in visits: 6  Treatment plan discussed with: patient  Plan details: Pt was educated on the importance of their HEP and their current need for continued skilled physical therapy. Patients goals and potential limitations were discussed and pt is in agreement with current plan of care and treatment emphasis.            Timed:  Manual Therapy:         mins  91656;  Therapeutic Exercise:    15     mins  80373;     Neuromuscular Randy:        mins  18459;    Therapeutic Activity:     10     mins  40081;     Gait Training:           mins  25613;     Ultrasound:          mins  29837;  Iontophoresis:          mins  44202;    Dry Needling:          mins;    Untimed:  Electrical  Stimulation:         mins  43447 ( );  Mechanical Traction:         mins  59912;   Canalith Repositioning:        mins  50547;    Timed Treatment:   25   mins   Total Treatment:     60   mins    PT SIGNATURE: Ольга Preciado, TONY   DATE TREATMENT INITIATED: 9/8/2021    Initial Certification  Certification Period: 12/7/2021  I certify that the therapy services are furnished while this patient is under my care.  The services outlined above are required by this patient, and will be reviewed every 90 days.     PHYSICIAN: Pamela Hammer MD      DATE:     Please sign and return via fax to [phone]. Thank you, [facility].

## 2021-09-21 ENCOUNTER — TREATMENT (OUTPATIENT)
Dept: PHYSICAL THERAPY | Facility: CLINIC | Age: 71
End: 2021-09-21

## 2021-09-21 DIAGNOSIS — M54.50 ACUTE MIDLINE LOW BACK PAIN, UNSPECIFIED WHETHER SCIATICA PRESENT: Primary | ICD-10-CM

## 2021-09-21 PROCEDURE — 97530 THERAPEUTIC ACTIVITIES: CPT | Performed by: PHYSICAL THERAPIST

## 2021-09-21 PROCEDURE — 97140 MANUAL THERAPY 1/> REGIONS: CPT | Performed by: PHYSICAL THERAPIST

## 2021-09-21 PROCEDURE — 97110 THERAPEUTIC EXERCISES: CPT | Performed by: PHYSICAL THERAPIST

## 2021-09-21 NOTE — PROGRESS NOTES
Physical Therapy Daily Progress Note      Patient: Sachin Almanzar   : 1950  Treatment Diagnosis:     ICD-10-CM ICD-9-CM   1. Acute midline low back pain, unspecified whether sciatica present  M54.5 724.2     Referring practitioner: Pamela Hammer MD  Date of Initial Visit: Type: THERAPY  Noted: 2021  Today's Date: 2021  Patient seen for 2 sessions         Sachin Almanzar reports:     Subjective   Patient reports he went out of town last weekend and had a 6 hour drive.  States he was stiff but did have have increased pain level.  States with walking he does not get increased pain but he gets numbness in the right leg.    Objective   See Exercise, Manual, and Modality Logs for complete treatment.       Assessment/Plan  Patient presents with right LE neural tension.  He responded positively to trial of manual lumbar traction.  Progressed program/HEP with core stabilization and nerve glide exercises.  Will consider mechanical lumbar traction next session based on his response to today's session.  Progress per Plan of Care           Timed:  Manual Therapy:    13     mins  53154;  Therapeutic Exercise:    15     mins  40813;     Neuromuscular Randy:        mins  28617;    Therapeutic Activity:     10     mins  78676;     Gait Training:           mins  53327;     Ultrasound:          mins  92092;    Iontophoresis:          mins  51502;  Dry Needling:          mins ;    Untimed:  Electrical Stimulation:         mins  21859 ( );  Mechanical Traction:         mins  53987;   Canalith Repositioning:        mins  83673;    Timed Treatment:   38   mins   Total Treatment:     43   mins    Ольга Preciado PT  Physical Therapist

## 2021-09-28 ENCOUNTER — TREATMENT (OUTPATIENT)
Dept: PHYSICAL THERAPY | Facility: CLINIC | Age: 71
End: 2021-09-28

## 2021-09-28 DIAGNOSIS — M54.50 ACUTE MIDLINE LOW BACK PAIN, UNSPECIFIED WHETHER SCIATICA PRESENT: Primary | ICD-10-CM

## 2021-09-28 PROCEDURE — 97110 THERAPEUTIC EXERCISES: CPT | Performed by: PHYSICAL THERAPIST

## 2021-09-28 NOTE — PROGRESS NOTES
"   Physical Therapy Daily Progress Note      Patient: Sachin Almanzar   : 1950  Treatment Diagnosis:     ICD-10-CM ICD-9-CM   1. Acute midline low back pain, unspecified whether sciatica present  M54.5 724.2     Referring practitioner: Pamela Hammer MD  Date of Initial Visit: Type: THERAPY  Noted: 2021  Today's Date: 2021  Patient seen for 3 sessions         Sachin Almanzar reports:       Subjective   Patient reports he has not been having back pain.  States he had one day he had a \"quirky feeling\" in his right foot.  States he has discomfort in his back when he tries getting out of bed without doing the log roll.  States he has been doing well with home exercises but not really progressing with the hamstring flexibility.    Objective   See Exercise, Manual, and Modality Logs for complete treatment.       Assessment/Plan  Patient continues to have significant hamstring muscle tightness bilaterally, R > L as well as mild neural tension in right LE.  He was able to perform new stretching exercises to target flexibility in the hip/lumbopelvic region without adverse symptoms.  Progressed HEP and issued written instructions for home use.   Progress per Plan of Care           Timed:  Manual Therapy:         mins  38396;  Therapeutic Exercise:   40      mins  49874;     Neuromuscular Randy:        mins  32377;    Therapeutic Activity:          mins  12018;     Gait Training:           mins  43706;     Ultrasound:          mins  88913;    Iontophoresis:          mins  15851;  Dry Needling:          mins  18060;  Dry Needling:          mins  50199;    Untimed:  Electrical Stimulation:         mins  04659 (MC );  Mechanical Traction:         mins  85120;   Canalith Repositioning:      mins  98416;    Timed Treatment:   40   mins   Total Treatment:     40   mins    Ольга Preciado PT  Physical Therapist                    "

## 2021-10-05 ENCOUNTER — TREATMENT (OUTPATIENT)
Dept: PHYSICAL THERAPY | Facility: CLINIC | Age: 71
End: 2021-10-05

## 2021-10-05 DIAGNOSIS — M54.50 ACUTE MIDLINE LOW BACK PAIN, UNSPECIFIED WHETHER SCIATICA PRESENT: Primary | ICD-10-CM

## 2021-10-05 PROCEDURE — 97530 THERAPEUTIC ACTIVITIES: CPT | Performed by: PHYSICAL THERAPIST

## 2021-10-05 PROCEDURE — 97012 MECHANICAL TRACTION THERAPY: CPT | Performed by: PHYSICAL THERAPIST

## 2021-10-05 NOTE — PROGRESS NOTES
Physical Therapy Re-Assessment / Re-Certification        Patient: Sachin Almanzar   : 1950  Visit Diagnoses:     ICD-10-CM ICD-9-CM   1. Acute midline low back pain, unspecified whether sciatica present  M54.50 724.2     Referring practitioner: Pamela Hammer MD  Date of Initial Visit: Type: THERAPY  Noted: 2021  Today's Date: 10/5/2021  Patient seen for 4 sessions      Subjective:   Sachin Almanzar reports:   Subjective Questionnaire: Oswestry:   Clinical Progress: improved  Home Program Compliance: Yes  Treatment has included: therapeutic exercise, manual therapy and therapeutic activity    Subjective   Patient reports his back has been feeling better, gets an occasional tweak of pain but able to work through the episodes.  States his hamstring flexibility does not feels like it is progressing.     Objective     Palpation   Left   Hypertonic in the lumbar paraspinals.      Right   Hypertonic in the lumbar paraspinals.      Tenderness      Right Hip   Tenderness in the PSIS.     Additional Tenderness Details  Sacral base     Active Range of Motion      Lumbar   Flexion: 25 degrees   Extension: 15 degrees   Left lateral flexion: 15 degrees   Right lateral flexion: 15 degrees   Left rotation: 45 degrees   Right rotation: 45 degrees      Strength/Myotome Testing      Left Hip   Planes of Motion   Flexion: 4+     Right Hip   Planes of Motion   Flexion: 4     Left Knee   Flexion: 5  Extension: 5     Right Knee   Flexion: 5  Extension: 5     Left Ankle/Foot   Dorsiflexion: 5  Plantar flexion: 4  Eversion: 5  Great toe extension: 4+     Right Ankle/Foot   Dorsiflexion: 5  Plantar flexion: 4  Eversion: 5  Great toe extension: 4+     Muscle Activation     Additional Muscle Activation Details  Fair+ core muscle activation. Better static than dynamically.     Tests      Lumbar      Left   Negative passive SLR and quadrant.      Right   Negative passive SLR and quadrant.      Left Hip   Negative  piriformis.   Modified Dillan: Negative.      Right Hip   Positive piriformis.   Modified Dillan: Negative.     Additional Tests Details  SLR: L 60 degrees, R 55 degrees (discomfort in hamstrings bilaterally, no reproduction of LBP)    Symmetrical Sacral alignment    Decreased quadriceps muscle flexibility.          Assessment/Plan  Patient demonstrates improved lumbar ROM and LE strength. Limitations are still present.  He continues to have restricted HS flexibility with positive neural tension on the right LE.  Oswestry score improved from 17/50 to 11/50.  Initiated trial of mechanical lumbar traction with positive response.  Progress toward previous goals: Partially Met    Goal Review  Short Term Goals (3-4 visits):  1.  Patient will have increased lumbar spine ROM to WFLs to allow for increased functional joint mobility.-progressing  2.  Patient will have increased core muscle activity to WFLs to allow for increased spinal stabilization and support.-progressing  3.  Patient will have symmetrical sacral alignment.-met  4.  Patient will have increased SLR to 70 degrees or better.-progressing    Long Term Goals (6 visits):  1.  Patient will be independent in performance of HEP for carryover upon discharge from skilled PT services.-progressing  2.  Patient will have improved Oswestry score of 10/50 or better.-progressing  3.  Patient will have LE strength of 4+/5 or better.-partially met  4.  Patient will demonstrate functional transitions i.e. sit to stand/supine to sit with proper posture/technique.-progressing      Recommendations: Continue as planned  Timeframe: 1 month  Prognosis to achieve goals: good    PT Signature: Ольга Preciado, PT      Based upon review of the patient's progress and continued therapy plan, it is my medical opinion that Sachin Almanzar should continue physical therapy treatment at [facility] PH[facility]  [address] [place] [address]  [id number]  [phone].    Signature: __________________________________  Pamela Hammer MD    Timed:  Manual Therapy:         mins  61501;  Therapeutic Exercise:         mins  78933;     Neuromuscular Randy:        mins  02017;    Therapeutic Activity:     30     mins  85769;     Gait Training:           mins  60663;     Ultrasound:          mins  07931;  Iontophoresis:          mins  59564;    Dry Needling:          mins ;    Untimed:  Electrical Stimulation:         mins  03328 ( );  Mechanical Traction:   10      mins  22968;   Canalith Repositioning:        mins  85820;    Timed Treatment:   30   mins   Total Treatment:     45   mins

## 2021-10-08 ENCOUNTER — IMMUNIZATION (OUTPATIENT)
Dept: VACCINE CLINIC | Facility: HOSPITAL | Age: 71
End: 2021-10-08

## 2021-10-08 PROCEDURE — 91300 HC SARSCOV02 VAC 30MCG/0.3ML IM: CPT | Performed by: INTERNAL MEDICINE

## 2021-10-08 PROCEDURE — 0001A: CPT | Performed by: INTERNAL MEDICINE

## 2021-10-08 PROCEDURE — 0004A ADM SARSCOV2 30MCG/0.3ML BOOSTER: CPT | Performed by: INTERNAL MEDICINE

## 2021-10-12 ENCOUNTER — TREATMENT (OUTPATIENT)
Dept: PHYSICAL THERAPY | Facility: CLINIC | Age: 71
End: 2021-10-12

## 2021-10-12 DIAGNOSIS — M54.50 ACUTE MIDLINE LOW BACK PAIN, UNSPECIFIED WHETHER SCIATICA PRESENT: Primary | ICD-10-CM

## 2021-10-12 PROCEDURE — 97140 MANUAL THERAPY 1/> REGIONS: CPT | Performed by: PHYSICAL THERAPIST

## 2021-10-12 PROCEDURE — 97012 MECHANICAL TRACTION THERAPY: CPT | Performed by: PHYSICAL THERAPIST

## 2021-10-12 PROCEDURE — 97110 THERAPEUTIC EXERCISES: CPT | Performed by: PHYSICAL THERAPIST

## 2021-10-12 NOTE — PROGRESS NOTES
Physical Therapy Daily Progress Note      Patient: Sachin Almanzar   : 1950  Treatment Diagnosis:     ICD-10-CM ICD-9-CM   1. Acute midline low back pain, unspecified whether sciatica present  M54.50 724.2     Referring practitioner: Pamela Hammer MD  Date of Initial Visit: Type: THERAPY  Noted: 2021  Today's Date: 10/12/2021  Patient seen for 5 sessions         Sachni Almanzar reports:       Subjective   Patient reports he did not have increased pain following traction last session.  States his back did feel like it loosened up some.  States he is getting his home exercises in about every other day.    Objective   See Exercise, Manual, and Modality Logs for complete treatment.       Assessment/Plan  Patient had improved hamstring flexibility by 15 degree (SLR) post manual therapy as well as decreased discomfort with stretching.  He tolerated progression of lumbar traction with reports of decreased stiffness in his back following.    Progress per Plan of Care           Timed:  Manual Therapy:   16      mins  21509;  Therapeutic Exercise:   10      mins  01521;     Neuromuscular Randy:        mins  22971;    Therapeutic Activity:          mins  33914;     Gait Training:           mins  67485;     Ultrasound:          mins  85332;    Iontophoresis:          mins  83553;  Dry Needling:          mins  72295;  Dry Needling:          mins  67755;    Untimed:  Electrical Stimulation:         mins  26152 ( );  Mechanical Traction:   10      mins  87039;   Canalith Repositioning:      mins  29775;    Timed Treatment:   26   mins   Total Treatment:     36   mins    Ольга Preciado PT  Physical Therapist

## 2021-10-29 ENCOUNTER — TREATMENT (OUTPATIENT)
Dept: PHYSICAL THERAPY | Facility: CLINIC | Age: 71
End: 2021-10-29

## 2021-10-29 DIAGNOSIS — M54.50 ACUTE MIDLINE LOW BACK PAIN, UNSPECIFIED WHETHER SCIATICA PRESENT: Primary | ICD-10-CM

## 2021-10-29 PROCEDURE — 97012 MECHANICAL TRACTION THERAPY: CPT | Performed by: PHYSICAL THERAPIST

## 2021-10-29 PROCEDURE — 97530 THERAPEUTIC ACTIVITIES: CPT | Performed by: PHYSICAL THERAPIST

## 2021-10-29 PROCEDURE — 97110 THERAPEUTIC EXERCISES: CPT | Performed by: PHYSICAL THERAPIST

## 2021-10-29 NOTE — PROGRESS NOTES
Physical Therapy Daily Progress Note      Patient: Sachin Almanzar   : 1950  Treatment Diagnosis:     ICD-10-CM ICD-9-CM   1. Acute midline low back pain, unspecified whether sciatica present  M54.50 724.2     Referring practitioner: Pamela Hammer MD  Date of Initial Visit: Type: THERAPY  Noted: 2021  Today's Date: 10/29/2021  Patient seen for 6 sessions         Sachin Almanzar reports:     Subjective   Patient reports he has taken a couple road trips and did not have pain flares.  States he is getting his exercises in about every other day.    Objective   See Exercise, Manual, and Modality Logs for complete treatment.       Assessment/Plan  Patient continues to be challenged with core activation and dynamic stabilization.  He benefits from cueing and facilitation of exercises to ensure proper form and to prevent compensatory patterns.    Progress per Plan of Care and Progress strengthening /stabilization /functional activity           Timed:  Manual Therapy:         mins  42245;  Therapeutic Exercise:   30      mins  11995;     Neuromuscular Randy:        mins  19084;    Therapeutic Activity:    10      mins  01858;     Gait Training:           mins  83063;     Ultrasound:          mins  74749;    Iontophoresis:          mins  73314;  Dry Needling:          mins  83617;  Dry Needling:          mins  39934;    Untimed:  Electrical Stimulation:         mins  56434 ( );  Mechanical Traction:   10      mins  72866;   Canalith Repositioning:      mins  33363;    Timed Treatment:   40   mins   Total Treatment:     55   mins    Ольга Preciado PT  Physical Therapist

## 2021-11-05 ENCOUNTER — TREATMENT (OUTPATIENT)
Dept: PHYSICAL THERAPY | Facility: CLINIC | Age: 71
End: 2021-11-05

## 2021-11-05 DIAGNOSIS — M54.50 ACUTE MIDLINE LOW BACK PAIN, UNSPECIFIED WHETHER SCIATICA PRESENT: Primary | ICD-10-CM

## 2021-11-05 PROCEDURE — 97110 THERAPEUTIC EXERCISES: CPT | Performed by: PHYSICAL THERAPIST

## 2021-11-05 PROCEDURE — 97530 THERAPEUTIC ACTIVITIES: CPT | Performed by: PHYSICAL THERAPIST

## 2021-11-05 NOTE — PROGRESS NOTES
Physical Therapy Re-Assessment / Re-Certification & Discharge Summary        Patient: Sachin Almanzar   : 1950  Visit Diagnoses:     ICD-10-CM ICD-9-CM   1. Acute midline low back pain, unspecified whether sciatica present  M54.50 724.2     Referring practitioner: Pamela Hammer MD  Date of Initial Visit: Type: THERAPY  Noted: 2021  Today's Date: 2021  Patient seen for 7 sessions      Subjective:   Sachin Almanzar reports:   Subjective Questionnaire: Oswestry:   Clinical Progress: improved  Home Program Compliance: Yes  Treatment has included: therapeutic exercise, manual therapy, therapeutic activity and traction    Subjective Evaluation    Pain  Current pain ratin  At worst pain ratin       Patient reports he has no complaints.  States he is able to perform all tasks/acitivies without limitations from his back.  States he is able to walk at lease 5,000 steps each day with no problems.      Objective     Palpation   No TTP     Active Range of Motion      Lumbar   Flexion: 25 degrees   Extension: 40 degrees   Left lateral flexion: 15 degrees   Right lateral flexion: 18 degrees   Left rotation: 50 degrees   Right rotation: 50 degrees      Strength/Myotome Testing      Left Hip   Planes of Motion   Flexion: 4+     Right Hip   Planes of Motion   Flexion: 4+     Left Knee   Flexion: 5  Extension: 5     Right Knee   Flexion: 5  Extension: 5     Left Ankle/Foot   Dorsiflexion: 5  Plantar flexion: 4  Eversion: 5  Great toe extension: 4     Right Ankle/Foot   Dorsiflexion: 5  Plantar flexion: 3+  Eversion:5  Great toe extension: 4     Muscle Activation     Additional Muscle Activation Details  Fair core muscle activation.     Tests      Lumbar      Left   Negative passive SLR and quadrant.      Right   Negative passive SLR and quadrant.      Left Hip   Negative piriformis.   Modified Dillan: Negative.      Right Hip   Positive piriformis.   Modified Dillan: Negative.     Additional Tests  Details  SLR: L 60 degrees, R 60 degrees   Neutral Sacral alignment       Assessment/Plan  Patient demonstrates improved lumbar ROM, LE strength,  and sacral alignment.  His flexibility has improved although he continues to have tightness in LE muscles.  Oswestry score improved from 17/50 to 4/50.  Reviewed HEP and issued written instructions for complete program.  Reviewed proper exercise technique, jt. Protection, lifting mechanics and proper techniques for getting in/out of bed and car.    Progress toward previous goals: Mostly met    Goal Review  Short Term Goals (3-4 visits):  1.  Patient will have increased lumbar spine ROM to WFLs to allow for increased functional joint mobility.-met  2.  Patient will have increased core muscle activity to WFLs to allow for increased spinal stabilization and support.-met  3.  Patient will have symmetrical sacral alignment.-met  4.  Patient will have increased SLR to 70 degrees or better.-improved, not met    Long Term Goals (6 visits):  1.  Patient will be independent in performance of HEP for carryover upon discharge from skilled PT services.-met  2.  Patient will have improved Oswestry score of 10/50 or better.-met  3.  Patient will have LE strength of 4+/5 or better.-met except for bilateral PF  4.  Patient will demonstrate functional transitions i.e. sit to stand/supine to sit with proper posture/technique.-met        Recommendations: Discharge to [facility]    PT Signature: Ольга Preciado, PT      Based upon review of the patient's progress and continued therapy plan, it is my medical opinion that Sachin Almanzar should continue physical therapy treatment at [facility] PHYSICAL THERAPY  [address] [place] [id number] [phone].    Signature: __________________________________  Pamela Hammer MD    Timed:  Manual Therapy:         mins  45356;  Therapeutic Exercise:    30     mins  45151;     Neuromuscular Randy:        mins   84493;    Therapeutic Activity:     10     mins  92900;     Gait Training:           mins  53378;     Ultrasound:          mins  93232;  Iontophoresis:          mins  43315;    Dry Needling:          mins ;    Untimed:  Electrical Stimulation:         mins  35101 ( );  Mechanical Traction:         mins  30915;   Canalith Repositioning:        mins  35245;    Timed Treatment:   40   mins   Total Treatment:     40   mins

## 2021-12-13 RX ORDER — NIFEDIPINE 30 MG/1
30 TABLET, EXTENDED RELEASE ORAL DAILY
Qty: 90 TABLET | Refills: 3 | Status: SHIPPED | OUTPATIENT
Start: 2021-12-13 | End: 2023-01-16

## 2021-12-13 RX ORDER — NIFEDIPINE 60 MG/1
60 TABLET, FILM COATED, EXTENDED RELEASE ORAL
Qty: 90 TABLET | Refills: 2 | OUTPATIENT
Start: 2021-12-13

## 2021-12-13 RX ORDER — CARVEDILOL 6.25 MG/1
TABLET ORAL
Qty: 180 TABLET | Refills: 2 | Status: SHIPPED | OUTPATIENT
Start: 2021-12-13 | End: 2022-09-09

## 2021-12-14 DIAGNOSIS — E11.9 CONTROLLED TYPE 2 DIABETES MELLITUS WITHOUT COMPLICATION, UNSPECIFIED WHETHER LONG TERM INSULIN USE (HCC): Primary | ICD-10-CM

## 2021-12-14 DIAGNOSIS — I10 PRIMARY HYPERTENSION: ICD-10-CM

## 2021-12-14 DIAGNOSIS — E78.2 MIXED HYPERLIPIDEMIA: ICD-10-CM

## 2021-12-15 LAB
ALBUMIN SERPL-MCNC: 4.1 G/DL (ref 3.7–4.7)
ALBUMIN/GLOB SERPL: 1.6 {RATIO} (ref 1.2–2.2)
ALP SERPL-CCNC: 64 IU/L (ref 44–121)
ALT SERPL-CCNC: 30 IU/L (ref 0–44)
AST SERPL-CCNC: 22 IU/L (ref 0–40)
BILIRUB SERPL-MCNC: 0.7 MG/DL (ref 0–1.2)
BUN SERPL-MCNC: 19 MG/DL (ref 8–27)
BUN/CREAT SERPL: 14 (ref 10–24)
CALCIUM SERPL-MCNC: 9.4 MG/DL (ref 8.6–10.2)
CHLORIDE SERPL-SCNC: 106 MMOL/L (ref 96–106)
CHOLEST SERPL-MCNC: 118 MG/DL (ref 100–199)
CO2 SERPL-SCNC: 23 MMOL/L (ref 20–29)
CREAT SERPL-MCNC: 1.32 MG/DL (ref 0.76–1.27)
GLOBULIN SER CALC-MCNC: 2.5 G/DL (ref 1.5–4.5)
GLUCOSE SERPL-MCNC: 146 MG/DL (ref 65–99)
HBA1C MFR BLD: 5.9 % (ref 4.8–5.6)
HDLC SERPL-MCNC: 33 MG/DL
LDLC SERPL CALC-MCNC: 58 MG/DL (ref 0–99)
POTASSIUM SERPL-SCNC: 4.8 MMOL/L (ref 3.5–5.2)
PROT SERPL-MCNC: 6.6 G/DL (ref 6–8.5)
SODIUM SERPL-SCNC: 142 MMOL/L (ref 134–144)
TRIGL SERPL-MCNC: 155 MG/DL (ref 0–149)
VLDLC SERPL CALC-MCNC: 27 MG/DL (ref 5–40)

## 2021-12-21 ENCOUNTER — OFFICE VISIT (OUTPATIENT)
Dept: INTERNAL MEDICINE | Facility: CLINIC | Age: 71
End: 2021-12-21

## 2021-12-21 VITALS
DIASTOLIC BLOOD PRESSURE: 78 MMHG | SYSTOLIC BLOOD PRESSURE: 122 MMHG | BODY MASS INDEX: 34.93 KG/M2 | HEART RATE: 81 BPM | OXYGEN SATURATION: 98 % | HEIGHT: 70 IN | WEIGHT: 244 LBS

## 2021-12-21 DIAGNOSIS — E11.9 CONTROLLED TYPE 2 DIABETES MELLITUS WITHOUT COMPLICATION, UNSPECIFIED WHETHER LONG TERM INSULIN USE (HCC): Primary | ICD-10-CM

## 2021-12-21 DIAGNOSIS — I10 PRIMARY HYPERTENSION: ICD-10-CM

## 2021-12-21 DIAGNOSIS — E78.2 MIXED HYPERLIPIDEMIA: ICD-10-CM

## 2021-12-21 PROCEDURE — 99214 OFFICE O/P EST MOD 30 MIN: CPT | Performed by: INTERNAL MEDICINE

## 2021-12-21 NOTE — PROGRESS NOTES
Subjective     Sachin Almanzar is a 71 y.o. male who presents with   Chief Complaint   Patient presents with   • Diabetes   • Hypertension   • Hyperlipidemia       History of Present Illness     The following data was reviewed by: Yvonne Chacko MD on 12/21/2021:  Common labs    Common Labsle 6/1/21 8/31/21 8/31/21 8/31/21 8/31/21 12/14/21 12/14/21 12/14/21     1102 1102 1102 1102 0833 0833 0833   Glucose   100 (A)   146 (A)     BUN   20   19     Creatinine   1.34 (A)   1.32 (A)     eGFR Non  Am   52 (A)   53 (A)     eGFR  Am   63   61     Sodium   142   142     Potassium   4.8   4.8     Chloride   105   106     Calcium   9.5   9.4     Total Protein   6.6   6.6     Albumin   4.40   4.1     Total Bilirubin   1.0   0.7     Alkaline Phosphatase   65   64     AST (SGOT)   25   22     ALT (SGPT)   32   30     WBC  5.21         Hemoglobin  15.8         Hematocrit  45.0         Platelets  233         Total Cholesterol    95   118    Triglycerides    130   155 (A)    HDL Cholesterol    28 (A)   33 (A)    LDL Cholesterol     44   58    Hemoglobin A1C 5.70 (A)    5.60   5.9 (A)   (A) Abnormal value       Comments are available for some flowsheets but are not being displayed.           Dm-2 with CKD3a.  Excellent control.  Kidneys are stable.   HTN.  Control is good.  HLD.  LDL control is good.      Review of Systems   Constitutional: Negative.    Respiratory: Negative.    Cardiovascular: Negative.        The following portions of the patient's history were reviewed and updated as appropriate: allergies, current medications and problem list.    Patient Active Problem List    Diagnosis Date Noted   • Diabetes mellitus type II, controlled, with no complications ([facility]) 08/23/2021   • Stage 3 chronic kidney disease (HCC) 01/10/2019   • Multiple renal cysts 05/07/2018   • Allergy to shellfish 04/19/2017   • Venous insufficiency of both lower extremities 04/21/2016   • Erectile dysfunction 02/25/2013   •  "Gastroesophageal reflux disease 02/25/2013   • Allergic rhinitis 07/10/2012   • Hyperlipidemia 07/10/2012   • Hypertension 07/10/2012   • Nephrolithiasis 07/10/2012     Note Last Updated: 7/27/2018     Overview:   Recurrent, Fall of 2016 (Dr. Muniz)  Had shockwave lithotripsy    Overview:   Recurrent, Fall of 2016 (Dr. Muniz)  Had shockwave lithotripsy     • Obstructive sleep apnea on CPAP 07/10/2012   • History of bladder cancer 07/10/2012     Note Last Updated: 8/26/2019     Overview:   3/2011, Dr. Muniz, TURBT, BCG and infusion of chemo         Current Outpatient Medications on File Prior to Visit   Medication Sig Dispense Refill   • atorvastatin (LIPITOR) 20 MG tablet TAKE 1 TABLET EVERY NIGHT 90 tablet 3   • carvedilol (COREG) 6.25 MG tablet TAKE 1 TABLET BY MOUTH EVERY 12 HOURS 180 tablet 2   • cyclobenzaprine (FLEXERIL) 10 MG tablet Take 1 tablet by mouth 3 (Three) Times a Day As Needed for Muscle Spasms. 30 tablet 0   • EPINEPHrine (EPIPEN) 0.3 MG/0.3ML solution auto-injector injection Inject 0.3 mL into the appropriate muscle as directed by prescriber 1 (One) Time As Needed (anaphylasis) for up to 6 doses. 1 each 5   • famotidine (PEPCID) 20 MG tablet Take 20 mg by mouth Daily.     • fexofenadine (ALLEGRA) 180 MG tablet Take 180 mg by mouth Daily.     • Lancets (OneTouch Delica Plus [id number]) misc Daily. for testing     • melatonin 1 MG tablet Take 1 mg by mouth At Night As Needed for Sleep.     • NIFEdipine XL (PROCARDIA XL) 30 MG 24 hr tablet Take 1 tablet by mouth Daily. 90 tablet 3   • OneTouch Verio test strip Daily. for testing     • sildenafil (VIAGRA) 100 MG tablet Take 100 mg by mouth As Needed for erectile dysfunction.       No current facility-administered medications on file prior to visit.       Objective     /78   Pulse 81   Ht 177.8 cm (70\")   Wt 111 kg (244 lb)   SpO2 98%   BMI 35.01 kg/m²     Physical Exam  Constitutional:       Appearance: He is well-developed.   HENT:      " Head: Atraumatic.   Cardiovascular:      Rate and Rhythm: Normal rate and regular rhythm.      Heart sounds: Normal heart sounds.   Pulmonary:      Effort: Pulmonary effort is normal.      Breath sounds: Normal breath sounds.   Skin:     General: Skin is warm and dry.   Neurological:      Mental Status: He is alert and oriented to person, place, and time.         Assessment/Plan   Diagnoses and all orders for this visit:    1. Controlled type 2 diabetes mellitus without complication, unspecified whether long term insulin use (HCC) (Primary)    2. Primary hypertension    3. Mixed hyperlipidemia        Discussion    Dm-2.  Continue healthy diet.   We discussed the importance of NSAID avoidance.  We discussed the importance of water consumption.  We discussed avoiding constrast studies like CTs whenever possible.    HTN.  Control is good.  The patient is advised to continue current dosage of Procardia  HLD.   Control is good.  The patient is advised to continue current dosage of atorvastatin.           Future Appointments   Date Time Provider Department Center   7/25/2022 11:00 AM Shorty Zaidi MD [facility]

## 2022-04-12 ENCOUNTER — IMMUNIZATION (OUTPATIENT)
Dept: VACCINE CLINIC | Facility: HOSPITAL | Age: 72
End: 2022-04-12

## 2022-04-12 DIAGNOSIS — Z23 NEED FOR VACCINATION: Primary | ICD-10-CM

## 2022-04-12 PROCEDURE — 0054A HC ADM SARSCV2 30MCG TRS-SUCR BOOSTER: CPT | Performed by: INTERNAL MEDICINE

## 2022-04-12 PROCEDURE — 91305 HC SARSCOV2 VAC 30 MCG TRS-SUCR PFIZER: CPT | Performed by: INTERNAL MEDICINE

## 2022-04-18 RX ORDER — ATORVASTATIN CALCIUM 20 MG/1
20 TABLET, FILM COATED ORAL NIGHTLY
Qty: 90 TABLET | Refills: 3 | Status: SHIPPED | OUTPATIENT
Start: 2022-04-18 | End: 2022-04-21 | Stop reason: SDUPTHER

## 2022-04-21 RX ORDER — ATORVASTATIN CALCIUM 20 MG/1
20 TABLET, FILM COATED ORAL NIGHTLY
Qty: 90 TABLET | Refills: 3 | Status: SHIPPED | OUTPATIENT
Start: 2022-04-21 | End: 2022-05-06 | Stop reason: SDUPTHER

## 2022-05-06 RX ORDER — ATORVASTATIN CALCIUM 20 MG/1
20 TABLET, FILM COATED ORAL NIGHTLY
Qty: 90 TABLET | Refills: 3 | Status: SHIPPED | OUTPATIENT
Start: 2022-05-06 | End: 2023-03-29

## 2022-06-01 DIAGNOSIS — Z12.5 SCREENING FOR PROSTATE CANCER: ICD-10-CM

## 2022-06-01 DIAGNOSIS — E11.9 CONTROLLED TYPE 2 DIABETES MELLITUS WITHOUT COMPLICATION, UNSPECIFIED WHETHER LONG TERM INSULIN USE: ICD-10-CM

## 2022-06-01 DIAGNOSIS — Z12.5 PROSTATE CANCER SCREENING: ICD-10-CM

## 2022-06-01 DIAGNOSIS — I10 PRIMARY HYPERTENSION: ICD-10-CM

## 2022-06-01 DIAGNOSIS — E78.2 MIXED HYPERLIPIDEMIA: Primary | ICD-10-CM

## 2022-06-02 LAB
ALBUMIN SERPL-MCNC: 4 G/DL (ref 3.7–4.7)
ALBUMIN/GLOB SERPL: 1.7 {RATIO} (ref 1.2–2.2)
ALP SERPL-CCNC: 64 IU/L (ref 44–121)
ALT SERPL-CCNC: 35 IU/L (ref 0–44)
APPEARANCE UR: CLEAR
AST SERPL-CCNC: 25 IU/L (ref 0–40)
BACTERIA #/AREA URNS HPF: NORMAL /[HPF]
BASOPHILS # BLD AUTO: 0.1 X10E3/UL (ref 0–0.2)
BASOPHILS NFR BLD AUTO: 1 %
BILIRUB SERPL-MCNC: 0.8 MG/DL (ref 0–1.2)
BILIRUB UR QL STRIP: NEGATIVE
BUN SERPL-MCNC: 23 MG/DL (ref 8–27)
BUN/CREAT SERPL: 17 (ref 10–24)
CALCIUM SERPL-MCNC: 9.6 MG/DL (ref 8.6–10.2)
CASTS URNS QL MICRO: NORMAL /LPF
CHLORIDE SERPL-SCNC: 104 MMOL/L (ref 96–106)
CHOLEST SERPL-MCNC: 121 MG/DL (ref 100–199)
CO2 SERPL-SCNC: 20 MMOL/L (ref 20–29)
COLOR UR: YELLOW
CREAT SERPL-MCNC: 1.32 MG/DL (ref 0.76–1.27)
EGFRCR SERPLBLD CKD-EPI 2021: 57 ML/MIN/1.73
EOSINOPHIL # BLD AUTO: 0.2 X10E3/UL (ref 0–0.4)
EOSINOPHIL NFR BLD AUTO: 3 %
EPI CELLS #/AREA URNS HPF: NORMAL /HPF (ref 0–10)
ERYTHROCYTE [DISTWIDTH] IN BLOOD BY AUTOMATED COUNT: 13.4 % (ref 11.6–15.4)
GLOBULIN SER CALC-MCNC: 2.4 G/DL (ref 1.5–4.5)
GLUCOSE SERPL-MCNC: 129 MG/DL (ref 65–99)
GLUCOSE UR QL STRIP: NEGATIVE
HBA1C MFR BLD: 6.1 % (ref 4.8–5.6)
HCT VFR BLD AUTO: 44.9 % (ref 37.5–51)
HDLC SERPL-MCNC: 28 MG/DL
HGB BLD-MCNC: 15.2 G/DL (ref 13–17.7)
HGB UR QL STRIP: NEGATIVE
IMM GRANULOCYTES # BLD AUTO: 0 X10E3/UL (ref 0–0.1)
IMM GRANULOCYTES NFR BLD AUTO: 1 %
KETONES UR QL STRIP: NEGATIVE
LDLC SERPL CALC-MCNC: 58 MG/DL (ref 0–99)
LEUKOCYTE ESTERASE UR QL STRIP: NEGATIVE
LYMPHOCYTES # BLD AUTO: 0.9 X10E3/UL (ref 0.7–3.1)
LYMPHOCYTES NFR BLD AUTO: 17 %
MCH RBC QN AUTO: 32.1 PG (ref 26.6–33)
MCHC RBC AUTO-ENTMCNC: 33.9 G/DL (ref 31.5–35.7)
MCV RBC AUTO: 95 FL (ref 79–97)
MICRO URNS: NORMAL
MICRO URNS: NORMAL
MONOCYTES # BLD AUTO: 0.7 X10E3/UL (ref 0.1–0.9)
MONOCYTES NFR BLD AUTO: 12 %
NEUTROPHILS # BLD AUTO: 3.7 X10E3/UL (ref 1.4–7)
NEUTROPHILS NFR BLD AUTO: 66 %
NITRITE UR QL STRIP: NEGATIVE
PH UR STRIP: 6.5 [PH] (ref 5–7.5)
PLATELET # BLD AUTO: 201 X10E3/UL (ref 150–450)
POTASSIUM SERPL-SCNC: 4.3 MMOL/L (ref 3.5–5.2)
PROT SERPL-MCNC: 6.4 G/DL (ref 6–8.5)
PROT UR QL STRIP: NEGATIVE
PSA SERPL-MCNC: 0.5 NG/ML (ref 0–4)
RBC # BLD AUTO: 4.73 X10E6/UL (ref 4.14–5.8)
RBC #/AREA URNS HPF: NORMAL /HPF (ref 0–2)
SODIUM SERPL-SCNC: 139 MMOL/L (ref 134–144)
SP GR UR STRIP: 1.02 (ref 1–1.03)
TRIGL SERPL-MCNC: 217 MG/DL (ref 0–149)
TSH SERPL DL<=0.005 MIU/L-ACNC: 2.2 UIU/ML (ref 0.45–4.5)
URINALYSIS REFLEX: NORMAL
UROBILINOGEN UR STRIP-MCNC: 0.2 MG/DL (ref 0.2–1)
VLDLC SERPL CALC-MCNC: 35 MG/DL (ref 5–40)
WBC # BLD AUTO: 5.5 X10E3/UL (ref 3.4–10.8)
WBC #/AREA URNS HPF: NORMAL /HPF (ref 0–5)

## 2022-06-02 RX ORDER — BLOOD SUGAR DIAGNOSTIC
STRIP MISCELLANEOUS
Qty: 100 EACH | Refills: 5 | Status: SHIPPED | OUTPATIENT
Start: 2022-06-02

## 2022-06-06 ENCOUNTER — OFFICE VISIT (OUTPATIENT)
Dept: INTERNAL MEDICINE | Facility: CLINIC | Age: 72
End: 2022-06-06

## 2022-06-06 VITALS
DIASTOLIC BLOOD PRESSURE: 72 MMHG | WEIGHT: 244 LBS | SYSTOLIC BLOOD PRESSURE: 120 MMHG | HEIGHT: 70 IN | HEART RATE: 71 BPM | OXYGEN SATURATION: 98 % | BODY MASS INDEX: 34.93 KG/M2

## 2022-06-06 DIAGNOSIS — E11.9 CONTROLLED TYPE 2 DIABETES MELLITUS WITHOUT COMPLICATION, WITHOUT LONG-TERM CURRENT USE OF INSULIN: ICD-10-CM

## 2022-06-06 DIAGNOSIS — E78.2 MIXED HYPERLIPIDEMIA: ICD-10-CM

## 2022-06-06 DIAGNOSIS — I10 PRIMARY HYPERTENSION: ICD-10-CM

## 2022-06-06 DIAGNOSIS — M20.42 HAMMERTOE OF LEFT FOOT: ICD-10-CM

## 2022-06-06 DIAGNOSIS — N18.31 STAGE 3A CHRONIC KIDNEY DISEASE: ICD-10-CM

## 2022-06-06 DIAGNOSIS — Z00.00 MEDICARE ANNUAL WELLNESS VISIT, SUBSEQUENT: Primary | ICD-10-CM

## 2022-06-06 DIAGNOSIS — L84 PRE-ULCERATIVE CALLUSES: ICD-10-CM

## 2022-06-06 PROCEDURE — G0439 PPPS, SUBSEQ VISIT: HCPCS | Performed by: INTERNAL MEDICINE

## 2022-06-06 PROCEDURE — 1126F AMNT PAIN NOTED NONE PRSNT: CPT | Performed by: INTERNAL MEDICINE

## 2022-06-06 PROCEDURE — 1159F MED LIST DOCD IN RCRD: CPT | Performed by: INTERNAL MEDICINE

## 2022-06-06 PROCEDURE — 1170F FXNL STATUS ASSESSED: CPT | Performed by: INTERNAL MEDICINE

## 2022-06-06 PROCEDURE — 99214 OFFICE O/P EST MOD 30 MIN: CPT | Performed by: INTERNAL MEDICINE

## 2022-06-06 NOTE — PROGRESS NOTES
The ABCs of the Annual Wellness Visit  Subsequent Medicare Wellness Visit    Chief Complaint   Patient presents with   • Medicare Wellness-subsequent   • Diabetes   • Hypertension   • Hyperlipidemia      Subjective    History of Present Illness:  Sachin Almanzar is a 71 y.o. male who presents for a Subsequent Medicare Wellness Visit.    The following data was reviewed by: Yvonne Chacko MD on 06/06/2022:  Common labs    Common Labsle 8/31/21 8/31/21 8/31/21 8/31/21 12/14/21 12/14/21 12/14/21 6/1/22 6/1/22 6/1/22 6/1/22 6/1/22    1102 1102 1102 1102 0833 0833 0833 0810 0810 0810 0810 0810   Glucose  100 (A)   146 (A)    129 (A)      BUN  20   19    23      Creatinine  1.34 (A)   1.32 (A)    1.32 (A)      eGFR Non African Am  52 (A)   53 (A)          eGFR  Am  63   61          Sodium  142   142    139      Potassium  4.8   4.8    4.3      Chloride  105   106    104      Calcium  9.5   9.4    9.6      Total Protein  6.6   6.6    6.4      Albumin  4.40   4.1    4.0      Total Bilirubin  1.0   0.7    0.8      Alkaline Phosphatase  65   64    64      AST (SGOT)  25   22    25      ALT (SGPT)  32   30    35      WBC 5.21       5.5       Hemoglobin 15.8       15.2       Hematocrit 45.0       44.9       Platelets 233       201       Total Cholesterol   95   118    121     Triglycerides   130   155 (A)    217 (A)     HDL Cholesterol   28 (A)   33 (A)    28 (A)     LDL Cholesterol    44   58    58     Hemoglobin A1C    5.60   5.9 (A)    6.1 (A)    PSA            0.5   (A) Abnormal value       Comments are available for some flowsheets but are not being displayed.           Dm-2.  Control is good.  HTN.  BP control is good.  CKD-3a.  Numbers are stable.      The following portions of the patient's history were reviewed and   updated as appropriate: allergies, current medications, past family history, past medical history, past social history, past surgical history and problem list.    Compared to one year ago, the  patient feels his physical   health is the same.    Compared to one year ago, the patient feels his mental   health is the same.    Recent Hospitalizations:  He was not admitted to the hospital during the last year.       Current Medical Providers:  Patient Care Team:  Yvonne Chacko MD as PCP - General (Internal Medicine)  René Muniz MD as Consulting Physician (Urology)  Rocael Steen MD as Consulting Physician (Dermatology)  Baljeet Roach MD as Consulting Physician (Allergy)  Sumanth Kamara OD (Optometry)    Outpatient Medications Prior to Visit   Medication Sig Dispense Refill   • atorvastatin (LIPITOR) 20 MG tablet Take 1 tablet by mouth Every Night. 90 tablet 3   • carvedilol (COREG) 6.25 MG tablet TAKE 1 TABLET BY MOUTH EVERY 12 HOURS 180 tablet 2   • cyclobenzaprine (FLEXERIL) 10 MG tablet Take 1 tablet by mouth 3 (Three) Times a Day As Needed for Muscle Spasms. 30 tablet 0   • EPINEPHrine (EPIPEN) 0.3 MG/0.3ML solution auto-injector injection Inject 0.3 mL into the appropriate muscle as directed by prescriber 1 (One) Time As Needed (anaphylasis) for up to 6 doses. 1 each 5   • famotidine (PEPCID) 20 MG tablet Take 20 mg by mouth Daily.     • fexofenadine (ALLEGRA) 180 MG tablet Take 180 mg by mouth Daily.     • Lancets (OneTouch Delica Plus [id number]) misc Daily. for testing     • melatonin 1 MG tablet Take 1 mg by mouth At Night As Needed for Sleep.     • NIFEdipine XL (PROCARDIA XL) 30 MG 24 hr tablet Take 1 tablet by mouth Daily. 90 tablet 3   • OneTouch Verio test strip USE ONCE DAILY FOR TYPE 2 DIABETES. DX: E11.9 100 each 5   • sildenafil (VIAGRA) 100 MG tablet Take 100 mg by mouth As Needed for erectile dysfunction.       No facility-administered medications prior to visit.       No opioid medication identified on active medication list. I have reviewed chart for other potential  high risk medication/s and harmful drug interactions in the elderly.          Aspirin is not  "on active medication list.  Aspirin use is not indicated based on review of current medical condition/s. Risk of harm outweighs potential benefits.  .    Patient Active Problem List   Diagnosis   • Allergic rhinitis   • Erectile dysfunction   • Gastroesophageal reflux disease   • Hyperlipidemia   • Hypertension   • Nephrolithiasis   • Obstructive sleep apnea on CPAP   • Venous insufficiency of both lower extremities   • Allergy to shellfish   • Multiple renal cysts   • Stage 3 chronic kidney disease (HCC)   • History of bladder cancer   • Diabetes mellitus type II, controlled, with no complications (HCC)     Advance Care Planning  Advance Directive is not on file.  ACP discussion was held with the patient during this visit. Patient has an advance directive (not in EMR), copy requested.    Review of Systems   Respiratory: Negative.    Cardiovascular: Negative.         Objective    Vitals:    06/06/22 1128   BP: 120/72   Pulse: 71   SpO2: 98%   Weight: 111 kg (244 lb)   Height: 177.8 cm (70\")   PainSc: 0-No pain     Body mass index is 35.01 kg/m².  Class 2 Severe Obesity (BMI >=35 and <=39.9). Obesity-related health conditions include the following: diabetes mellitus. Obesity is unchanged. BMI is is above average; BMI management plan is completed. We discussed portion control and increasing exercise.    Does the patient have evidence of cognitive impairment? No    Physical Exam  Constitutional:       Appearance: He is well-developed.   HENT:      Head: Normocephalic and atraumatic.      Right Ear: Hearing and tympanic membrane normal.      Left Ear: Hearing and tympanic membrane normal.      Mouth/Throat:      Pharynx: No posterior oropharyngeal erythema.   Neck:      Thyroid: No thyromegaly.   Cardiovascular:      Rate and Rhythm: Normal rate and regular rhythm.      Heart sounds: Normal heart sounds. No murmur heard.  Pulmonary:      Effort: Pulmonary effort is normal.      Breath sounds: Normal breath sounds. "   Abdominal:      General: There is no distension.      Palpations: Abdomen is soft.      Tenderness: There is no abdominal tenderness.   Musculoskeletal:      Cervical back: Neck supple.      Left foot: Prominent metatarsal heads present.   Feet:      Right foot:      Skin integrity: Callus present.      Comments:     Lymphadenopathy:      Cervical: No cervical adenopathy.   Skin:     General: Skin is warm and dry.   Neurological:      Mental Status: He is alert and oriented to person, place, and time.   Psychiatric:         Speech: Speech normal.         Behavior: Behavior normal.         Thought Content: Thought content normal.         Judgment: Judgment normal.       Lab Results   Component Value Date    CHLPL 121 06/01/2022    TRIG 217 (H) 06/01/2022    HDL 28 (L) 06/01/2022    LDL 58 06/01/2022    VLDL 35 06/01/2022    HGBA1C 6.1 (H) 06/01/2022            HEALTH RISK ASSESSMENT    Smoking Status:  Social History     Tobacco Use   Smoking Status Never Smoker   Smokeless Tobacco Never Used   Tobacco Comment    no history of smoking     Alcohol Consumption:  Social History     Substance and Sexual Activity   Alcohol Use Yes   • Alcohol/week: 6.0 standard drinks   • Types: 6 Cans of beer per week    Comment: 6 pk a week     Fall Risk Screen:    [facility], and patient is at LOW risk for falls.Assessment completed on:6/6/2022    Depression Screening:  PHQ-2/PHQ-9 Depression Screening 6/6/2022   Retired PHQ-9 Total Score -   Retired Total Score -   Little Interest or Pleasure in Doing Things 0-->not at all   Feeling Down, Depressed or Hopeless 0-->not at all   PHQ-9: Brief Depression Severity Measure Score 0       Health Habits and Functional and Cognitive Screening:  Functional & Cognitive Status 6/6/2022   Do you have difficulty preparing food and eating? No   Do you have difficulty bathing yourself, getting dressed or grooming yourself? No   Do you have difficulty using the toilet?  No   Do you have difficulty moving around from place to place? No   Do you have trouble with steps or getting out of a bed or a chair? No   Current Diet Well Balanced Diet   Dental Exam Up to date        Dental Exam Comment -   Eye Exam Up to date        Eye Exam Comment -   Exercise (times per week) 4 times per week   Current Exercises Include Walking   Current Exercise Activities Include -   Do you need help using the phone?  No   Are you deaf or do you have serious difficulty hearing?  No   Do you need help with transportation? No   Do you need help shopping? No   Do you need help preparing meals?  No   Do you need help with housework?  No   Do you need help with laundry? No   Do you need help taking your medications? No   Do you need help managing money? No   Do you ever drive or ride in a car without wearing a seat belt? No   Have you felt unusual stress, anger or loneliness in the last month? No   Who do you live with? Spouse   If you need help, do you have trouble finding someone available to you? No   Have you been bothered in the last four weeks by sexual problems? No   Do you have difficulty concentrating, remembering or making decisions? No       Age-appropriate Screening Schedule:  Refer to the list below for future screening recommendations based on patient's age, sex and/or medical conditions. Orders for these recommended tests are listed in the plan section. The patient has been provided with a written plan.    Health Maintenance   Topic Date Due   • URINE MICROALBUMIN  Never done   • DIABETIC EYE EXAM  03/21/2019   • INFLUENZA VACCINE  08/01/2022   • HEMOGLOBIN A1C  12/01/2022   • TDAP/TD VACCINES (2 - Td or Tdap) 02/25/2023   • LIPID PANEL  06/01/2023   • DIABETIC FOOT EXAM  06/06/2023   • ZOSTER VACCINE  Completed              Assessment & Plan   CMS Preventative Services Quick Reference  Risk Factors Identified During Encounter  Obesity/Overweight   The above risks/problems have been discussed  with the patient.  Follow up actions/plans if indicated are seen below in the Assessment/Plan Section.  Pertinent information has been shared with the patient in the After Visit Summary.    Diagnoses and all orders for this visit:    1. Medicare annual wellness visit, subsequent (Primary)    2. Controlled type 2 diabetes mellitus without complication, without long-term current use of insulin (HCC)  -     Ambulatory Referral to Podiatry    3. Primary hypertension    4. Mixed hyperlipidemia    5. Stage 3a chronic kidney disease (HCC)    6. Pre-ulcerative calluses  -     Ambulatory Referral to Podiatry    7. Hammertoe of left foot  -     Ambulatory Referral to Podiatry    Dm-2.  Continue healthy diet.  HTN.  Control is good.  The patient is advised to continue current dosage of coreg.  CKD3a.   We discussed the importance of NSAID avoidance.  We discussed the importance of water consumption.    Patient needs special shoes because he is a diabetic and has hammertoes and calluses.          Follow Up:   Return in about 6 months (around 12/6/2022) for Recheck.     An After Visit Summary and PPPS were made available to the patient.

## 2022-06-06 NOTE — PATIENT INSTRUCTIONS
Medicare Wellness  Personal Prevention Plan of Service     Date of Office Visit:    Encounter Provider:  Yvonne Chacko MD  Place of Service:  [facility]  Patient Name: Sachin Almanzar  :  1950    As part of the Medicare Wellness portion of your visit today, we are providing you with this personalized preventive plan of services (PPPS). This plan is based upon recommendations of the [place] Preventive Services Task Force (USPSTF) and the Advisory Committee on Immunization Practices (ACIP).    This lists the preventive care services that should be considered, and provides dates of when you are due. Items listed as completed are up-to-date and do not require any further intervention.    Health Maintenance   Topic Date Due    URINE MICROALBUMIN  Never done    DIABETIC EYE EXAM  2019    ANNUAL WELLNESS VISIT  2022    INFLUENZA VACCINE  2022    HEMOGLOBIN A1C  2022    COLORECTAL CANCER SCREENING  2022    TDAP/TD VACCINES (2 - Td or Tdap) 2023    LIPID PANEL  2023    DIABETIC FOOT EXAM  2023    HEPATITIS C SCREENING  Completed    COVID-19 Vaccine  Completed    Pneumococcal Vaccine 65+  Completed    ZOSTER VACCINE  Completed       No orders of the defined types were placed in this encounter.      No follow-ups on file.

## 2022-08-02 ENCOUNTER — OFFICE VISIT (OUTPATIENT)
Dept: CARDIOLOGY | Facility: CLINIC | Age: 72
End: 2022-08-02

## 2022-08-02 VITALS
HEART RATE: 82 BPM | DIASTOLIC BLOOD PRESSURE: 70 MMHG | BODY MASS INDEX: 35.62 KG/M2 | OXYGEN SATURATION: 98 % | SYSTOLIC BLOOD PRESSURE: 130 MMHG | HEIGHT: 70 IN | WEIGHT: 248.8 LBS

## 2022-08-02 DIAGNOSIS — I10 PRIMARY HYPERTENSION: Primary | ICD-10-CM

## 2022-08-02 DIAGNOSIS — I47.1 PAROXYSMAL SVT (SUPRAVENTRICULAR TACHYCARDIA): ICD-10-CM

## 2022-08-02 DIAGNOSIS — E78.2 MIXED HYPERLIPIDEMIA: ICD-10-CM

## 2022-08-02 PROCEDURE — 99214 OFFICE O/P EST MOD 30 MIN: CPT | Performed by: NURSE PRACTITIONER

## 2022-08-02 PROCEDURE — 93000 ELECTROCARDIOGRAM COMPLETE: CPT | Performed by: NURSE PRACTITIONER

## 2022-08-02 NOTE — PROGRESS NOTES
[facility] Follow Up Office Note     Encounter Date:22  Patient:Sachin Almanzar  :1950  MRN:[id number]      Chief Complaint:   Chief Complaint   Patient presents with   • Edema     Ankles     • Fatigue   • Follow-up         History of Presenting Illness:        Sachin Almanzar is a 72 y.o. male who is here for follow-up of hypertension.  He is a patient of Dr. Zaidi.    Patient has known hypertension, hyperlipidemia, diabetes and distant history of SVT.  Patient had extensive evaluation for syncopal episode in 2018.  Echo revealed normal LV function with no significant valvular abnormalities, treadmill stress test with no evidence of ischemia, Holter monitor relatively benign with only rare atrial ectopic beats.    Patient was last seen in the office by Dr. Zaidi in 2021.  At this time he was doing well from a cardiac perspective.  His blood pressure had been running lower and his nifedipine dose was reduced.    Patient has no complaints today.  He walks approximately a mile daily.  He denies shortness of breath, chest tightness, palpitations, lower extremity edema or orthopnea.  He has not had any dizzy spells or syncopal events.  He has gained weight over the past year.  He is discussed this with his primary care and is trying to make modifications to watch his diet and increase activity.  He is a diabetic, his A1c has been relatively well controlled.  His lipid panel from  also demonstrates good control.    Review of Systems:  Review of Systems   Cardiovascular: Negative for chest pain, dyspnea on exertion, leg swelling, orthopnea and palpitations.   Respiratory: Negative for shortness of breath.        Current Outpatient Medications on File Prior to Visit   Medication Sig Dispense Refill   • atorvastatin (LIPITOR) 20 MG tablet Take 1 tablet by mouth Every Night. 90 tablet 3   • carvedilol (COREG) 6.25 MG tablet TAKE 1 TABLET BY MOUTH EVERY 12 HOURS 180 tablet 2   •  EPINEPHrine (EPIPEN) 0.3 MG/0.3ML solution auto-injector injection Inject 0.3 mL into the appropriate muscle as directed by prescriber 1 (One) Time As Needed (anaphylasis) for up to 6 doses. 1 each 5   • famotidine (PEPCID) 20 MG tablet Take 20 mg by mouth Daily.     • fexofenadine (ALLEGRA) 180 MG tablet Take 180 mg by mouth Daily.     • Lancets (OneTouch Delica Plus [id number]) misc Daily. for testing     • melatonin 1 MG tablet Take 1 mg by mouth At Night As Needed for Sleep.     • NIFEdipine XL (PROCARDIA XL) 30 MG 24 hr tablet Take 1 tablet by mouth Daily. 90 tablet 3   • OneTouch Verio test strip USE ONCE DAILY FOR TYPE 2 DIABETES. DX: E11.9 100 each 5   • sildenafil (VIAGRA) 100 MG tablet Take 100 mg by mouth As Needed for erectile dysfunction.     • cyclobenzaprine (FLEXERIL) 10 MG tablet Take 1 tablet by mouth 3 (Three) Times a Day As Needed for Muscle Spasms. 30 tablet 0     No current facility-administered medications on file prior to visit.       Allergies   Allergen Reactions   • Ace Inhibitors    • Shellfish-Derived Products Hives     Throat swelling, itching       Past Medical History:   Diagnosis Date   • Anemia    • Basal cell carcinoma 07/10/2012    Overview:  NOSE  Overview:  NOSE   • Basal cell carcinoma of skin 07/10/2012    Overview:  NOSE   • Bladder cancer (HCC) 07/10/2012    Overview:  3/2011, Dr. Muniz, TURBT, BCG and infusion of chemo  Overview:  3/2011, Dr. 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 Muniz, TURBT, BCG and infusion of chemo   • Melanoma  in situ of face (HCC) 2016    Overview:  Removed per Dr. Steen (derm) on 2016   • Obesity    • Renal insufficiency    • Sleep apnea with use of continuous positive airway pressure (CPAP)    • SVT (supraventricular tachycardia) (HCC)        Past Surgical History:   Procedure Laterality Date   • ADENOIDECTOMY     • APPENDECTOMY     • BLADDER TUMOR EXCISION     • CLAVICLE OPEN REDUCTION INTERNAL FIXATION Right 2018    Procedure: CLAVICLE OPEN REDUCTION INTERNAL FIXATION;  Surgeon: Cuate Elias MD;  Location: [facility] MAIN OR;  Service: Orthopedics   • COLONOSCOPY N/A 2017    Procedure: COLONOSCOPY INTO CECUM WITH COLD BX POLYPECTOMY ;  Surgeon: Domingo Cantrell MD;  Location: [facility] ENDOSCOPY;  Service:    • COSMETIC SURGERY      Nose - melanoma removal   • MOHS SURGERY      2 bcc   • TONSILLECTOMY         Social History     Socioeconomic History   • Marital status:      Spouse name: Julita   • Number of children: 1   Tobacco Use   • Smoking status: Never Smoker   • Smokeless tobacco: Never Used   • Tobacco comment: no history of smoking   Vaping Use   • Vaping Use: Never used   Substance and Sexual Activity   • Alcohol use: Yes     Alcohol/week: 6.0 standard drinks     Types: 6 Cans of beer per week     Comment: 6 pk a week   • Drug use: No   • Sexual activity: Yes     Partners: Female     Birth control/protection: None       Family History   Problem Relation Age of Onset   • Heart attack Mother          age 55   • Heart disease Mother          at age of 54   • Early death Father         unknown cause   • Diabetes Maternal Grandmother    • Hypertension Paternal Uncle        The following portions of the patient's history were reviewed and updated as appropriate: allergies, current medications, past family history, past medical history, past social history, past surgical history and problem list.       Objective:       Vitals:    22 0909   BP: 130/70   BP Location: Left  "arm   Patient Position: Sitting   Cuff Size: Adult   Pulse: 82   SpO2: 98%   Weight: 113 kg (248 lb 12.8 oz)   Height: 177.8 cm (70\")         Physical Exam:  Constitutional: Well appearing, well developed, no acute distress   HENT: Oropharynx clear and membrane moist  Eyes: Normal conjunctiva, no sclera icterus  Neck: Supple, no carotid bruit bilaterally  Cardiovascular: Regular rate and rhythm, No Murmur, No bilateral lower extremity edema  Pulmonary: Normal respiratory effort, normal lung sounds, no wheezing  Neurological: Alert and orient x 3  Skin: Warm, dry, no ecchymosis, no rash  Psych: Appropriate mood and affect. Normal judgment and insight         Lab Results   Component Value Date     06/01/2022     12/14/2021    K 4.3 06/01/2022    K 4.8 12/14/2021     06/01/2022     12/14/2021    CO2 20 06/01/2022    CO2 23 12/14/2021    BUN 23 06/01/2022    BUN 19 12/14/2021    CREATININE 1.32 (H) 06/01/2022    CREATININE 1.32 (H) 12/14/2021    EGFRIFNONA 53 (L) 12/14/2021    EGFRIFNONA 52 (L) 08/31/2021    EGFRIFAFRI 61 12/14/2021    EGFRIFAFRI 63 08/31/2021    GLUCOSE 129 (H) 06/01/2022    GLUCOSE 146 (H) 12/14/2021    CALCIUM 9.6 06/01/2022    CALCIUM 9.4 12/14/2021    PROTENTOTREF 6.4 06/01/2022    PROTENTOTREF 6.6 12/14/2021    ALBUMIN 4.0 06/01/2022    ALBUMIN 4.1 12/14/2021    BILITOT 0.8 06/01/2022    BILITOT 0.7 12/14/2021    AST 25 06/01/2022    AST 22 12/14/2021    ALT 35 06/01/2022    ALT 30 12/14/2021     Lab Results   Component Value Date    WBC 5.5 06/01/2022    WBC 5.21 08/31/2021    HGB 15.2 06/01/2022    HGB 15.8 08/31/2021    HCT 44.9 06/01/2022    HCT 45.0 08/31/2021    MCV 95 06/01/2022    MCV 93.4 08/31/2021     06/01/2022     08/31/2021     Lab Results   Component Value Date    CHOL 114 05/14/2020    TRIG 217 (H) 06/01/2022    TRIG 155 (H) 12/14/2021    HDL 28 (L) 06/01/2022    HDL 33 (L) 12/14/2021    LDL 58 06/01/2022    LDL 58 12/14/2021     No results " found for: PROBNP, BNP  Lab Results   Component Value Date    TROPONINT <0.010 07/17/2018     Lab Results   Component Value Date    TSH 2.200 06/01/2022    TSH 2.000 05/24/2021           ECG 12 Lead    Date/Time: 8/2/2022 9:18 AM  Performed by: Yoselin Goodrich APRN  Authorized by: Yoselin Goodrich APRN   Comparison: compared with previous ECG from 7/12/2021  Similar to previous ECG  Rhythm: sinus rhythm  Rate: normal  ST Segments: ST segments normal  T Waves: T waves normal  QRS axis: normal                 Assessment:          Diagnosis Plan   1. Primary hypertension  ECG 12 Lead   2. Mixed hyperlipidemia     3. Paroxysmal SVT (supraventricular tachycardia) (HCC)            Plan:       Hypertension - BP is controlled on current medications, 130/70 mmHg today.  I am not making any medication changes    Hyperlipidemia - 6/2022 lipid panel reviewed with good control.  Continue atorvastatin.  AST and ALT okay on lab work this year    Paroxysmal SVT - denies palpitations.  Continue carvedilol    Patient is seen today for follow-up.  He is doing well from a cardiovascular perspective.  I am not making any changes in his medications.  We discussed lifestyle modifications for weight loss.  He will see Dr. Zaidi again in a year, earlier with problems.    Orders Placed This Encounter   Procedures   • ECG 12 Lead     This order was created via procedure documentation     Order Specific Question:   Release to patient     Answer:   Immediate            GÉNESIS Carreon  [facility]  08/02/22  09:33 EDT

## 2022-08-15 RX ORDER — EPINEPHRINE 0.3 MG/.3ML
0.3 INJECTION SUBCUTANEOUS ONCE AS NEEDED
Qty: 1 EACH | Refills: 5 | Status: SHIPPED | OUTPATIENT
Start: 2022-08-15

## 2022-09-09 RX ORDER — CARVEDILOL 6.25 MG/1
TABLET ORAL
Qty: 180 TABLET | Refills: 3 | Status: SHIPPED | OUTPATIENT
Start: 2022-09-09

## 2022-09-09 NOTE — TELEPHONE ENCOUNTER
Please advise filling Carvedilol    LOV   -   8/2/2022 CPS  Next   -   8/7/2023  CS  Last labs   -   6/1/2022    Freda ALLEN     full weight-bearing

## 2022-10-11 ENCOUNTER — TELEPHONE (OUTPATIENT)
Dept: INTERNAL MEDICINE | Facility: CLINIC | Age: 72
End: 2022-10-11

## 2022-10-11 ENCOUNTER — TELEMEDICINE (OUTPATIENT)
Dept: INTERNAL MEDICINE | Facility: CLINIC | Age: 72
End: 2022-10-11

## 2022-10-11 DIAGNOSIS — U07.1 COVID-19 VIRUS DETECTED: Primary | ICD-10-CM

## 2022-10-11 PROCEDURE — 99213 OFFICE O/P EST LOW 20 MIN: CPT | Performed by: INTERNAL MEDICINE

## 2022-10-11 RX ORDER — CYCLOBENZAPRINE HCL 10 MG
10 TABLET ORAL 3 TIMES DAILY PRN
Qty: 30 TABLET | Refills: 0 | Status: SHIPPED | OUTPATIENT
Start: 2022-10-11

## 2022-10-11 NOTE — PROGRESS NOTES
Subjective     Sachin Almanzar is a 72 y.o. male who presents with   Chief Complaint   Patient presents with   • Covid-19 Home Monitoring Video Visit       History of Present Illness     You have chosen to receive care through a telehealth visit.  Do you consent to use a video/audio connection for your medical care today? Yes  Provider is located in [place].  The patient is located in [place].      Symptoms started on Sunday.  Cough.  No SOA.  Low grade fever.  HA and body aches associates.      Review of Systems   Constitutional: Positive for fever.   Respiratory: Negative for chest tightness, shortness of breath and wheezing.    Cardiovascular: Negative for chest pain.   Musculoskeletal: Positive for arthralgias and back pain.   Neurological: Positive for headaches.       The following portions of the patient's history were reviewed and updated as appropriate: allergies, current medications and problem list.    Patient Active Problem List    Diagnosis Date Noted   • Diabetes mellitus type II, controlled, with no complications ([facility]) 08/23/2021   • Stage 3 chronic kidney disease ([facility]) 01/10/2019   • Multiple renal cysts 05/07/2018   • Allergy to shellfish 04/19/2017   • Venous insufficiency of both lower extremities 04/21/2016   • Paroxysmal SVT (supraventricular tachycardia) ([facility]) 03/24/2014     Note Last Updated: 7/27/2018     Overview:   200 heart rate, after caffeine (no longer uses caffeine)    Overview:   200 heart rate, after caffeine (no longer uses caffeine)     • Erectile dysfunction 02/25/2013   • Gastroesophageal reflux disease 02/25/2013   • Allergic rhinitis 07/10/2012   • Hyperlipidemia 07/10/2012   • Hypertension 07/10/2012   • Nephrolithiasis 07/10/2012     Note Last Updated: 7/27/2018     Overview:   Recurrent, Fall of 2016 (Dr. Muniz)  Had shockwave lithotripsy    Overview:   Recurrent, Fall of 2016 (Dr. Muniz)  Had shockwave lithotripsy     • Obstructive sleep apnea on CPAP 07/10/2012   •  History of bladder cancer 07/10/2012     Note Last Updated: 8/26/2019     Overview:   3/2011, Dr. Muniz, TURBT, BCG and infusion of chemo         Current Outpatient Medications on File Prior to Visit   Medication Sig Dispense Refill   • atorvastatin (LIPITOR) 20 MG tablet Take 1 tablet by mouth Every Night. 90 tablet 3   • carvedilol (COREG) 6.25 MG tablet TAKE 1 TABLET BY MOUTH EVERY 12 HOURS 180 tablet 3   • EPINEPHrine (EPIPEN) 0.3 MG/0.3ML solution auto-injector injection Inject 0.3 mL into the appropriate muscle as directed by prescriber 1 (One) Time As Needed (anaphylasis) for up to 6 doses. 1 each 5   • famotidine (PEPCID) 20 MG tablet Take 20 mg by mouth Daily.     • fexofenadine (ALLEGRA) 180 MG tablet Take 180 mg by mouth Daily.     • Lancets (OneTouch Delica Plus [id number]) misc Daily. for testing     • melatonin 1 MG tablet Take 1 mg by mouth At Night As Needed for Sleep.     • NIFEdipine XL (PROCARDIA XL) 30 MG 24 hr tablet Take 1 tablet by mouth Daily. 90 tablet 3   • OneTouch Verio test strip USE ONCE DAILY FOR TYPE 2 DIABETES. DX: E11.9 100 each 5   • sildenafil (VIAGRA) 100 MG tablet Take 100 mg by mouth As Needed for erectile dysfunction.     • [DISCONTINUED] cyclobenzaprine (FLEXERIL) 10 MG tablet Take 1 tablet by mouth 3 (Three) Times a Day As Needed for Muscle Spasms. 30 tablet 0     No current facility-administered medications on file prior to visit.       Objective     There were no vitals taken for this visit.    Physical Exam  Constitutional:       Appearance: He is well-developed.   HENT:      Head: Atraumatic.   Pulmonary:      Effort: Pulmonary effort is normal.   Neurological:      Mental Status: He is alert and oriented to person, place, and time.         Assessment & Plan   Diagnoses and all orders for this visit:    1. COVID-19 virus detected (Primary)    Other orders  -     Nirmatrelvir&Ritonavir 300/100 (PAXLOVID) 20 x 150 MG & 10 x 100MG tablet therapy pack tablet; Take 2 tablets  by mouth 2 (Two) Times a Day for 5 days.  Dispense: 20 tablet; Refill: 0  -     cyclobenzaprine (FLEXERIL) 10 MG tablet; Take 1 tablet by mouth 3 (Three) Times a Day As Needed for Muscle Spasms.  Dispense: 30 tablet; Refill: 0        Discussion    I recommend attention to rest and fluids. I recommend as needed tylenol for fevers and aches. I recommend monitoring pulse oximetry if you have access to that. Paxlovid is an FDA approved for emergency use.  Important considerations include numerous drug interactions.  Symptoms could rebound in some after a course of this medication according to recent reports.    Hold atorvastatin and nifedipine while on.   Reasons to go to the ER include severe trouble breathing, chest pain, confusion, inability to wake or stay awake, and bluish lips or face.  Continue supportive measures and let me know if there is any change in symptoms.         Future Appointments   Date Time Provider Department Center   12/2/2022  9:20 AM [facility]   12/9/2022  9:15 AM Yvonne Chacko MD [facility]   8/7/2023  9:40 AM Shorty Zaidi MD [facility]

## 2022-10-11 NOTE — TELEPHONE ENCOUNTER
Caller: Julita Almanzar    Relationship to patient: Emergency Contact    Best call back number: [phone]    Date of exposure: UNKNOWN    Date of positive COVID19 test: 10.11.22 AT HOME TEST     COVID19 symptoms: LOW GRADE FEVER, COUGHING, HEAD AND CHEST CONGESTION, BODY ACHES, FATIGUE    Date of initial quarantine: 10.09.22    Additional information or concerns: PATIENTS WIFE WOULD LIKE TO KNOW IF PATIENT CAN BE PRESCRIBED THE PAXLOVID.     PATIENTS WIFE STATED THAT PATIENT WAS TESTED AT [facility] THROUGH TESTING AS WELL.     What is the patients preferred [id number] [place], [place] - [address] AT [facility] [address] - [phone]  - [phone] FX

## 2022-12-02 DIAGNOSIS — E11.9 CONTROLLED TYPE 2 DIABETES MELLITUS WITHOUT COMPLICATION, WITHOUT LONG-TERM CURRENT USE OF INSULIN: ICD-10-CM

## 2022-12-02 DIAGNOSIS — E78.2 MIXED HYPERLIPIDEMIA: Primary | ICD-10-CM

## 2022-12-02 DIAGNOSIS — I10 PRIMARY HYPERTENSION: ICD-10-CM

## 2022-12-03 LAB
ALBUMIN SERPL-MCNC: 4 G/DL (ref 3.5–5.2)
ALBUMIN/GLOB SERPL: 1.9 G/DL
ALP SERPL-CCNC: 55 U/L (ref 39–117)
ALT SERPL-CCNC: 32 U/L (ref 1–41)
AST SERPL-CCNC: 19 U/L (ref 1–40)
BASOPHILS # BLD AUTO: 0.05 10*3/MM3 (ref 0–0.2)
BASOPHILS NFR BLD AUTO: 0.7 % (ref 0–1.5)
BILIRUB SERPL-MCNC: 1 MG/DL (ref 0–1.2)
BUN SERPL-MCNC: 23 MG/DL (ref 8–23)
BUN/CREAT SERPL: 19.8 (ref 7–25)
CALCIUM SERPL-MCNC: 9.1 MG/DL (ref 8.6–10.5)
CHLORIDE SERPL-SCNC: 105 MMOL/L (ref 98–107)
CO2 SERPL-SCNC: 26 MMOL/L (ref 22–29)
CREAT SERPL-MCNC: 1.16 MG/DL (ref 0.76–1.27)
EGFRCR SERPLBLD CKD-EPI 2021: 66.1 ML/MIN/1.73
EOSINOPHIL # BLD AUTO: 0.11 10*3/MM3 (ref 0–0.4)
EOSINOPHIL NFR BLD AUTO: 1.5 % (ref 0.3–6.2)
ERYTHROCYTE [DISTWIDTH] IN BLOOD BY AUTOMATED COUNT: 13.3 % (ref 12.3–15.4)
GLOBULIN SER CALC-MCNC: 2.1 GM/DL
GLUCOSE SERPL-MCNC: 124 MG/DL (ref 65–99)
HBA1C MFR BLD: 6.1 % (ref 4.8–5.6)
HCT VFR BLD AUTO: 44 % (ref 37.5–51)
HGB BLD-MCNC: 15.3 G/DL (ref 13–17.7)
IMM GRANULOCYTES # BLD AUTO: 0.05 10*3/MM3 (ref 0–0.05)
IMM GRANULOCYTES NFR BLD AUTO: 0.7 % (ref 0–0.5)
LYMPHOCYTES # BLD AUTO: 0.86 10*3/MM3 (ref 0.7–3.1)
LYMPHOCYTES NFR BLD AUTO: 12 % (ref 19.6–45.3)
MCH RBC QN AUTO: 32.2 PG (ref 26.6–33)
MCHC RBC AUTO-ENTMCNC: 34.8 G/DL (ref 31.5–35.7)
MCV RBC AUTO: 92.6 FL (ref 79–97)
MONOCYTES # BLD AUTO: 0.76 10*3/MM3 (ref 0.1–0.9)
MONOCYTES NFR BLD AUTO: 10.6 % (ref 5–12)
NEUTROPHILS # BLD AUTO: 5.32 10*3/MM3 (ref 1.7–7)
NEUTROPHILS NFR BLD AUTO: 74.5 % (ref 42.7–76)
NRBC BLD AUTO-RTO: 0 /100 WBC (ref 0–0.2)
PLATELET # BLD AUTO: 198 10*3/MM3 (ref 140–450)
POTASSIUM SERPL-SCNC: 4.5 MMOL/L (ref 3.5–5.2)
PROT SERPL-MCNC: 6.1 G/DL (ref 6–8.5)
RBC # BLD AUTO: 4.75 10*6/MM3 (ref 4.14–5.8)
SODIUM SERPL-SCNC: 139 MMOL/L (ref 136–145)
WBC # BLD AUTO: 7.15 10*3/MM3 (ref 3.4–10.8)

## 2022-12-09 ENCOUNTER — OFFICE VISIT (OUTPATIENT)
Dept: INTERNAL MEDICINE | Facility: CLINIC | Age: 72
End: 2022-12-09

## 2022-12-09 VITALS
WEIGHT: 246 LBS | HEART RATE: 81 BPM | OXYGEN SATURATION: 98 % | HEIGHT: 70 IN | BODY MASS INDEX: 35.22 KG/M2 | SYSTOLIC BLOOD PRESSURE: 120 MMHG | DIASTOLIC BLOOD PRESSURE: 76 MMHG

## 2022-12-09 DIAGNOSIS — E11.9 CONTROLLED TYPE 2 DIABETES MELLITUS WITHOUT COMPLICATION, WITHOUT LONG-TERM CURRENT USE OF INSULIN: Primary | ICD-10-CM

## 2022-12-09 DIAGNOSIS — I10 PRIMARY HYPERTENSION: ICD-10-CM

## 2022-12-09 PROCEDURE — 99214 OFFICE O/P EST MOD 30 MIN: CPT | Performed by: INTERNAL MEDICINE

## 2022-12-09 RX ORDER — DOXYCYCLINE 100 MG/1
CAPSULE ORAL
COMMUNITY
Start: 2022-11-17

## 2022-12-09 NOTE — PROGRESS NOTES
Subjective     Sachin Almanzar is a 72 y.o. male who presents with   Chief Complaint   Patient presents with   • Hypertension   • Hypothyroidism   • Diabetes       History of Present Illness     The following data was reviewed by: Yvonne Chacko MD on 12/09/2022:  Common labs    Common Labs 6/1/22 6/1/22 6/1/22 6/1/22 6/1/22 12/2/22 12/2/22 12/2/22    0810 0810 0810 0810 0810 0913 0913 0913   Glucose  129 (A)     124 (A)    BUN  23     23    Creatinine  1.32 (A)     1.16    Sodium  139     139    Potassium  4.3     4.5    Chloride  104     105    Calcium  9.6     9.1    Total Protein  6.4     6.1    Albumin  4.0     4.00    Total Bilirubin  0.8     1.0    Alkaline Phosphatase  64     55    AST (SGOT)  25     19    ALT (SGPT)  35     32    WBC 5.5     7.15     Hemoglobin 15.2     15.3     Hematocrit 44.9     44.0     Platelets 201     198     Total Cholesterol   121        Triglycerides   217 (A)        HDL Cholesterol   28 (A)        LDL Cholesterol    58        Hemoglobin A1C    6.1 (A)    6.10 (A)   PSA     0.5      (A) Abnormal value       Comments are available for some flowsheets but are not being displayed.           DM-2.  Good control of A1c.  HTN.  Good BP control in office.        Review of Systems   Respiratory: Negative.    Cardiovascular: Negative.        The following portions of the patient's history were reviewed and updated as appropriate: allergies, current medications and problem list.    Patient Active Problem List    Diagnosis Date Noted   • Diabetes mellitus type II, controlled, with no complications ([facility]) 08/23/2021   • Stage 3 chronic kidney disease ([facility]) 01/10/2019   • Multiple renal cysts 05/07/2018   • Allergy to shellfish 04/19/2017   • Venous insufficiency of both lower extremities 04/21/2016   • Paroxysmal SVT (supraventricular tachycardia) ([facility]) 03/24/2014     Note Last Updated: 7/27/2018     Overview:   200 heart rate, after caffeine (no longer uses caffeine)    Overview:   200  heart rate, after caffeine (no longer uses caffeine)     • Erectile dysfunction 02/25/2013   • Gastroesophageal reflux disease 02/25/2013   • Allergic rhinitis 07/10/2012   • Hyperlipidemia 07/10/2012   • Hypertension 07/10/2012   • Nephrolithiasis 07/10/2012     Note Last Updated: 7/27/2018     Overview:   Recurrent, Fall of 2016 (Dr. Muniz)  Had shockwave lithotripsy    Overview:   Recurrent, Fall of 2016 (Dr. Muniz)  Had shockwave lithotripsy     • Obstructive sleep apnea on CPAP 07/10/2012   • History of bladder cancer 07/10/2012     Note Last Updated: 8/26/2019     Overview:   3/2011, Dr. Muniz, TURBT, BCG and infusion of chemo         Current Outpatient Medications on File Prior to Visit   Medication Sig Dispense Refill   • atorvastatin (LIPITOR) 20 MG tablet Take 1 tablet by mouth Every Night. 90 tablet 3   • carvedilol (COREG) 6.25 MG tablet TAKE 1 TABLET BY MOUTH EVERY 12 HOURS 180 tablet 3   • cyclobenzaprine (FLEXERIL) 10 MG tablet Take 1 tablet by mouth 3 (Three) Times a Day As Needed for Muscle Spasms. 30 tablet 0   • doxycycline (MONODOX) 100 MG capsule TAKE 1 CAPSULE BY MOUTH EVERY DAY FOR ROSACEA     • EPINEPHrine (EPIPEN) 0.3 MG/0.3ML solution auto-injector injection Inject 0.3 mL into the appropriate muscle as directed by prescriber 1 (One) Time As Needed (anaphylasis) for up to 6 doses. 1 each 5   • famotidine (PEPCID) 20 MG tablet Take 20 mg by mouth Daily.     • fexofenadine (ALLEGRA) 180 MG tablet Take 180 mg by mouth Daily.     • Lancets (OneTouch Delica Plus [id number]) misc Daily. for testing     • melatonin 1 MG tablet Take 1 mg by mouth At Night As Needed for Sleep.     • NIFEdipine XL (PROCARDIA XL) 30 MG 24 hr tablet Take 1 tablet by mouth Daily. 90 tablet 3   • OneTouch Verio test strip USE ONCE DAILY FOR TYPE 2 DIABETES. DX: E11.9 100 each 5   • sildenafil (VIAGRA) 100 MG tablet Take 100 mg by mouth As Needed for erectile dysfunction.       No current facility-administered  "medications on file prior to visit.       Objective     /76   Pulse 81   Ht 177.8 cm (70\")   Wt 112 kg (246 lb)   SpO2 98%   BMI 35.30 kg/m²     Physical Exam  Constitutional:       Appearance: He is well-developed.   HENT:      Head: Atraumatic.   Cardiovascular:      Rate and Rhythm: Normal rate and regular rhythm.      Heart sounds: Normal heart sounds.   Pulmonary:      Effort: Pulmonary effort is normal.      Breath sounds: Normal breath sounds.   Skin:     General: Skin is warm and dry.   Neurological:      Mental Status: He is alert and oriented to person, place, and time.         Assessment & Plan   Diagnoses and all orders for this visit:    1. Controlled type 2 diabetes mellitus without complication, without long-term current use of insulin (HCC) (Primary)    2. Primary hypertension        Discussion    DM-2. I recommend a diet high in fruits, vegetables, whole grains, lean meats, nuts and beans.  I recommend limiting red meat, full fat dairy, eggs and processed white carbohydrates.  I recommend aerobic exercise at least 3 days per week.   HTN.  Control is good.  The patient is advised to continue current dosage of coreg and nifedical.           Future Appointments   Date Time Provider Department Center   8/7/2023  9:40 AM Shorty Zaidi MD [facility]         "

## 2023-01-16 RX ORDER — NIFEDIPINE 30 MG/1
30 TABLET, EXTENDED RELEASE ORAL DAILY
Qty: 90 TABLET | Refills: 3 | Status: SHIPPED | OUTPATIENT
Start: 2023-01-16

## 2023-03-29 RX ORDER — ATORVASTATIN CALCIUM 20 MG/1
20 TABLET, FILM COATED ORAL NIGHTLY
Qty: 90 TABLET | Refills: 3 | Status: SHIPPED | OUTPATIENT
Start: 2023-03-29

## 2023-07-14 ENCOUNTER — TELEPHONE (OUTPATIENT)
Dept: CARDIOLOGY | Facility: CLINIC | Age: 73
End: 2023-07-14
Payer: MEDICARE

## 2023-07-14 NOTE — TELEPHONE ENCOUNTER
"  Caller: Sachin Almanzar \"Charles\"    Relationship: Self    Best call back number: [phone]    What is the best time to reach you: ANYTIME    Who are you requesting to speak with (clinical staff, provider,  specific staff member): ANYONE    What was the call regarding: PT IS RETURNING A CALL TO RESCHEDULE AN APPT. I COULDN'T FIND ANY AVAILABLITY. PT WOULD LIKE A CALL BACK    Is it okay if the provider responds through MyChart: NO      "

## 2023-07-17 NOTE — TELEPHONE ENCOUNTER
Pt called back returning your call about rescheduling his 8/7/23 appt.    He can be reached at P#[phone].    Thanks,  Lora

## 2023-08-14 ENCOUNTER — PREP FOR SURGERY (OUTPATIENT)
Dept: OTHER | Facility: HOSPITAL | Age: 73
End: 2023-08-14
Payer: MEDICARE

## 2023-08-14 DIAGNOSIS — Z86.010 HISTORY OF ADENOMATOUS POLYP OF COLON: Primary | ICD-10-CM

## 2023-08-15 PROBLEM — Z86.010 HISTORY OF ADENOMATOUS POLYP OF COLON: Status: ACTIVE | Noted: 2023-08-15

## 2023-08-15 PROBLEM — Z86.0101 HISTORY OF ADENOMATOUS POLYP OF COLON: Status: ACTIVE | Noted: 2023-08-15

## 2023-09-25 RX ORDER — DOXYCYCLINE HYCLATE 100 MG/1
100 CAPSULE ORAL DAILY PRN
COMMUNITY

## 2023-09-26 ENCOUNTER — ANESTHESIA EVENT (OUTPATIENT)
Dept: GASTROENTEROLOGY | Facility: HOSPITAL | Age: 73
End: 2023-09-26
Payer: MEDICARE

## 2023-09-26 ENCOUNTER — ANESTHESIA (OUTPATIENT)
Dept: GASTROENTEROLOGY | Facility: HOSPITAL | Age: 73
End: 2023-09-26
Payer: MEDICARE

## 2023-09-26 ENCOUNTER — HOSPITAL ENCOUNTER (OUTPATIENT)
Facility: HOSPITAL | Age: 73
Setting detail: HOSPITAL OUTPATIENT SURGERY
Discharge: HOME | End: 2023-09-26
Attending: SURGERY | Admitting: SURGERY
Payer: MEDICARE

## 2023-09-26 VITALS
SYSTOLIC BLOOD PRESSURE: 126 MMHG | WEIGHT: 247 LBS | HEART RATE: 71 BPM | RESPIRATION RATE: 16 BRPM | TEMPERATURE: 97.8 F | DIASTOLIC BLOOD PRESSURE: 82 MMHG | OXYGEN SATURATION: 99 % | BODY MASS INDEX: 35.36 KG/M2 | HEIGHT: 70 IN

## 2023-09-26 DIAGNOSIS — Z86.010 HISTORY OF ADENOMATOUS POLYP OF COLON: ICD-10-CM

## 2023-09-26 PROCEDURE — S0260 H&P FOR SURGERY: HCPCS | Performed by: SURGERY

## 2023-09-26 PROCEDURE — 25010000002 PROPOFOL 10 MG/ML EMULSION: Performed by: REGISTERED NURSE

## 2023-09-26 PROCEDURE — 88305 TISSUE EXAM BY PATHOLOGIST: CPT | Performed by: SURGERY

## 2023-09-26 PROCEDURE — 45380 COLONOSCOPY AND BIOPSY: CPT | Performed by: SURGERY

## 2023-09-26 PROCEDURE — 45385 COLONOSCOPY W/LESION REMOVAL: CPT | Performed by: SURGERY

## 2023-09-26 RX ORDER — LIDOCAINE HYDROCHLORIDE 20 MG/ML
INJECTION, SOLUTION INFILTRATION; PERINEURAL AS NEEDED
Status: DISCONTINUED | OUTPATIENT
Start: 2023-09-26 | End: 2023-09-26 | Stop reason: SURG

## 2023-09-26 RX ORDER — SODIUM CHLORIDE, SODIUM LACTATE, POTASSIUM CHLORIDE, CALCIUM CHLORIDE 600; 310; 30; 20 MG/100ML; MG/100ML; MG/100ML; MG/100ML
INJECTION, SOLUTION INTRAVENOUS CONTINUOUS PRN
Status: DISCONTINUED | OUTPATIENT
Start: 2023-09-26 | End: 2023-09-26 | Stop reason: SURG

## 2023-09-26 RX ORDER — PROPOFOL 10 MG/ML
VIAL (ML) INTRAVENOUS CONTINUOUS PRN
Status: DISCONTINUED | OUTPATIENT
Start: 2023-09-26 | End: 2023-09-26 | Stop reason: SURG

## 2023-09-26 RX ORDER — SODIUM CHLORIDE 9 MG/ML
30 INJECTION, SOLUTION INTRAVENOUS CONTINUOUS PRN
Status: DISCONTINUED | OUTPATIENT
Start: 2023-09-26 | End: 2023-09-26 | Stop reason: HOSPADM

## 2023-09-26 RX ADMIN — SODIUM CHLORIDE 30 ML/HR: 9 INJECTION, SOLUTION INTRAVENOUS at 12:31

## 2023-09-26 RX ADMIN — SODIUM CHLORIDE, POTASSIUM CHLORIDE, SODIUM LACTATE AND CALCIUM CHLORIDE: 600; 310; 30; 20 INJECTION, SOLUTION INTRAVENOUS at 13:06

## 2023-09-26 RX ADMIN — PROPOFOL 200 MCG/KG/MIN: 10 INJECTION, EMULSION INTRAVENOUS at 13:07

## 2023-09-26 RX ADMIN — LIDOCAINE HYDROCHLORIDE 60 MG: 20 INJECTION, SOLUTION INFILTRATION; PERINEURAL at 13:07

## 2023-09-26 NOTE — OP NOTE
Operative Note :   Domingo Cantrell MD    Sachin HAYS Jenelle  1950    Procedure Date: 09/26/23    Pre-op Diagnosis:  Personal history of colon polyps    Post-op Diagnosis:  Colon polyps    Procedure:   Colonoscopy to cecum with hot snare and cold forceps polypectomy    Surgeon: Domingo Cantrell MD      Anesthesia: MAC    Indications:  73-year-old gentleman presenting for colonoscopy.  Last scope done 2017 with finding of adenomatous polyp.  No other known problems in the interim.  No other high risk factors.    Findings:   Benign-appearing polyps involving the ascending colon and hepatic flexure    Recommendations:   Follow-up polyp pathology    Description of procedure:    After obtaining informed consent, patient was brought to the endoscopy suite and was sedated.  Digital rectal exam was undertaken and was unremarkable.  The flexible colonoscope was then introduced through the rectum and passed around to the level of the cecum under direct visualization.  The ileocecal valve and appendiceal orifice were normal.  Scope was then slowly withdrawn, carefully visualizing all mucosal surfaces.  The ileocecal valve and appendiceal orifice were unremarkable.  In the ascending colon there was a benign-appearing polyp removed completely with the hot snare device and then suctioned through the scope.  Adjacent to it was another smaller polyp which was removed with a single bite of the polypectomy forceps.  The ascending colon demonstrated another small polyp in the area of the hepatic flexure or distal ascending colon and this was also removed with the hot snare device and suctioned through the scope.  The site was hemostatic.  The remainder of the transverse colon, splenic flexure, descending colon, sigmoid colon and rectum were unremarkable.  The scope was withdrawn completely.  The patient tolerated this well.    Domingo Cantrell MD  General and Endoscopic Surgery  [facility]    [address]  [place], [place], [address]  P: [phone]  F: [phone]

## 2023-09-26 NOTE — ANESTHESIA POSTPROCEDURE EVALUATION
"Patient: Sachin Almanzar    Procedure Summary       Date: 09/26/23 Room / Location: [facility] ENDOSCOPY 5 / [facility] ENDOSCOPY    Anesthesia Start: 1304 Anesthesia Stop: 1341    Procedure: COLONOSCOPY TO CECUM WITH HOT SNARE POLYPECTOMIES AND COLD POLYPECTOMY Diagnosis:       History of adenomatous polyp of colon      (History of adenomatous polyp of colon [Z86.010])    Surgeons: Domingo Cantrell MD Provider: Mike Swan MD    Anesthesia Type: MAC ASA Status: 3            Anesthesia Type: MAC    Vitals  Vitals Value Taken Time   /82 09/26/23 1359   Temp     Pulse 71 09/26/23 1359   Resp 16 09/26/23 1359   SpO2 99 % 09/26/23 1359           Post Anesthesia Care and Evaluation    Patient location during evaluation: bedside  Patient participation: complete - patient participated  Level of consciousness: awake and alert  Pain management: adequate    Airway patency: patent  Anesthetic complications: No anesthetic complications    Cardiovascular status: acceptable  Respiratory status: acceptable  Hydration status: acceptable    Comments: /82   Pulse 71   Temp 36.6 °C (97.8 °F) (Oral)   Resp 16   Ht 177.8 cm (70\")   Wt 112 kg (247 lb)   SpO2 99%   BMI 35.44 kg/m²     "

## 2023-09-26 NOTE — H&P
Cc: Personal history of colon polyps    History of presenting illness: This is a nice 73-year-old gentleman presenting for colonoscopy.  Last scope done approximately 6 years ago with finding of adenomatous polyp.  No other known troubles in the interim.    Past medical history: Chronic kidney disease, peptic ulcer disease, hyperlipidemia, hypertension, bladder cancer, melanoma in situ, sleep apnea, supraventricular tachycardia    Past surgical history: Remote appendectomy in the 1950s, colonoscopy most recently done 2017 by me, Mohs surgery, lithotripsy, bladder tumor excision    Medications: Lipitor, carvedilol, Flexeril, Pepcid, Allegra, melatonin, nifedipine    Allergies: ACE inhibitors, shellfish    Social history: He is a non-smoker    Family history: Negative for known colon or rectal cancer    Physical exam:  Body mass index: 35.4  General: Awake and alert, no distress  Head: Normocephalic, atraumatic  Neck: Supple, trachea midline  Eyes: Extraocular movements intact, no icterus  Respiratory: No use of accessory muscles, good bilateral chest expansion  Abdomen: Soft, benign, no hernia felt  Skin: Warm and dry    Assessment and plan:  -Personal history of colon polyps  -Plan for colonoscopy today, risks include bleeding and perforation, patient agreeable to proceed    Domingo Cantrell MD  General and Endoscopic Surgery  [facility]    [address], [address]  [place], [place], [address]  P: [phone]  F: [phone]

## 2023-09-26 NOTE — ANESTHESIA PREPROCEDURE EVALUATION
Anesthesia Evaluation     Patient summary reviewed and Nursing notes reviewed   no history of anesthetic complications:   NPO Solid Status: > 8 hours  NPO Liquid Status: > 8 hours           Airway   Mallampati: III  Dental      Pulmonary - normal exam   (+) ,sleep apnea  Cardiovascular - normal exam    (+) hypertension well controlled, PVD, hyperlipidemia      Neuro/Psych  GI/Hepatic/Renal/Endo    (+) obesity, GERD    Musculoskeletal     Abdominal    Substance History      OB/GYN          Other                        Anesthesia Plan    ASA 3     MAC     intravenous induction     Anesthetic plan, risks, benefits, and alternatives have been provided, discussed and informed consent has been obtained with: patient.

## 2023-09-26 NOTE — DISCHARGE INSTRUCTIONS
For the next 24 hours patient needs to be with a responsible adult.    For 24 hours DO NOT drive, operate machinery, appliances, drink alcohol, make important decisions or sign legal documents.    Start with a light or bland diet if you are feeling sick to your stomach otherwise advance to regular diet as tolerated.    Follow recommendations on procedure report if provided by your doctor.    Call Dr Cantrell for problems [phone]    Problems may include but not limited to: large amounts of bleeding, trouble breathing, repeated vomiting, severe unrelieved pain, fever or chills.

## 2023-09-27 LAB
LAB AP CASE REPORT: NORMAL
PATH REPORT.FINAL DX SPEC: NORMAL
PATH REPORT.GROSS SPEC: NORMAL

## 2023-09-27 NOTE — PROGRESS NOTES
Please notify patient polyp was benign, could consider repeat scope in 5 years if health remains good.

## 2023-10-05 ENCOUNTER — TELEPHONE (OUTPATIENT)
Dept: SURGERY | Facility: CLINIC | Age: 73
End: 2023-10-05
Payer: MEDICARE

## 2023-10-05 NOTE — TELEPHONE ENCOUNTER
----- Message from Domingo Cantrell MD sent at 9/27/2023  3:16 PM EDT -----  Please notify patient polyp was benign, could consider repeat scope in 5 years if health remains good.

## 2023-10-10 RX ORDER — CARVEDILOL 6.25 MG/1
TABLET ORAL
Qty: 180 TABLET | Refills: 3 | Status: SHIPPED | OUTPATIENT
Start: 2023-10-10

## 2023-12-26 RX ORDER — NIFEDIPINE 30 MG/1
30 TABLET, EXTENDED RELEASE ORAL DAILY
Qty: 90 TABLET | Refills: 0 | Status: SHIPPED | OUTPATIENT
Start: 2023-12-26

## 2023-12-26 NOTE — TELEPHONE ENCOUNTER
NOV-01/18/24-CS  LOV-08/02/22-CPS    Plan:      Plan   Hypertension - BP is controlled on current medications, 130/70 mmHg today.  I am not making any medication changes     Hyperlipidemia - 6/2022 lipid panel reviewed with good control.  Continue atorvastatin.  AST and ALT okay on lab work this year     Paroxysmal SVT - denies palpitations.  Continue carvedilol     Patient is seen today for follow-up.  He is doing well from a cardiovascular perspective.  I am not making any changes in his medications.  We discussed lifestyle modifications for weight loss.  He will see Dr. Zaidi again in a year, earlier with problems.

## 2023-12-29 DIAGNOSIS — E78.2 MIXED HYPERLIPIDEMIA: Primary | ICD-10-CM

## 2023-12-29 DIAGNOSIS — I10 PRIMARY HYPERTENSION: ICD-10-CM

## 2023-12-29 DIAGNOSIS — E11.9 CONTROLLED TYPE 2 DIABETES MELLITUS WITHOUT COMPLICATION, WITHOUT LONG-TERM CURRENT USE OF INSULIN: ICD-10-CM

## 2024-01-18 ENCOUNTER — OFFICE VISIT (OUTPATIENT)
Age: 74
End: 2024-01-18
Payer: MEDICARE

## 2024-01-18 VITALS
BODY MASS INDEX: 35.07 KG/M2 | WEIGHT: 245 LBS | HEIGHT: 70 IN | HEART RATE: 84 BPM | DIASTOLIC BLOOD PRESSURE: 82 MMHG | SYSTOLIC BLOOD PRESSURE: 138 MMHG

## 2024-01-18 DIAGNOSIS — I10 PRIMARY HYPERTENSION: Primary | ICD-10-CM

## 2024-01-18 DIAGNOSIS — E78.2 MIXED HYPERLIPIDEMIA: ICD-10-CM

## 2024-01-18 DIAGNOSIS — I47.10 PAROXYSMAL SVT (SUPRAVENTRICULAR TACHYCARDIA): ICD-10-CM

## 2024-01-18 PROCEDURE — 3075F SYST BP GE 130 - 139MM HG: CPT | Performed by: INTERNAL MEDICINE

## 2024-01-18 PROCEDURE — 93000 ELECTROCARDIOGRAM COMPLETE: CPT | Performed by: INTERNAL MEDICINE

## 2024-01-18 PROCEDURE — 3079F DIAST BP 80-89 MM HG: CPT | Performed by: INTERNAL MEDICINE

## 2024-01-18 PROCEDURE — 99214 OFFICE O/P EST MOD 30 MIN: CPT | Performed by: INTERNAL MEDICINE

## 2024-01-18 NOTE — PROGRESS NOTES
[facility] Follow Up Office Note     Encounter Date:24  Patient:Sachin Almanzar  :1950  MRN:[id number]      Chief Complaint:   Chief Complaint   Patient presents with    Supraventricular Tachycardia    Hypertension    Follow-up     1 year         History of Presenting Illness:      73-year-old male with a medical history of essential hypertension, hyperlipidemia, diabetes and distant history of SVT.  Patient had extensive evaluation for syncopal episode in 2018.  Echo revealed normal LV function with no significant valvular abnormalities, treadmill stress test with no evidence of ischemia, Holter monitor relatively benign with only rare atrial ectopic beats.    He states has been doing very well as of late.  He denies any chest pain or dyspnea on exertion.  His blood pressure is just very slightly elevated here today but has been reasonly well-controlled.  No recent syncopal episodes    Review of Systems:  Review of Systems   Constitutional: Negative for fever and malaise/fatigue.   HENT:  Negative for nosebleeds and sore throat.    Eyes:  Negative for blurred vision and double vision.   Cardiovascular:  Negative for chest pain, claudication, dyspnea on exertion, leg swelling, orthopnea, palpitations and syncope.   Respiratory:  Negative for cough, shortness of breath and snoring.    Endocrine: Negative for cold intolerance, heat intolerance and polydipsia.   Skin:  Negative for itching, poor wound healing and rash.   Musculoskeletal:  Negative for joint pain, joint swelling, muscle weakness and myalgias.   Gastrointestinal:  Negative for abdominal pain, melena, nausea and vomiting.   Neurological:  Negative for light-headedness, loss of balance, seizures, vertigo and weakness.   Psychiatric/Behavioral:  Negative for altered mental status and depression.        Current Outpatient Medications on File Prior to Visit   Medication Sig Dispense Refill    atorvastatin (LIPITOR) 20 MG  tablet TAKE 1 TABLET BY MOUTH EVERY NIGHT 90 tablet 3    carvedilol (COREG) 6.25 MG tablet TAKE 1 TABLET BY MOUTH EVERY 12 HOURS 180 tablet 3    cyclobenzaprine (FLEXERIL) 10 MG tablet Take 1 tablet by mouth 3 (Three) Times a Day As Needed for Muscle Spasms. 30 tablet 0    doxycycline (VIBRAMYCIN) 100 MG capsule Take 1 capsule by mouth Daily As Needed (FOR SKIN ISSUES).      EPINEPHrine (EPIPEN) 0.3 MG/0.3ML solution auto-injector injection Inject 0.3 mL into the appropriate muscle as directed by prescriber 1 (One) Time As Needed (anaphylasis) for up to 6 doses. 1 each 5    famotidine (PEPCID) 20 MG tablet Take 1 tablet by mouth Daily.      fexofenadine (ALLEGRA) 180 MG tablet Take 1 tablet by mouth Daily.      Lancets (OneTouch Delica Plus [id number]) misc Daily. for testing      melatonin 1 MG tablet Take 3 tablets by mouth At Night As Needed for Sleep.      NIFEdipine XL (PROCARDIA XL) 30 MG 24 hr tablet TAKE 1 TABLET BY MOUTH DAILY 90 tablet 0    OneTouch Verio test strip USE ONCE DAILY FOR TYPE 2 DIABETES. DX: E11.9 100 each 5    sildenafil (VIAGRA) 100 MG tablet Take 1 tablet by mouth As Needed for Erectile Dysfunction.       No current facility-administered medications on file prior to visit.       Allergies   Allergen Reactions    Ace Inhibitors     Shellfish-Derived Products Hives     Throat swelling, itching       Past Medical History:   Diagnosis Date    Anemia     Basal cell carcinoma 07/10/2012    Overview:  NOSE  Overview:  NOSE    Basal cell carcinoma of skin 07/10/2012    Overview:  NOSE    Bladder cancer 07/10/2012    Overview:  3/2011, Dr. Muniz, TURBT, BCG and infusion of chemo  Overview:  3/2011, Dr. 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 Muniz, TURBT, BCG and infusion of chemo    Melanoma in situ of face 06/14/2016    Overview:  Removed per Dr. Steen (derm) on 6/13/2016    Obesity     Pre-diabetes     Renal insufficiency     Sleep apnea with use of continuous positive airway pressure (CPAP)     SVT (supraventricular tachycardia) 2001    ONE TIME EPISODE       Past Surgical History:   Procedure Laterality Date    ADENOIDECTOMY      APPENDECTOMY  1954    BLADDER TUMOR EXCISION      CLAVICLE OPEN REDUCTION INTERNAL FIXATION Right 07/20/2018    Procedure: CLAVICLE OPEN REDUCTION INTERNAL FIXATION;  Surgeon: Cuate Elias MD;  Location: [facility] MAIN OR;  Service: Orthopedics    COLONOSCOPY N/A 12/12/2017    Procedure: COLONOSCOPY INTO CECUM WITH COLD BX POLYPECTOMY ;  Surgeon: Domingo Cantrell MD;  Location: [facility] ENDOSCOPY;  Service:     COLONOSCOPY N/A 9/26/2023    Procedure: COLONOSCOPY TO CECUM WITH HOT SNARE POLYPECTOMIES AND COLD POLYPECTOMY;  Surgeon: Domingo Cantrell MD;  Location: [facility] ENDOSCOPY;  Service: General;  Laterality: N/A;  PRE OP - PERS H/O POLYPS  POST OP - COLON POLYPS    COSMETIC SURGERY      Nose - melanoma removal    EXTRACORPOREAL SHOCKWAVE LITHOTRIPSY (ESWL), STENT INSERTION/REMOVAL      MOHS SURGERY      2 bcc    TONSILLECTOMY  1951       Social History     Socioeconomic History    Marital status:      Spouse name: Julita    Number of children: 1   Tobacco Use    Smoking status: Never    Smokeless tobacco: Never    Tobacco comments:     no history of smoking   Vaping Use    Vaping Use: Never used   Substance and Sexual Activity    Alcohol use: Yes     Alcohol/week: 6.0 standard drinks of alcohol     Types: 6 Cans of beer per week     Comment: 6 pk a week    Drug use: No    Sexual activity: Yes     Partners: Female     Birth  "control/protection: None       Family History   Problem Relation Age of Onset    Heart attack Mother          age 55    Heart disease Mother          at age of 54    Early death Father         unknown cause    Hypertension Paternal Uncle     Diabetes Maternal Grandmother     Malig Hyperthermia Neg Hx        The following portions of the patient's history were reviewed and updated as appropriate: allergies, current medications, past family history, past medical history, past social history, past surgical history and problem list.       Objective:       Vitals:    24 1331   Height: 177.8 cm (70\")       Constitutional:       Appearance: Well-developed.   Eyes:      General: No scleral icterus.     Conjunctiva/sclera: Conjunctivae normal.   HENT:      Head: Normocephalic and atraumatic.   Neck:      Thyroid: No thyromegaly.      Vascular: Normal carotid pulses. No carotid bruit, hepatojugular reflux or JVD.      Trachea: No tracheal deviation.   Pulmonary:      Effort: No respiratory distress.      Breath sounds: Normal breath sounds. No decreased breath sounds. No wheezing. No rhonchi. No rales.   Chest:      Chest wall: Not tender to palpatation.   Cardiovascular:      Normal rate. Regular rhythm.      No gallop.    Pulses:     Carotid: 2+ bilaterally.     Radial: 2+ bilaterally.     Femoral: 2+ bilaterally.     Dorsalis pedis: 2+ bilaterally.     Posterior tibial: 2+ bilaterally.  Edema:     Peripheral edema absent.   Abdominal:      General: Bowel sounds are normal. There is no distension.      Palpations: Abdomen is soft.      Tenderness: There is no abdominal tenderness.   Musculoskeletal:         General: No deformity.      Cervical back: Normal range of motion and neck supple. Skin:     Findings: No erythema or rash.   Neurological:      Mental Status: Alert and oriented to person, place, and time.      Sensory: No sensory deficit.   Psychiatric:         Behavior: Behavior normal.            Lab " "Results   Component Value Date     07/10/2023     12/02/2022    K 5.1 07/10/2023    K 4.5 12/02/2022     07/10/2023     12/02/2022    CO2 26.0 07/10/2023    CO2 26.0 12/02/2022    BUN 21 07/10/2023    BUN 23 12/02/2022    CREATININE 1.26 07/10/2023    CREATININE 1.16 12/02/2022    EGFRIFNONA 53 (L) 12/14/2021    EGFRIFNONA 52 (L) 08/31/2021    EGFRIFAFRI 61 12/14/2021    EGFRIFAFRI 63 08/31/2021    GLUCOSE 131 (H) 07/10/2023    GLUCOSE 124 (H) 12/02/2022    CALCIUM 10.1 07/10/2023    CALCIUM 9.1 12/02/2022    PROTENTOTREF 6.7 07/10/2023    PROTENTOTREF 6.1 12/02/2022    ALBUMIN 4.2 07/10/2023    ALBUMIN 4.00 12/02/2022    BILITOT 0.9 07/10/2023    BILITOT 1.0 12/02/2022    AST 24 07/10/2023    AST 19 12/02/2022    ALT 31 07/10/2023    ALT 32 12/02/2022     Lab Results   Component Value Date    WBC 5.40 07/10/2023    WBC 7.15 12/02/2022    HGB 14.8 07/10/2023    HGB 15.3 12/02/2022    HCT 43.2 07/10/2023    HCT 44.0 12/02/2022    MCV 96.2 07/10/2023    MCV 92.6 12/02/2022     07/10/2023     12/02/2022     Lab Results   Component Value Date    CHOL 114 05/14/2020    TRIG 113 07/10/2023    TRIG 217 (H) 06/01/2022    HDL 34 (L) 07/10/2023    HDL 28 (L) 06/01/2022    LDL 46 07/10/2023    LDL 58 06/01/2022     No results found for: \"PROBNP\", \"BNP\"  Lab Results   Component Value Date    TROPONINT <0.010 07/17/2018     Lab Results   Component Value Date    TSH 1.710 07/10/2023    TSH 2.200 06/01/2022           ECG 12 Lead    Date/Time: 1/18/2024 1:51 PM  Performed by: Shorty Zaidi MD    Authorized by: Shorty Zaidi MD  Comparison: compared with previous ECG from 8/2/2022  Similar to previous ECG  Rhythm: sinus rhythm  Rate: normal  QRS axis: normal    Clinical impression: normal ECG            7/19/2018  Left ventricular systolic function is normal. Estimated EF = 67%.      Assessment:     Plan:       Hypertension - BP is very slightly elevated on current medications, " 138/82 mmHg today.  I am not making any medication changes today. He will continue to monitor    Hyperlipidemia - 7/2023 lipid panel reviewed with good control.  Continue atorvastatin.  AST and ALT okay on lab work this year    Paroxysmal SVT - denies palpitations.  Continue carvedilol        01/18/24  13:43 EST

## 2024-02-01 DIAGNOSIS — E78.2 MIXED HYPERLIPIDEMIA: ICD-10-CM

## 2024-02-01 DIAGNOSIS — E11.9 CONTROLLED TYPE 2 DIABETES MELLITUS WITHOUT COMPLICATION, WITHOUT LONG-TERM CURRENT USE OF INSULIN: Primary | ICD-10-CM

## 2024-02-01 DIAGNOSIS — I10 PRIMARY HYPERTENSION: ICD-10-CM

## 2024-02-02 LAB
ALBUMIN SERPL-MCNC: 4.4 G/DL (ref 3.5–5.2)
ALBUMIN/GLOB SERPL: 2 G/DL
ALP SERPL-CCNC: 64 U/L (ref 39–117)
ALT SERPL-CCNC: 42 U/L (ref 1–41)
AST SERPL-CCNC: 30 U/L (ref 1–40)
BASOPHILS # BLD AUTO: 0.03 10*3/MM3 (ref 0–0.2)
BASOPHILS NFR BLD AUTO: 0.5 % (ref 0–1.5)
BILIRUB SERPL-MCNC: 0.9 MG/DL (ref 0–1.2)
BUN SERPL-MCNC: 20 MG/DL (ref 8–23)
BUN/CREAT SERPL: 14.6 (ref 7–25)
CALCIUM SERPL-MCNC: 9.6 MG/DL (ref 8.6–10.5)
CHLORIDE SERPL-SCNC: 104 MMOL/L (ref 98–107)
CHOLEST SERPL-MCNC: 96 MG/DL (ref 0–200)
CO2 SERPL-SCNC: 26.2 MMOL/L (ref 22–29)
CREAT SERPL-MCNC: 1.37 MG/DL (ref 0.76–1.27)
EGFRCR SERPLBLD CKD-EPI 2021: 53.8 ML/MIN/1.73
EOSINOPHIL # BLD AUTO: 0.2 10*3/MM3 (ref 0–0.4)
EOSINOPHIL NFR BLD AUTO: 3.6 % (ref 0.3–6.2)
ERYTHROCYTE [DISTWIDTH] IN BLOOD BY AUTOMATED COUNT: 12.5 % (ref 12.3–15.4)
GLOBULIN SER CALC-MCNC: 2.2 GM/DL
GLUCOSE SERPL-MCNC: 125 MG/DL (ref 65–99)
HBA1C MFR BLD: 6.1 % (ref 4.8–5.6)
HCT VFR BLD AUTO: 44.2 % (ref 37.5–51)
HDLC SERPL-MCNC: 32 MG/DL (ref 40–60)
HGB BLD-MCNC: 15.1 G/DL (ref 13–17.7)
IMM GRANULOCYTES # BLD AUTO: 0.02 10*3/MM3 (ref 0–0.05)
IMM GRANULOCYTES NFR BLD AUTO: 0.4 % (ref 0–0.5)
LDLC SERPL CALC-MCNC: 41 MG/DL (ref 0–100)
LYMPHOCYTES # BLD AUTO: 0.7 10*3/MM3 (ref 0.7–3.1)
LYMPHOCYTES NFR BLD AUTO: 12.5 % (ref 19.6–45.3)
MCH RBC QN AUTO: 32.4 PG (ref 26.6–33)
MCHC RBC AUTO-ENTMCNC: 34.2 G/DL (ref 31.5–35.7)
MCV RBC AUTO: 94.8 FL (ref 79–97)
MONOCYTES # BLD AUTO: 0.73 10*3/MM3 (ref 0.1–0.9)
MONOCYTES NFR BLD AUTO: 13 % (ref 5–12)
NEUTROPHILS # BLD AUTO: 3.93 10*3/MM3 (ref 1.7–7)
NEUTROPHILS NFR BLD AUTO: 70 % (ref 42.7–76)
NRBC BLD AUTO-RTO: 0 /100 WBC (ref 0–0.2)
PLATELET # BLD AUTO: 209 10*3/MM3 (ref 140–450)
POTASSIUM SERPL-SCNC: 4.4 MMOL/L (ref 3.5–5.2)
PROT SERPL-MCNC: 6.6 G/DL (ref 6–8.5)
RBC # BLD AUTO: 4.66 10*6/MM3 (ref 4.14–5.8)
SODIUM SERPL-SCNC: 140 MMOL/L (ref 136–145)
TRIGL SERPL-MCNC: 129 MG/DL (ref 0–150)
VLDLC SERPL CALC-MCNC: 23 MG/DL (ref 5–40)
WBC # BLD AUTO: 5.61 10*3/MM3 (ref 3.4–10.8)

## 2024-02-09 ENCOUNTER — OFFICE VISIT (OUTPATIENT)
Dept: INTERNAL MEDICINE | Facility: CLINIC | Age: 74
End: 2024-02-09
Payer: MEDICARE

## 2024-02-09 VITALS
OXYGEN SATURATION: 99 % | BODY MASS INDEX: 34.5 KG/M2 | WEIGHT: 241 LBS | SYSTOLIC BLOOD PRESSURE: 120 MMHG | HEIGHT: 70 IN | HEART RATE: 76 BPM | DIASTOLIC BLOOD PRESSURE: 70 MMHG

## 2024-02-09 DIAGNOSIS — I10 PRIMARY HYPERTENSION: ICD-10-CM

## 2024-02-09 DIAGNOSIS — E78.2 MIXED HYPERLIPIDEMIA: ICD-10-CM

## 2024-02-09 DIAGNOSIS — E11.9 CONTROLLED TYPE 2 DIABETES MELLITUS WITHOUT COMPLICATION, WITHOUT LONG-TERM CURRENT USE OF INSULIN: Primary | ICD-10-CM

## 2024-02-09 NOTE — PROGRESS NOTES
Subjective     Sachin Almanzar is a 73 y.o. male who presents with   Chief Complaint   Patient presents with    Diabetes    Hypertension    Hyperlipidemia       Diabetes    Hypertension    Hyperlipidemia         The following data was reviewed by: Yvonne Chacko MD on 02/09/2024:  Common labs          7/10/2023    09:23 2/2/2024    09:56   Common Labs   Glucose 131  125    BUN 21  20    Creatinine 1.26  1.37    Sodium 141  140    Potassium 5.1  4.4    Chloride 105  104    Calcium 10.1  9.6    Total Protein 6.7  6.6    Albumin 4.2  4.4    Total Bilirubin 0.9  0.9    Alkaline Phosphatase 57  64    AST (SGOT) 24  30    ALT (SGPT) 31  42    WBC 5.40  5.61    Hemoglobin 14.8  15.1    Hematocrit 43.2  44.2    Platelets 196  209    Total Cholesterol 101  96    Triglycerides 113  129    HDL Cholesterol 34  32    LDL Cholesterol  46  41    Hemoglobin A1C 5.70  6.10    Microalbumin, Urine 14.8     PSA 0.488       DM-2.  Hgb A1c is at goal.   HTN.  Blood pressure is under good control.  HLD.  LDL cholesterol is under good control.        Review of Systems   Respiratory: Negative.     Cardiovascular: Negative.        The following portions of the patient's history were reviewed and updated as appropriate: allergies, current medications and problem list.    Patient Active Problem List    Diagnosis Date Noted    History of adenomatous polyp of colon 08/15/2023     Note Last Updated: 8/15/2023     Added automatically from request for surgery [id number]      Diabetes mellitus type II, controlled, with no complications 08/23/2021    Stage 3 chronic kidney disease 01/10/2019    Multiple renal cysts 05/07/2018    Allergy to shellfish 04/19/2017    Venous insufficiency of both lower extremities 04/21/2016    Paroxysmal SVT (supraventricular tachycardia) 03/24/2014     Note Last Updated: 7/27/2018     Overview:   200 heart rate, after caffeine (no longer uses caffeine)    Overview:   200 heart rate, after caffeine (no longer uses  caffeine)      Erectile dysfunction 02/25/2013    Gastroesophageal reflux disease 02/25/2013    Allergic rhinitis 07/10/2012    Hyperlipidemia 07/10/2012    Hypertension 07/10/2012    Nephrolithiasis 07/10/2012     Note Last Updated: 7/27/2018     Overview:   Recurrent, Fall of 2016 (Dr. Muniz)  Had shockwave lithotripsy    Overview:   Recurrent, Fall of 2016 (Dr. Muniz)  Had shockwave lithotripsy      Obstructive sleep apnea on CPAP 07/10/2012    History of bladder cancer 07/10/2012     Note Last Updated: 8/26/2019     Overview:   3/2011, Dr. Muniz, TURBT, BCG and infusion of chemo         Current Outpatient Medications on File Prior to Visit   Medication Sig Dispense Refill    atorvastatin (LIPITOR) 20 MG tablet TAKE 1 TABLET BY MOUTH EVERY NIGHT 90 tablet 3    carvedilol (COREG) 6.25 MG tablet TAKE 1 TABLET BY MOUTH EVERY 12 HOURS 180 tablet 3    cyclobenzaprine (FLEXERIL) 10 MG tablet Take 1 tablet by mouth 3 (Three) Times a Day As Needed for Muscle Spasms. 30 tablet 0    doxycycline (VIBRAMYCIN) 100 MG capsule Take 1 capsule by mouth Daily As Needed (FOR SKIN ISSUES).      EPINEPHrine (EPIPEN) 0.3 MG/0.3ML solution auto-injector injection Inject 0.3 mL into the appropriate muscle as directed by prescriber 1 (One) Time As Needed (anaphylasis) for up to 6 doses. 1 each 5    famotidine (PEPCID) 20 MG tablet Take 1 tablet by mouth Daily.      fexofenadine (ALLEGRA) 180 MG tablet Take 1 tablet by mouth Daily.      Lancets (OneTouch Delica Plus [id number]) misc Daily. for testing      melatonin 1 MG tablet Take 3 tablets by mouth At Night As Needed for Sleep.      NIFEdipine XL (PROCARDIA XL) 30 MG 24 hr tablet TAKE 1 TABLET BY MOUTH DAILY 90 tablet 0    OneTouch Verio test strip USE ONCE DAILY FOR TYPE 2 DIABETES. DX: E11.9 100 each 5    sildenafil (VIAGRA) 100 MG tablet Take 1 tablet by mouth As Needed for Erectile Dysfunction.       No current facility-administered medications on file prior to visit.  "      Objective     /70   Pulse 76   Ht 177.8 cm (70\")   Wt 109 kg (241 lb)   SpO2 99%   BMI 34.58 kg/m²     Physical Exam  Constitutional:       Appearance: He is well-developed.   HENT:      Head: Atraumatic.   Cardiovascular:      Rate and Rhythm: Normal rate and regular rhythm.      Heart sounds: Normal heart sounds.   Pulmonary:      Effort: Pulmonary effort is normal.      Breath sounds: Normal breath sounds.   Skin:     General: Skin is warm and dry.   Neurological:      Mental Status: He is alert and oriented to person, place, and time.         Assessment & Plan   Diagnoses and all orders for this visit:    1. Controlled type 2 diabetes mellitus without complication, without long-term current use of insulin (Primary)    2. Primary hypertension    3. Mixed hyperlipidemia        Discussion    Dm-2.  Control is at goal.  Continue attention to healthy diet.    HTN.  Control is good.  The patient is advised to continue current dosage of carvedilol and nifedipine.   HLD.  Control is good.  The patient is advised to continue current dosage of atorvastatin.      Plan f/u [facility] in six months.                 Future Appointments   Date Time Provider Department Center   1/20/2025  1:00 PM Patti Boothe APRN [facility]         "

## 2024-03-11 ENCOUNTER — OFFICE VISIT (OUTPATIENT)
Dept: INTERNAL MEDICINE | Facility: CLINIC | Age: 74
End: 2024-03-11
Payer: MEDICARE

## 2024-03-11 VITALS
HEIGHT: 70 IN | SYSTOLIC BLOOD PRESSURE: 132 MMHG | BODY MASS INDEX: 34.22 KG/M2 | WEIGHT: 239 LBS | HEART RATE: 67 BPM | DIASTOLIC BLOOD PRESSURE: 79 MMHG | OXYGEN SATURATION: 97 %

## 2024-03-11 DIAGNOSIS — R07.9 CHEST PAIN, UNSPECIFIED TYPE: Primary | ICD-10-CM

## 2024-03-11 DIAGNOSIS — B02.9 HERPES ZOSTER WITHOUT COMPLICATION: ICD-10-CM

## 2024-03-11 PROCEDURE — 3044F HG A1C LEVEL LT 7.0%: CPT | Performed by: INTERNAL MEDICINE

## 2024-03-11 PROCEDURE — 3075F SYST BP GE 130 - 139MM HG: CPT | Performed by: INTERNAL MEDICINE

## 2024-03-11 PROCEDURE — 1160F RVW MEDS BY RX/DR IN RCRD: CPT | Performed by: INTERNAL MEDICINE

## 2024-03-11 PROCEDURE — 3078F DIAST BP <80 MM HG: CPT | Performed by: INTERNAL MEDICINE

## 2024-03-11 PROCEDURE — 93000 ELECTROCARDIOGRAM COMPLETE: CPT | Performed by: INTERNAL MEDICINE

## 2024-03-11 PROCEDURE — 1159F MED LIST DOCD IN RCRD: CPT | Performed by: INTERNAL MEDICINE

## 2024-03-11 PROCEDURE — 99214 OFFICE O/P EST MOD 30 MIN: CPT | Performed by: INTERNAL MEDICINE

## 2024-03-11 RX ORDER — LATANOPROST 50 UG/ML
SOLUTION/ DROPS OPHTHALMIC
COMMUNITY
Start: 2024-02-09

## 2024-03-11 RX ORDER — ATORVASTATIN CALCIUM 20 MG/1
20 TABLET, FILM COATED ORAL NIGHTLY
Qty: 90 TABLET | Refills: 3 | Status: SHIPPED | OUTPATIENT
Start: 2024-03-11

## 2024-03-11 RX ORDER — VALACYCLOVIR HYDROCHLORIDE 1 G/1
1000 TABLET, FILM COATED ORAL 3 TIMES DAILY
Qty: 21 TABLET | Refills: 0 | Status: SHIPPED | OUTPATIENT
Start: 2024-03-11

## 2024-03-11 NOTE — PROGRESS NOTES
"Chief Complaint   Patient presents with    Muscle Pain     Started Friday \"twitching\" pain started in back, more generalized. Pt has been moving boxes. Pain now radiates from back to around the front of the left shoulder onto his chest.        Muscle Pain  Associated symptoms include chest pain and a rash. Pertinent negatives include no shortness of breath.      Sachin Almanzar is a 73 y.o. male presents for acute care. Pateint notes left side chest pain. Notes discomfort starting w/ a radiating \"twinge\" starting in left scapula and moved around anteriorly. He was moving boxes and discomfort started 3 days ago. Now discomfort is also anterior.   Patient reports fam h/o cad mother. Pateint has htn, dm, hyperlipidemia. Reports lifelong non smoker. Follows w/ cardiology related to a remote episode of syncope. Neg cardiac stress 2018. No shortness of breath, no diaphoresis, symptoms are not specifically w/ exertion it is all of the time.   New onset rash starting 2 days ago.         The following portions of the patient's history were reviewed and updated as appropriate: allergies, current medications, past family history, past medical history, past social history, past surgical history and problem list.  Current Outpatient Medications on File Prior to Visit   Medication Sig Dispense Refill    atorvastatin (LIPITOR) 20 MG tablet TAKE 1 TABLET BY MOUTH EVERY NIGHT 90 tablet 3    carvedilol (COREG) 6.25 MG tablet TAKE 1 TABLET BY MOUTH EVERY 12 HOURS 180 tablet 3    doxycycline (VIBRAMYCIN) 100 MG capsule Take 1 capsule by mouth Daily As Needed (FOR SKIN ISSUES).      EPINEPHrine (EPIPEN) 0.3 MG/0.3ML solution auto-injector injection Inject 0.3 mL into the appropriate muscle as directed by prescriber 1 (One) Time As Needed (anaphylasis) for up to 6 doses. 1 each 5    famotidine (PEPCID) 20 MG tablet Take 1 tablet by mouth Daily.      fexofenadine (ALLEGRA) 180 MG tablet Take 1 tablet by mouth Daily.      Lancets " (OneTouch Delica Plus [id number]) misc Daily. for testing      latanoprost (XALATAN) 0.005 % ophthalmic solution instill 1 drop in both eyes every night at bedtime      melatonin 1 MG tablet Take 3 tablets by mouth At Night As Needed for Sleep.      NIFEdipine XL (PROCARDIA XL) 30 MG 24 hr tablet TAKE 1 TABLET BY MOUTH DAILY 90 tablet 0    OneTouch Verio test strip USE ONCE DAILY FOR TYPE 2 DIABETES. DX: E11.9 100 each 5    sildenafil (VIAGRA) 100 MG tablet Take 1 tablet by mouth As Needed for Erectile Dysfunction.      cyclobenzaprine (FLEXERIL) 10 MG tablet Take 1 tablet by mouth 3 (Three) Times a Day As Needed for Muscle Spasms. (Patient not taking: Reported on 3/11/2024) 30 tablet 0     No current facility-administered medications on file prior to visit.     Review of Systems   Constitutional: Negative.    HENT: Negative.     Eyes: Negative.    Respiratory:  Negative for shortness of breath.    Cardiovascular:  Positive for chest pain. Negative for palpitations.   Gastrointestinal: Negative.    Endocrine: Negative.    Genitourinary: Negative.    Musculoskeletal: Negative.    Skin:  Positive for rash.   Allergic/Immunologic: Negative.    Neurological: Negative.    Hematological: Negative.    Psychiatric/Behavioral: Negative.         Objective   Physical Exam  Vitals and nursing note reviewed.   Constitutional:       Appearance: Normal appearance.   HENT:      Head: Normocephalic and atraumatic.      Right Ear: Tympanic membrane normal.      Left Ear: Tympanic membrane normal.      Nose: Nose normal.      Mouth/Throat:      Mouth: Mucous membranes are moist.   Eyes:      Extraocular Movements: Extraocular movements intact.      Pupils: Pupils are equal, round, and reactive to light.   Cardiovascular:      Rate and Rhythm: Normal rate and regular rhythm.      Pulses: Normal pulses.      Heart sounds: Normal heart sounds.   Pulmonary:      Effort: Pulmonary effort is normal.      Breath sounds: Normal breath sounds.  "  Abdominal:      General: Abdomen is flat.      Palpations: Abdomen is soft.   Musculoskeletal:         General: Normal range of motion.      Cervical back: Normal range of motion.   Skin:     General: Skin is warm and dry.      Findings: Rash present.   Neurological:      General: No focal deficit present.      Mental Status: He is alert and oriented to person, place, and time.   Psychiatric:         Mood and Affect: Mood normal.         Behavior: Behavior normal.         Thought Content: Thought content normal.         Judgment: Judgment normal.        EKG for chest pain. Sinus 65 bpm no st changes. Unchanged prior.     /79 (BP Location: Right arm, Patient Position: Sitting)   Pulse 67   Ht 177.8 cm (70\")   Wt 108 kg (239 lb)   SpO2 97%   BMI 34.29 kg/m²     Assessment & Plan   Diagnoses and all orders for this visit:    Chest pain, unspecified type  -     ECG 12 Lead  -     XR Chest 2 View; Future  -     Treadmill Stress Test; Future    Herpes zoster without complication    Other orders  -     latanoprost (XALATAN) 0.005 % ophthalmic solution; instill 1 drop in both eyes every night at bedtime  -     valACYclovir (Valtrex) 1000 MG tablet; Take 1 tablet by mouth 3 (Three) Times a Day.      Patient w/ chest pain. Predominantly atypical in nature. EKG and CXR reassuring. Given pattern of discomfort and associated rash, strongly suspect this is zoster. He will start valtrex one tab tid. He is to use topical lidocaine and tylenol as needed for relief. He may start gabapentin if needed but denies need for that at this time. To ensure no other issues will get stress testing as well. He will have listed labs.          "

## 2024-03-15 ENCOUNTER — TELEPHONE (OUTPATIENT)
Dept: INTERNAL MEDICINE | Facility: CLINIC | Age: 74
End: 2024-03-15
Payer: MEDICARE

## 2024-03-15 NOTE — TELEPHONE ENCOUNTER
Pt informed    ----- Message from Pamela Hammer MD sent at 3/14/2024  6:03 PM EDT -----  No suspicious findings on chest xray.   jw

## 2024-04-15 RX ORDER — NIFEDIPINE 30 MG/1
30 TABLET, EXTENDED RELEASE ORAL DAILY
Qty: 90 TABLET | Refills: 0 | Status: SHIPPED | OUTPATIENT
Start: 2024-04-15 | End: 2024-04-20 | Stop reason: SDUPTHER

## 2024-04-20 ENCOUNTER — TELEPHONE (OUTPATIENT)
Dept: CARDIOLOGY | Facility: CLINIC | Age: 74
End: 2024-04-20
Payer: MEDICARE

## 2024-04-20 RX ORDER — NIFEDIPINE 30 MG/1
30 TABLET, EXTENDED RELEASE ORAL DAILY
Qty: 10 TABLET | Refills: 0 | Status: SHIPPED | OUTPATIENT
Start: 2024-04-20

## 2024-04-20 NOTE — TELEPHONE ENCOUNTER
Mr. Almanzar paged the answering service and I am on-call.  He said he needs a medication to be refilled.  I tried calling him back and there was no answer.  I am not sure what medication he needs.  I asked him to call the answering service again.

## 2024-04-20 NOTE — TELEPHONE ENCOUNTER
He said the nifedipine prescription was sent into [facility].  It was marked that he received it, but it was not in the bag when he got home.  He needs a 10-day prescription of nifedipine sent to the [facility] is open on the weekends.  I sent it to [place] in [facility] pharmacy.

## 2024-04-23 RX ORDER — NIFEDIPINE 30 MG/1
30 TABLET, EXTENDED RELEASE ORAL DAILY
Qty: 90 TABLET | Refills: 3 | Status: SHIPPED | OUTPATIENT
Start: 2024-04-23

## 2024-06-10 RX ORDER — EPINEPHRINE 0.3 MG/.3ML
0.3 INJECTION SUBCUTANEOUS ONCE AS NEEDED
Qty: 1 EACH | Refills: 5 | Status: SHIPPED | OUTPATIENT
Start: 2024-06-10

## 2024-08-26 DIAGNOSIS — E11.9 CONTROLLED TYPE 2 DIABETES MELLITUS WITHOUT COMPLICATION, WITHOUT LONG-TERM CURRENT USE OF INSULIN: ICD-10-CM

## 2024-08-26 DIAGNOSIS — E78.2 MIXED HYPERLIPIDEMIA: ICD-10-CM

## 2024-08-26 DIAGNOSIS — I10 PRIMARY HYPERTENSION: Primary | ICD-10-CM

## 2024-08-26 DIAGNOSIS — Z12.5 SCREENING PSA (PROSTATE SPECIFIC ANTIGEN): ICD-10-CM

## 2024-08-30 LAB
ALBUMIN SERPL-MCNC: 4 G/DL (ref 3.5–5.2)
ALBUMIN/CREAT UR: 25 MG/G CREAT (ref 0–29)
ALBUMIN/GLOB SERPL: 1.6 G/DL
ALP SERPL-CCNC: 60 U/L (ref 39–117)
ALT SERPL-CCNC: 35 U/L (ref 1–41)
APPEARANCE UR: CLEAR
AST SERPL-CCNC: 27 U/L (ref 1–40)
BACTERIA #/AREA URNS HPF: ABNORMAL /HPF
BASOPHILS # BLD AUTO: 0.04 10*3/MM3 (ref 0–0.2)
BASOPHILS NFR BLD AUTO: 0.7 % (ref 0–1.5)
BILIRUB SERPL-MCNC: 1 MG/DL (ref 0–1.2)
BILIRUB UR QL STRIP: NEGATIVE
BUN SERPL-MCNC: 25 MG/DL (ref 8–23)
BUN/CREAT SERPL: 19.2 (ref 7–25)
CALCIUM SERPL-MCNC: 9.5 MG/DL (ref 8.6–10.5)
CASTS URNS MICRO: ABNORMAL
CHLORIDE SERPL-SCNC: 105 MMOL/L (ref 98–107)
CHOLEST SERPL-MCNC: 106 MG/DL (ref 0–200)
CO2 SERPL-SCNC: 24.1 MMOL/L (ref 22–29)
COLOR UR: YELLOW
CREAT SERPL-MCNC: 1.3 MG/DL (ref 0.76–1.27)
CREAT UR-MCNC: 124.8 MG/DL
EGFRCR SERPLBLD CKD-EPI 2021: 56.9 ML/MIN/1.73
EOSINOPHIL # BLD AUTO: 0.18 10*3/MM3 (ref 0–0.4)
EOSINOPHIL NFR BLD AUTO: 3 % (ref 0.3–6.2)
EPI CELLS #/AREA URNS HPF: ABNORMAL /HPF
ERYTHROCYTE [DISTWIDTH] IN BLOOD BY AUTOMATED COUNT: 13.1 % (ref 12.3–15.4)
GLOBULIN SER CALC-MCNC: 2.5 GM/DL
GLUCOSE SERPL-MCNC: 133 MG/DL (ref 65–99)
GLUCOSE UR QL STRIP: NEGATIVE
HBA1C MFR BLD: 5.9 % (ref 4.8–5.6)
HCT VFR BLD AUTO: 43.9 % (ref 37.5–51)
HDLC SERPL-MCNC: 34 MG/DL (ref 40–60)
HGB BLD-MCNC: 14.7 G/DL (ref 13–17.7)
HGB UR QL STRIP: NEGATIVE
IMM GRANULOCYTES # BLD AUTO: 0.03 10*3/MM3 (ref 0–0.05)
IMM GRANULOCYTES NFR BLD AUTO: 0.5 % (ref 0–0.5)
KETONES UR QL STRIP: NEGATIVE
LDLC SERPL CALC-MCNC: 48 MG/DL (ref 0–100)
LEUKOCYTE ESTERASE UR QL STRIP: ABNORMAL
LYMPHOCYTES # BLD AUTO: 0.88 10*3/MM3 (ref 0.7–3.1)
LYMPHOCYTES NFR BLD AUTO: 14.7 % (ref 19.6–45.3)
MCH RBC QN AUTO: 32 PG (ref 26.6–33)
MCHC RBC AUTO-ENTMCNC: 33.5 G/DL (ref 31.5–35.7)
MCV RBC AUTO: 95.6 FL (ref 79–97)
MICROALBUMIN UR-MCNC: 31.1 UG/ML
MONOCYTES # BLD AUTO: 0.74 10*3/MM3 (ref 0.1–0.9)
MONOCYTES NFR BLD AUTO: 12.4 % (ref 5–12)
NEUTROPHILS # BLD AUTO: 4.11 10*3/MM3 (ref 1.7–7)
NEUTROPHILS NFR BLD AUTO: 68.7 % (ref 42.7–76)
NITRITE UR QL STRIP: NEGATIVE
NRBC BLD AUTO-RTO: 0 /100 WBC (ref 0–0.2)
PH UR STRIP: 6 [PH] (ref 5–8)
PLATELET # BLD AUTO: 208 10*3/MM3 (ref 140–450)
POTASSIUM SERPL-SCNC: 4.3 MMOL/L (ref 3.5–5.2)
PROT SERPL-MCNC: 6.5 G/DL (ref 6–8.5)
PROT UR QL STRIP: ABNORMAL
PSA SERPL-MCNC: 0.63 NG/ML (ref 0–4)
RBC # BLD AUTO: 4.59 10*6/MM3 (ref 4.14–5.8)
RBC #/AREA URNS HPF: ABNORMAL /HPF
SODIUM SERPL-SCNC: 138 MMOL/L (ref 136–145)
SP GR UR STRIP: 1.02 (ref 1–1.03)
TRIGL SERPL-MCNC: 133 MG/DL (ref 0–150)
TSH SERPL DL<=0.005 MIU/L-ACNC: 1.43 UIU/ML (ref 0.27–4.2)
UROBILINOGEN UR STRIP-MCNC: ABNORMAL MG/DL
VLDLC SERPL CALC-MCNC: 24 MG/DL (ref 5–40)
WBC # BLD AUTO: 5.98 10*3/MM3 (ref 3.4–10.8)
WBC #/AREA URNS HPF: ABNORMAL /HPF

## 2024-09-03 ENCOUNTER — OFFICE VISIT (OUTPATIENT)
Dept: INTERNAL MEDICINE | Facility: CLINIC | Age: 74
End: 2024-09-03
Payer: MEDICARE

## 2024-09-03 VITALS
HEIGHT: 70 IN | HEART RATE: 79 BPM | WEIGHT: 241 LBS | DIASTOLIC BLOOD PRESSURE: 62 MMHG | OXYGEN SATURATION: 98 % | SYSTOLIC BLOOD PRESSURE: 124 MMHG | BODY MASS INDEX: 34.5 KG/M2

## 2024-09-03 DIAGNOSIS — Z00.00 WELL ADULT EXAM: ICD-10-CM

## 2024-09-03 DIAGNOSIS — Z00.00 MEDICARE ANNUAL WELLNESS VISIT, SUBSEQUENT: Primary | ICD-10-CM

## 2024-09-03 DIAGNOSIS — E11.22 TYPE 2 DIABETES MELLITUS WITH STAGE 3A CHRONIC KIDNEY DISEASE, WITHOUT LONG-TERM CURRENT USE OF INSULIN: ICD-10-CM

## 2024-09-03 DIAGNOSIS — N18.31 TYPE 2 DIABETES MELLITUS WITH STAGE 3A CHRONIC KIDNEY DISEASE, WITHOUT LONG-TERM CURRENT USE OF INSULIN: ICD-10-CM

## 2024-09-03 DIAGNOSIS — N18.31 STAGE 3A CHRONIC KIDNEY DISEASE: ICD-10-CM

## 2024-09-03 DIAGNOSIS — I10 PRIMARY HYPERTENSION: ICD-10-CM

## 2024-09-03 DIAGNOSIS — E78.2 MIXED HYPERLIPIDEMIA: ICD-10-CM

## 2024-09-03 NOTE — PROGRESS NOTES
Subjective   The ABCs of the Annual Wellness Visit  Medicare Wellness Visit      Sachin Almanzar is a 74 y.o. patient who presents for a Medicare Wellness Visit.    The following portions of the patient's history were reviewed and   updated as appropriate: allergies, current medications, past family history, past medical history, past social history, past surgical history, and problem list.    Compared to one year ago, the patient's physical   health is the same.  Compared to one year ago, the patient's mental   health is the same.    Recent Hospitalizations:  He was not admitted to the hospital during the last year.     Current Medical Providers:  Patient Care Team:  Yvonne Chacko MD as PCP - General (Internal Medicine)  René Muniz MD as Consulting Physician (Urology)  Rocael Steen MD as Consulting Physician (Dermatology)  Baljeet Roach MD as Consulting Physician (Allergy)  Sumanth Kamara OD (Optometry)    Outpatient Medications Prior to Visit   Medication Sig Dispense Refill    atorvastatin (LIPITOR) 20 MG tablet TAKE 1 TABLET BY MOUTH EVERY NIGHT 90 tablet 3    carvedilol (COREG) 6.25 MG tablet TAKE 1 TABLET BY MOUTH EVERY 12 HOURS 180 tablet 3    cyclobenzaprine (FLEXERIL) 10 MG tablet Take 1 tablet by mouth 3 (Three) Times a Day As Needed for Muscle Spasms. 30 tablet 0    doxycycline (VIBRAMYCIN) 100 MG capsule Take 1 capsule by mouth Daily As Needed (FOR SKIN ISSUES).      EPINEPHrine (EPIPEN) 0.3 MG/0.3ML solution auto-injector injection Inject 0.3 mL into the appropriate muscle as directed by prescriber 1 (One) Time As Needed (anaphylasis) for up to 6 doses. 1 each 5    famotidine (PEPCID) 20 MG tablet Take 1 tablet by mouth Daily.      fexofenadine (ALLEGRA) 180 MG tablet Take 1 tablet by mouth Daily.      Lancets (OneTouch Delica Plus [id number]) misc Daily. for testing      latanoprost (XALATAN) 0.005 % ophthalmic solution instill 1 drop in both eyes every night at  "bedtime      melatonin 1 MG tablet Take 3 tablets by mouth At Night As Needed for Sleep.      NIFEdipine XL (PROCARDIA XL) 30 MG 24 hr tablet TAKE 1 TABLET BY MOUTH DAILY 90 tablet 3    OneTouch Verio test strip USE ONCE DAILY FOR TYPE 2 DIABETES. DX: E11.9 100 each 5    sildenafil (VIAGRA) 100 MG tablet Take 1 tablet by mouth As Needed for Erectile Dysfunction.      valACYclovir (Valtrex) 1000 MG tablet Take 1 tablet by mouth 3 (Three) Times a Day. 21 tablet 0     No facility-administered medications prior to visit.     No opioid medication identified on active medication list. I have reviewed chart for other potential  high risk medication/s and harmful drug interactions in the elderly.      Aspirin is not on active medication list.  Aspirin use is not indicated based on review of current medical condition/s. Risk of harm outweighs potential benefits.  .    Patient Active Problem List   Diagnosis    Allergic rhinitis    Erectile dysfunction    Gastroesophageal reflux disease    Hyperlipidemia    Hypertension    Nephrolithiasis    Obstructive sleep apnea on CPAP    Paroxysmal SVT (supraventricular tachycardia)    Venous insufficiency of both lower extremities    Allergy to shellfish    Multiple renal cysts    Stage 3 chronic kidney disease    History of bladder cancer    Diabetes mellitus type II, controlled, with no complications    History of adenomatous polyp of colon     Advance Care Planning Advance Directive is not on file.  ACP discussion was held with the patient during this visit. Patient has an advance directive (not in EMR), copy requested.            Objective   Vitals:    09/03/24 0746   BP: 124/62   Pulse: 79   SpO2: 98%   Weight: 109 kg (241 lb)   Height: 177.8 cm (70\")   PainSc: 0-No pain       Estimated body mass index is 34.58 kg/m² as calculated from the following:    Height as of this encounter: 177.8 cm (70\").    Weight as of this encounter: 109 kg (241 lb).            Does the patient have " evidence of cognitive impairment? No  Lab Results   Component Value Date    CHLPL 106 2024    TRIG 133 2024    HDL 34 (L) 2024    LDL 48 2024    VLDL 24 2024    HGBA1C 5.90 (H) 2024                                                                                                Health  Risk Assessment    Smoking Status:  Social History     Tobacco Use   Smoking Status Never   Smokeless Tobacco Never   Tobacco Comments    no history of smoking     Alcohol Consumption:  Social History     Substance and Sexual Activity   Alcohol Use Yes    Alcohol/week: 6.0 standard drinks of alcohol    Types: 6 Cans of beer per week    Comment: 6 pk a week       Fall Risk Screen  STEADI Fall Risk Assessment was completed, and patient is at LOW risk for falls.Assessment completed on:9/3/2024    Depression Screenin/3/2024     7:46 AM   PHQ-2/PHQ-9 Depression Screening   Little Interest or Pleasure in Doing Things 0-->not at all   Feeling Down, Depressed or Hopeless 0-->not at all   PHQ-9: Brief Depression Severity Measure Score 0     Health Habits and Functional and Cognitive Screenin/3/2024     5:48 AM   Functional & Cognitive Status   Do you have difficulty preparing food and eating? No   Do you have difficulty bathing yourself, getting dressed or grooming yourself? No   Do you have difficulty using the toilet? No   Do you have difficulty moving around from place to place? No   Do you have trouble with steps or getting out of a bed or a chair? No   Current Diet Well Balanced Diet   Dental Exam Up to date   Eye Exam Up to date   Exercise (times per week) 2 times per week   Current Exercises Include Walking   Do you need help using the phone?  No   Are you deaf or do you have serious difficulty hearing?  No   Do you need help to go to places out of walking distance? No   Do you need help shopping? No   Do you need help preparing meals?  No   Do you need help with housework?  No   Do  you need help with laundry? No   Do you need help taking your medications? No   Do you need help managing money? No   Do you ever drive or ride in a car without wearing a seat belt? No   Have you felt unusual stress, anger or loneliness in the last month? No   Who do you live with? Spouse   If you need help, do you have trouble finding someone available to you? No   Have you been bothered in the last four weeks by sexual problems? No   Do you have difficulty concentrating, remembering or making decisions? No           Age-appropriate Screening Schedule:  Refer to the list below for future screening recommendations based on patient's age, sex and/or medical conditions. Orders for these recommended tests are listed in the plan section. The patient has been provided with a written plan.    Health Maintenance List  Health Maintenance   Topic Date Due    RSV Vaccine - Adults (1 - 1-dose 60+ series) Never done    TDAP/TD VACCINES (2 - Td or Tdap) 02/25/2023    ANNUAL WELLNESS VISIT  07/14/2024    COVID-19 Vaccine (8 - 2023-24 season) 09/01/2024    INFLUENZA VACCINE  08/01/2024    HEMOGLOBIN A1C  02/28/2025    DIABETIC EYE EXAM  08/20/2025    LIPID PANEL  08/29/2025    URINE MICROALBUMIN  08/29/2025    BMI FOLLOWUP  09/03/2025    COLORECTAL CANCER SCREENING  09/26/2028    HEPATITIS C SCREENING  Completed    Pneumococcal Vaccine 65+  Completed    ZOSTER VACCINE  Completed                                                                                                                                                CMS Preventative Services Quick Reference  Risk Factors Identified During Encounter  Immunizations Discussed/Encouraged: Influenza, COVID19, and RSV (Respiratory Syncytial Virus)    The above risks/problems have been discussed with the patient.  Pertinent information has been shared with the patient in the After Visit Summary.  An After Visit Summary and PPPS were made available to the patient.    Follow Up:   Next  "Medicare Wellness visit to be scheduled in 1 year.         Additional E&M Note during same encounter follows:  Patient has additional, significant, and separately identifiable condition(s)/problem(s) that require work above and beyond the Medicare Wellness Visit     Chief Complaint  Medicare Wellness-subsequent    Subjective   HPI  Charles is also being seen today for an annual adult preventative physical exam.         DM-2.  Hgb A1c is at goal.   HTN.  Blood pressure is under good control.  HLD.  LDL cholesterol is under good control.      The patient expresses interest in and we discussed the GLP-1 class of medications for diabetes.  It is an injection.  It works by activating the GLP-1 receptor in the brain, regulating appetite and caloric intake.  It is contraindicated if you have a family history of medullary thyroid cancer or a personal history of pancreatitis.  Common reactions are nausea and constipation.   Uncommonly pancreatitis and cholecystitis can occur.  He is going to consider.          Objective   Vital Signs:  /62   Pulse 79   Ht 177.8 cm (70\")   Wt 109 kg (241 lb)   SpO2 98%   BMI 34.58 kg/m²   Physical Exam  Constitutional:       Appearance: He is well-developed.   HENT:      Head: Normocephalic and atraumatic.      Right Ear: Hearing and tympanic membrane normal.      Left Ear: Hearing and tympanic membrane normal.      Mouth/Throat:      Pharynx: No posterior oropharyngeal erythema.   Neck:      Thyroid: No thyromegaly.   Cardiovascular:      Rate and Rhythm: Normal rate and regular rhythm.      Heart sounds: Normal heart sounds. No murmur heard.  Pulmonary:      Effort: Pulmonary effort is normal.      Breath sounds: Normal breath sounds.   Abdominal:      General: There is no distension.      Palpations: Abdomen is soft.      Tenderness: There is no abdominal tenderness.   Musculoskeletal:      Cervical back: Neck supple.      Left foot: Prominent metatarsal heads present. "   Lymphadenopathy:      Cervical: No cervical adenopathy.   Skin:     General: Skin is warm and dry.   Neurological:      Mental Status: He is alert and oriented to person, place, and time.   Psychiatric:         Speech: Speech normal.         Behavior: Behavior normal.         Thought Content: Thought content normal.         Judgment: Judgment normal.         The following data was reviewed by: Yvonne Chacko MD on 09/03/2024:  Common labs          2/2/2024    09:56 8/29/2024    08:40   Common Labs   Glucose 125  133    BUN 20  25    Creatinine 1.37  1.30    Sodium 140  138    Potassium 4.4  4.3    Chloride 104  105    Calcium 9.6  9.5    Total Protein 6.6  6.5    Albumin 4.4  4.0    Total Bilirubin 0.9  1.0    Alkaline Phosphatase 64  60    AST (SGOT) 30  27    ALT (SGPT) 42  35    WBC 5.61  5.98    Hemoglobin 15.1  14.7    Hematocrit 44.2  43.9    Platelets 209  208    Total Cholesterol 96  106    Triglycerides 129  133    HDL Cholesterol 32  34    LDL Cholesterol  41  48    Hemoglobin A1C 6.10  5.90    Microalbumin, Urine  31.1    PSA  0.634                Assessment and Plan Additional age appropriate preventative wellness advice topics were discussed during today's preventative wellness exam(some topics already addressed during AWV portion of the note above):    Physical Activity: Advised cardiovascular activity 150 minutes per week as tolerated. (example brisk walk for 30 minutes, 5 days a week).              Medicare annual wellness visit, subsequent    Well adult exam    Type 2 diabetes mellitus with stage 3a chronic kidney disease, without long-term current use of insulin    Primary hypertension    Mixed hyperlipidemia     Stage 3a chronic kidney disease    No orders of the defined types were placed in this encounter.    Discussed importance of preventative care including vaccinations, age appropriate cancer screening, routine lab work, healthy diet, and active lifestyle.  Dm-2  with CKD3a.  Control is  at goal.  Kidneys are stable.  Continue attention to healthy diet.    HTN.  Control is good.  The patient is advised to continue current dosage of carvedilol and nifedipine.   HLD.  Control is good.  The patient is advised to continue current dosage of atorvastatin.            Follow Up   Return in about 6 months (around 3/3/2025) for Recheck.  Patient was given instructions and counseling regarding his condition or for health maintenance advice. Please see specific information pulled into the AVS if appropriate.

## 2024-09-03 NOTE — PATIENT INSTRUCTIONS
Medicare Wellness  Personal Prevention Plan of Service     Date of Office Visit:    Encounter Provider:  Yvonne Chacko MD  Place of Service:  [facility]  Patient Name: Sachin Almanzar  :  1950    As part of the Medicare Wellness portion of your visit today, we are providing you with this personalized preventive plan of services (PPPS). This plan is based upon recommendations of the [place] Preventive Services Task Force (USPSTF) and the Advisory Committee on Immunization Practices (ACIP).    This lists the preventive care services that should be considered, and provides dates of when you are due. Items listed as completed are up-to-date and do not require any further intervention.    Health Maintenance   Topic Date Due    RSV Vaccine - Adults (1 - 1-dose 60+ series) Never done    TDAP/TD VACCINES (2 - Td or Tdap) 2023    ANNUAL WELLNESS VISIT  2024    COVID-19 Vaccine (8 - -24 season) 2024    INFLUENZA VACCINE  2024    HEMOGLOBIN A1C  2025    DIABETIC EYE EXAM  2025    LIPID PANEL  2025    URINE MICROALBUMIN  2025    BMI FOLLOWUP  2025    COLORECTAL CANCER SCREENING  2028    HEPATITIS C SCREENING  Completed    Pneumococcal Vaccine 65+  Completed    ZOSTER VACCINE  Completed       No orders of the defined types were placed in this encounter.      No follow-ups on file.           Bilobed Transposition Flap Text: The defect edges were debeveled with a #15 scalpel blade.  Given the location of the defect and the proximity to free margins a bilobed transposition flap was deemed most appropriate.  Using a sterile surgical marker, an appropriate bilobe flap drawn around the defect.    The area thus outlined was incised deep to adipose tissue with a #15 scalpel blade.  The skin margins were undermined to an appropriate distance in all directions utilizing iris scissors.

## 2024-10-09 RX ORDER — CARVEDILOL 6.25 MG/1
TABLET ORAL
Qty: 180 TABLET | Refills: 3 | Status: SHIPPED | OUTPATIENT
Start: 2024-10-09

## 2024-10-09 NOTE — TELEPHONE ENCOUNTER
Refill Protocol Failed, Requesting Carvedilol 6.25mg BID    LOV w/ rachel 1/18/24  ANA w/ GÉNESIS Holt 1/20/25  Last Labs- 8/29/24    Please review and sign.    TEOFILO Meléndez

## 2025-01-20 ENCOUNTER — OFFICE VISIT (OUTPATIENT)
Age: 75
End: 2025-01-20
Payer: MEDICARE

## 2025-01-20 VITALS
DIASTOLIC BLOOD PRESSURE: 80 MMHG | HEIGHT: 70 IN | SYSTOLIC BLOOD PRESSURE: 138 MMHG | BODY MASS INDEX: 35.76 KG/M2 | HEART RATE: 77 BPM | WEIGHT: 249.8 LBS

## 2025-01-20 DIAGNOSIS — R60.0 PERIPHERAL EDEMA: ICD-10-CM

## 2025-01-20 DIAGNOSIS — E78.2 MIXED HYPERLIPIDEMIA: ICD-10-CM

## 2025-01-20 DIAGNOSIS — I10 PRIMARY HYPERTENSION: Primary | ICD-10-CM

## 2025-01-20 DIAGNOSIS — I47.10 PAROXYSMAL SVT (SUPRAVENTRICULAR TACHYCARDIA): ICD-10-CM

## 2025-01-20 PROCEDURE — 1160F RVW MEDS BY RX/DR IN RCRD: CPT

## 2025-01-20 PROCEDURE — 3075F SYST BP GE 130 - 139MM HG: CPT

## 2025-01-20 PROCEDURE — 99214 OFFICE O/P EST MOD 30 MIN: CPT

## 2025-01-20 PROCEDURE — 93000 ELECTROCARDIOGRAM COMPLETE: CPT

## 2025-01-20 PROCEDURE — 1159F MED LIST DOCD IN RCRD: CPT

## 2025-01-20 PROCEDURE — 3079F DIAST BP 80-89 MM HG: CPT

## 2025-01-20 RX ORDER — TADALAFIL 20 MG/1
20 TABLET ORAL DAILY PRN
COMMUNITY

## 2025-01-20 NOTE — PROGRESS NOTES
[facility] Follow Up Office Note     Encounter Date:25  Patient:Sachin Almanzar  :1950  MRN:[id number]      Chief Complaint:   Chief Complaint   Patient presents with    Follow-up         History of Presenting Illness:      Mr.Anthony SAÚL Almanzar is a 74 y.o. male who follows with Dr. Zaidi and is new to me today. He has a past medical history of  essential hypertension, hyperlipidemia, diabetes and distant history of SVT. He is here for 1 year follow up.    Patient has extensive evaluation for a syncopal episode in 2018.ECHO demonstrated normal LV function with no significant valvular abnormalities. Treadmill stress test had no evidence of ischemia. Holter monitor was relatively beign with rare atrial ectopic beats.     At last visit he was doing well with no cardiac symptoms and a mildly elevated blood pressure.     Today reports that he has been doing well the past year.  He did have 1 episode of chest pain in March that ended up being related to a shingles outbreak.  He has had no episodes of chest pain since this point.  He was ordered to have a stress test at that time but is not completed it due to shingles diagnosis.  Patient additionally denies shortness of breath and dizziness.  He reports that he has infrequent palpitations and peripheral edema associated with peripheral vascular disease.  Blood pressure is mildly elevated today.  Patient reports that he is not been as active as he should and has gained some weight in the past year.      Review of Systems:  Review of Systems   Constitutional: Negative. Positive for malaise/fatigue and weight gain.   HENT: Negative.     Eyes: Negative.    Cardiovascular:  Positive for leg swelling. Negative for chest pain, dyspnea on exertion, irregular heartbeat, orthopnea, palpitations and syncope.   Respiratory:  Negative for cough, shortness of breath and snoring.    Hematologic/Lymphatic: Negative.    Skin: Negative.     Musculoskeletal: Negative.    Gastrointestinal: Negative.    Genitourinary: Negative.    Neurological:  Negative for dizziness, headaches and light-headedness.   Psychiatric/Behavioral: Negative.         Current Outpatient Medications on File Prior to Visit   Medication Sig Dispense Refill    atorvastatin (LIPITOR) 20 MG tablet TAKE 1 TABLET BY MOUTH EVERY NIGHT 90 tablet 3    carvedilol (COREG) 6.25 MG tablet TAKE 1 TABLET BY MOUTH EVERY 12 HOURS 180 tablet 3    doxycycline (VIBRAMYCIN) 100 MG capsule Take 1 capsule by mouth Daily As Needed (FOR SKIN ISSUES).      EPINEPHrine (EPIPEN) 0.3 MG/0.3ML solution auto-injector injection Inject 0.3 mL into the appropriate muscle as directed by prescriber 1 (One) Time As Needed (anaphylasis) for up to 6 doses. 1 each 5    famotidine (PEPCID) 20 MG tablet Take 1 tablet by mouth Daily. (Patient taking differently: Take 0.5 tablets by mouth 2 (Two) Times a Day.)      fexofenadine (ALLEGRA) 180 MG tablet Take 1 tablet by mouth Daily.      Lancets (OneTouch Delica Plus [id number]) misc Daily. for testing      latanoprost (XALATAN) 0.005 % ophthalmic solution instill 1 drop in both eyes every night at bedtime      melatonin 1 MG tablet Take 3 tablets by mouth At Night As Needed for Sleep.      NIFEdipine XL (PROCARDIA XL) 30 MG 24 hr tablet TAKE 1 TABLET BY MOUTH DAILY 90 tablet 3    OneTouch Verio test strip USE ONCE DAILY FOR TYPE 2 DIABETES. DX: E11.9 100 each 5    sildenafil (VIAGRA) 100 MG tablet Take 1 tablet by mouth As Needed for Erectile Dysfunction.      tadalafil (CIALIS) 20 MG tablet Take 1 tablet by mouth Daily As Needed for Erectile Dysfunction. Currently not taking, waiting top consult with PCP .      [DISCONTINUED] cyclobenzaprine (FLEXERIL) 10 MG tablet Take 1 tablet by mouth 3 (Three) Times a Day As Needed for Muscle Spasms. (Patient not taking: Reported on 1/20/2025) 30 tablet 0     No current facility-administered medications on file prior to visit.        Allergies   Allergen Reactions    Ace Inhibitors     Shellfish-Derived Products Hives     Throat swelling, itching       Past Medical History:   Diagnosis Date    Anemia     Basal cell carcinoma 07/10/2012    Overview:  NOSE  Overview:  NOSE    Basal cell carcinoma of skin 07/10/2012    Overview:  NOSE    Bladder cancer 07/10/2012    Overview:  3/2011, Dr. Muniz, TURBT, BCG and infusion of chemo  Overview:  3/2011, Dr. Muniz, TURBT, BCG and infusion of chemo    Chronic kidney disease (CKD), stage II (mild)     Colon polyp     Diabetes mellitus     Erectile dysfunction 2012    Gastric ulcer     GERD (gastroesophageal reflux disease)     [place]'s disease 07/10/2012    Overview:  Transient acantholytic dermatosis (trunk)  Overview:  Transient acantholytic dermatosis (trunk)    Hyperlipidemia     Hypertension     Kidney stones     Low back pain     Malignant neoplasm of urinary bladder 07/10/2012    Overview:  3/2011, Dr. Muniz, TURBT, BCG and infusion of chemo    Melanoma in situ of face 06/14/2016    Overview:  Removed per Dr. Steen (derm) on 6/13/2016    Obesity     Pre-diabetes     Renal insufficiency     Sleep apnea with use of continuous positive airway pressure (CPAP)     SVT (supraventricular tachycardia) 2001    ONE TIME EPISODE       Past Surgical History:   Procedure Laterality Date    ADENOIDECTOMY      APPENDECTOMY  1954    BLADDER TUMOR EXCISION      CLAVICLE OPEN REDUCTION INTERNAL FIXATION Right 07/20/2018    Procedure: CLAVICLE OPEN REDUCTION INTERNAL FIXATION;  Surgeon: Cuate Elias MD;  Location: [facility] MAIN OR;  Service: Orthopedics    COLONOSCOPY N/A 12/12/2017    Procedure: COLONOSCOPY INTO CECUM WITH COLD BX POLYPECTOMY ;  Surgeon: Domingo Cantrell MD;  Location: [facility] ENDOSCOPY;  Service:     COLONOSCOPY N/A 9/26/2023    Procedure: COLONOSCOPY TO CECUM WITH HOT SNARE POLYPECTOMIES AND COLD POLYPECTOMY;  Surgeon: Domingo Cantrell MD;  Location: [facility] ENDOSCOPY;  Service: General;   "Laterality: N/A;  PRE OP - PERS H/O POLYPS  POST OP - COLON POLYPS    COSMETIC SURGERY      Nose - melanoma removal    EXTRACORPOREAL SHOCKWAVE LITHOTRIPSY (ESWL), STENT INSERTION/REMOVAL      MOHS SURGERY      2 bcc    TONSILLECTOMY  195       Social History     Socioeconomic History    Marital status:      Spouse name: Julita    Number of children: 1   Tobacco Use    Smoking status: Never    Smokeless tobacco: Never    Tobacco comments:     no history of smoking   Vaping Use    Vaping status: Never Used   Substance and Sexual Activity    Alcohol use: Yes     Alcohol/week: 6.0 standard drinks of alcohol     Types: 6 Cans of beer per week     Comment: 6 pk a week    Drug use: No    Sexual activity: Yes     Partners: Female     Birth control/protection: None       Family History   Problem Relation Age of Onset    Heart attack Mother          age 55    Heart disease Mother          at age of 54    Early death Father          at age of 44    Hypertension Paternal Uncle     Diabetes Maternal Grandmother     Malig Hyperthermia Neg Hx        The following portions of the patient's history were reviewed and updated as appropriate: allergies, current medications, past family history, past medical history, past social history, past surgical history and problem list.       Objective:       Vitals:    25 1303   BP: 138/80   BP Location: Left arm   Patient Position: Sitting   Cuff Size: Adult   Pulse: 77   Weight: 113 kg (249 lb 12.8 oz)   Height: 177.8 cm (70\")         Physical Exam  Vitals and nursing note reviewed.   Constitutional:       Appearance: Normal appearance. He is normal weight.   Eyes:      Extraocular Movements: Extraocular movements intact.      Pupils: Pupils are equal, round, and reactive to light.   Cardiovascular:      Rate and Rhythm: Normal rate and regular rhythm.      Chest Wall: PMI is not displaced. No thrill.      Pulses: Normal pulses.      Heart sounds: Normal heart sounds. " "  Pulmonary:      Effort: Pulmonary effort is normal.      Breath sounds: Normal breath sounds.   Musculoskeletal:      Cervical back: Normal range of motion.      Right lower leg: Edema present.      Left lower leg: Edema present.   Skin:     General: Skin is warm and dry.   Neurological:      General: No focal deficit present.      Mental Status: He is alert and oriented to person, place, and time. Mental status is at baseline.   Psychiatric:         Mood and Affect: Mood normal.         Behavior: Behavior normal.         Thought Content: Thought content normal.         Judgment: Judgment normal.               Lab Results   Component Value Date     08/29/2024     02/02/2024    K 4.3 08/29/2024    K 4.4 02/02/2024     08/29/2024     02/02/2024    CO2 24.1 08/29/2024    CO2 26.2 02/02/2024    BUN 25 (H) 08/29/2024    BUN 20 02/02/2024    CREATININE 1.30 (H) 08/29/2024    CREATININE 1.37 (H) 02/02/2024    EGFRIFNONA 53 (L) 12/14/2021    EGFRIFNONA 52 (L) 08/31/2021    EGFRIFAFRI 61 12/14/2021    EGFRIFAFRI 63 08/31/2021    GLUCOSE 133 (H) 08/29/2024    GLUCOSE 125 (H) 02/02/2024    CALCIUM 9.5 08/29/2024    CALCIUM 9.6 02/02/2024    PROTENTOTREF 6.5 08/29/2024    PROTENTOTREF 6.6 02/02/2024    ALBUMIN 4.0 08/29/2024    ALBUMIN 4.4 02/02/2024    BILITOT 1.0 08/29/2024    BILITOT 0.9 02/02/2024    AST 27 08/29/2024    AST 30 02/02/2024    ALT 35 08/29/2024    ALT 42 (H) 02/02/2024     Lab Results   Component Value Date    WBC 5.98 08/29/2024    WBC 5.61 02/02/2024    HGB 14.7 08/29/2024    HGB 15.1 02/02/2024    HCT 43.9 08/29/2024    HCT 44.2 02/02/2024    MCV 95.6 08/29/2024    MCV 94.8 02/02/2024     08/29/2024     02/02/2024     Lab Results   Component Value Date    CHOL 114 05/14/2020    TRIG 133 08/29/2024    TRIG 129 02/02/2024    HDL 34 (L) 08/29/2024    HDL 32 (L) 02/02/2024    LDL 48 08/29/2024    LDL 41 02/02/2024     No results found for: \"PROBNP\", \"BNP\"  Lab Results "   Component Value Date    TROPONINT <0.010 07/17/2018     Lab Results   Component Value Date    TSH 1.430 08/29/2024    TSH 1.710 07/10/2023           ECG 12 Lead    Date/Time: 1/20/2025 1:31 PM  Performed by: Emir Mcgrath APRN    Authorized by: Emir Mcgrath APRN  Comparison: compared with previous ECG from 1/18/2024  Similar to previous ECG  Rhythm: sinus rhythm  Rate: normal  BPM: 77  Conduction: conduction normal  ST Segments: ST segments normal  T Waves: T waves normal  Other: no other findings             Assessment:         Diagnoses and all orders for this visit:    1. Primary hypertension (Primary)  -     ECG 12 Lead    2. Mixed hyperlipidemia    3. Paroxysmal SVT (supraventricular tachycardia)  Overview:  Overview:   200 heart rate, after caffeine (no longer uses caffeine)    Overview:   200 heart rate, after caffeine (no longer uses caffeine)    Orders:  -     ECG 12 Lead    4. Peripheral edema            Plan:       Overall patient has been doing well since last visit did have 1 episode of chest pain that ended up being due to shingles outbreak.  At that time he had a cardiac workup done.  He completed all of it except a treadmill stress test.  He is not wishing to complete this at that this time.  He has not had any episodes of chest pains outside of shingles.  Denies having any shortness of breath or dyspnea on exertion.  Unfortunately has gained about 10 pounds since his last visit.  Encouraged lifestyle modifications to help with some of his fatigue and peripheral edema as well as blood pressure.  Patient reports that blood pressure at home is consistently less than 130 it is mildly elevated today in the office.    Hypertension -blood pressure mildly elevated today in the office.  Patient has had recent weight gain due to inactivity.  Will defer medication changes at this point in favor of lifestyle modifications.  Continue carvedilol 6.25 mg.     Hyperlipidemia - Continue atorvastatin.   Last LDL 48 on 8/24. AST and ALT normal on 8/24.     Paroxysmal SVT -  Continue carvedilol. Currently denies palpitations.     Peripheral Edema -patient reports history of venous insufficiency.  Recommended to continue wearing compression socks and elevating his legs while not on his feet.    Follow-up  With Dr. Zaidi in 1 year or sooner with any concerns or questions    GÉNESIS Weller  [facility]  01/20/25  14:04 EST

## 2025-02-18 DIAGNOSIS — E78.2 MIXED HYPERLIPIDEMIA: ICD-10-CM

## 2025-02-18 DIAGNOSIS — I10 PRIMARY HYPERTENSION: Primary | ICD-10-CM

## 2025-02-18 DIAGNOSIS — N18.31 TYPE 2 DIABETES MELLITUS WITH STAGE 3A CHRONIC KIDNEY DISEASE, WITHOUT LONG-TERM CURRENT USE OF INSULIN: ICD-10-CM

## 2025-02-18 DIAGNOSIS — E11.22 TYPE 2 DIABETES MELLITUS WITH STAGE 3A CHRONIC KIDNEY DISEASE, WITHOUT LONG-TERM CURRENT USE OF INSULIN: ICD-10-CM

## 2025-02-18 LAB
ALBUMIN SERPL-MCNC: 4 G/DL (ref 3.5–5.2)
ALBUMIN/GLOB SERPL: 1.5 G/DL
ALP SERPL-CCNC: 62 U/L (ref 39–117)
ALT SERPL-CCNC: 37 U/L (ref 1–41)
AST SERPL-CCNC: 29 U/L (ref 1–40)
BASOPHILS # BLD AUTO: 0.04 10*3/MM3 (ref 0–0.2)
BASOPHILS NFR BLD AUTO: 0.6 % (ref 0–1.5)
BILIRUB SERPL-MCNC: 1.1 MG/DL (ref 0–1.2)
BUN SERPL-MCNC: 20 MG/DL (ref 8–23)
BUN/CREAT SERPL: 15.7 (ref 7–25)
CALCIUM SERPL-MCNC: 10 MG/DL (ref 8.6–10.5)
CHLORIDE SERPL-SCNC: 103 MMOL/L (ref 98–107)
CHOLEST SERPL-MCNC: 111 MG/DL (ref 0–200)
CO2 SERPL-SCNC: 24.4 MMOL/L (ref 22–29)
CREAT SERPL-MCNC: 1.27 MG/DL (ref 0.76–1.27)
EGFRCR SERPLBLD CKD-EPI 2021: 58.6 ML/MIN/1.73
EOSINOPHIL # BLD AUTO: 0.2 10*3/MM3 (ref 0–0.4)
EOSINOPHIL NFR BLD AUTO: 3 % (ref 0.3–6.2)
ERYTHROCYTE [DISTWIDTH] IN BLOOD BY AUTOMATED COUNT: 12.5 % (ref 12.3–15.4)
GLOBULIN SER CALC-MCNC: 2.7 GM/DL
GLUCOSE SERPL-MCNC: 149 MG/DL (ref 65–99)
HBA1C MFR BLD: 6.2 % (ref 4.8–5.6)
HCT VFR BLD AUTO: 42.2 % (ref 37.5–51)
HDLC SERPL-MCNC: 28 MG/DL (ref 40–60)
HGB BLD-MCNC: 14.7 G/DL (ref 13–17.7)
IMM GRANULOCYTES # BLD AUTO: 0.04 10*3/MM3 (ref 0–0.05)
IMM GRANULOCYTES NFR BLD AUTO: 0.6 % (ref 0–0.5)
LDLC SERPL CALC-MCNC: 49 MG/DL (ref 0–100)
LYMPHOCYTES # BLD AUTO: 0.76 10*3/MM3 (ref 0.7–3.1)
LYMPHOCYTES NFR BLD AUTO: 11.5 % (ref 19.6–45.3)
MCH RBC QN AUTO: 32.7 PG (ref 26.6–33)
MCHC RBC AUTO-ENTMCNC: 34.8 G/DL (ref 31.5–35.7)
MCV RBC AUTO: 94 FL (ref 79–97)
MONOCYTES # BLD AUTO: 0.83 10*3/MM3 (ref 0.1–0.9)
MONOCYTES NFR BLD AUTO: 12.6 % (ref 5–12)
NEUTROPHILS # BLD AUTO: 4.74 10*3/MM3 (ref 1.7–7)
NEUTROPHILS NFR BLD AUTO: 71.7 % (ref 42.7–76)
NRBC BLD AUTO-RTO: 0 /100 WBC (ref 0–0.2)
PLATELET # BLD AUTO: 196 10*3/MM3 (ref 140–450)
POTASSIUM SERPL-SCNC: 4.7 MMOL/L (ref 3.5–5.2)
PROT SERPL-MCNC: 6.7 G/DL (ref 6–8.5)
RBC # BLD AUTO: 4.49 10*6/MM3 (ref 4.14–5.8)
SODIUM SERPL-SCNC: 138 MMOL/L (ref 136–145)
TRIGL SERPL-MCNC: 212 MG/DL (ref 0–150)
VLDLC SERPL CALC-MCNC: 34 MG/DL (ref 5–40)
WBC # BLD AUTO: 6.61 10*3/MM3 (ref 3.4–10.8)

## 2025-03-04 ENCOUNTER — OFFICE VISIT (OUTPATIENT)
Dept: INTERNAL MEDICINE | Facility: CLINIC | Age: 75
End: 2025-03-04
Payer: MEDICARE

## 2025-03-04 VITALS
RESPIRATION RATE: 14 BRPM | HEIGHT: 70 IN | WEIGHT: 247.4 LBS | SYSTOLIC BLOOD PRESSURE: 119 MMHG | DIASTOLIC BLOOD PRESSURE: 60 MMHG | OXYGEN SATURATION: 97 % | BODY MASS INDEX: 35.42 KG/M2 | HEART RATE: 81 BPM | TEMPERATURE: 98.6 F

## 2025-03-04 DIAGNOSIS — E78.2 MIXED HYPERLIPIDEMIA: ICD-10-CM

## 2025-03-04 DIAGNOSIS — N18.31 TYPE 2 DIABETES MELLITUS WITH STAGE 3A CHRONIC KIDNEY DISEASE, WITHOUT LONG-TERM CURRENT USE OF INSULIN: Primary | ICD-10-CM

## 2025-03-04 DIAGNOSIS — I10 PRIMARY HYPERTENSION: ICD-10-CM

## 2025-03-04 DIAGNOSIS — E11.22 TYPE 2 DIABETES MELLITUS WITH STAGE 3A CHRONIC KIDNEY DISEASE, WITHOUT LONG-TERM CURRENT USE OF INSULIN: Primary | ICD-10-CM

## 2025-03-04 PROCEDURE — 1126F AMNT PAIN NOTED NONE PRSNT: CPT | Performed by: INTERNAL MEDICINE

## 2025-03-04 PROCEDURE — 99214 OFFICE O/P EST MOD 30 MIN: CPT | Performed by: INTERNAL MEDICINE

## 2025-03-04 PROCEDURE — 1160F RVW MEDS BY RX/DR IN RCRD: CPT | Performed by: INTERNAL MEDICINE

## 2025-03-04 PROCEDURE — 1159F MED LIST DOCD IN RCRD: CPT | Performed by: INTERNAL MEDICINE

## 2025-03-04 PROCEDURE — 3074F SYST BP LT 130 MM HG: CPT | Performed by: INTERNAL MEDICINE

## 2025-03-04 PROCEDURE — G2211 COMPLEX E/M VISIT ADD ON: HCPCS | Performed by: INTERNAL MEDICINE

## 2025-03-04 PROCEDURE — 3078F DIAST BP <80 MM HG: CPT | Performed by: INTERNAL MEDICINE

## 2025-03-04 RX ORDER — TIRZEPATIDE 2.5 MG/.5ML
2.5 INJECTION, SOLUTION SUBCUTANEOUS WEEKLY
Qty: 2 ML | Refills: 2 | Status: SHIPPED | OUTPATIENT
Start: 2025-03-04

## 2025-03-04 NOTE — PROGRESS NOTES
Subjective     Sachin Almanzar is a 74 y.o. male who presents with   Chief Complaint   Patient presents with    Diabetes     6 Mo.     Hypertension    Hyperlipidemia       Diabetes  Pertinent negatives for hypoglycemia include no confusion, headaches, speech difficulty or tremors. Associated symptoms include polyuria. Pertinent negatives for diabetes include no polydipsia.   Hypertension  Pertinent negatives include no headaches.   Hyperlipidemia     The patient expresses interest in and we discussed the GLP-1 class of medications which include Wegovy, Zepbound and Saxenda which are FDA approved for patiets with a BMI greater than 30 or a BMI greater than 27 with comorbid conditions.   They are active ingredients in diabetes medications as well.  It is an injection.  It works by activating the GLP-1 receptor in the brain, regulating appetite and caloric intake.  It is contraindicated if you have a family history of medullary thyroid cancer or a personal history of pancreatitis.  Common reactions are nausea and constipation.   Uncommonly pancreatitis and cholecystitis can occur.       The following data was reviewed by: Yvonne Chacko MD on 03/04/2025:  Common labs          8/29/2024    08:40 2/18/2025    09:01   Common Labs   Glucose 133  149    BUN 25  20    Creatinine 1.30  1.27    Sodium 138  138    Potassium 4.3  4.7    Chloride 105  103    Calcium 9.5  10.0    Albumin 4.0  4.0    Total Bilirubin 1.0  1.1    Alkaline Phosphatase 60  62    AST (SGOT) 27  29    ALT (SGPT) 35  37    WBC 5.98  6.61    Hemoglobin 14.7  14.7    Hematocrit 43.9  42.2    Platelets 208  196    Total Cholesterol 106  111    Triglycerides 133  212    HDL Cholesterol 34  28    LDL Cholesterol  48  49    Hemoglobin A1C 5.90  6.20    Microalbumin, Urine 31.1     PSA 0.634       DM-2.  Hgb A1c is at goal.   HTN.  Blood pressure is under good control.  HLD.  LDL cholesterol is under good control.        Review of Systems   Endocrine:  Positive for polyuria. Negative for polydipsia.   Neurological:  Negative for tremors, speech difficulty and headaches.   Psychiatric/Behavioral:  Negative for confusion.        The following portions of the patient's history were reviewed and updated as appropriate: allergies, current medications and problem list.    Patient Active Problem List    Diagnosis Date Noted    History of adenomatous polyp of colon 08/15/2023     Note Last Updated: 8/15/2023     Added automatically from request for surgery [id number]      Type 2 diabetes mellitus with chronic kidney disease, without long-term current use of insulin 08/23/2021    Stage 3 chronic kidney disease 01/10/2019    Multiple renal cysts 05/07/2018    Allergy to shellfish 04/19/2017    Venous insufficiency of both lower extremities 04/21/2016    Paroxysmal SVT (supraventricular tachycardia) 03/24/2014     Note Last Updated: 7/27/2018     Overview:   200 heart rate, after caffeine (no longer uses caffeine)    Overview:   200 heart rate, after caffeine (no longer uses caffeine)      Erectile dysfunction 02/25/2013    Gastroesophageal reflux disease 02/25/2013    Allergic rhinitis 07/10/2012    Hyperlipidemia 07/10/2012    Hypertension 07/10/2012    Nephrolithiasis 07/10/2012     Note Last Updated: 7/27/2018     Overview:   Recurrent, Fall of 2016 (Dr. Muniz)  Had shockwave lithotripsy    Overview:   Recurrent, Fall of 2016 (Dr. Muniz)  Had shockwave lithotripsy      Obstructive sleep apnea on CPAP 07/10/2012    History of bladder cancer 07/10/2012     Note Last Updated: 8/26/2019     Overview:   3/2011, Dr. Muniz, TURBT, BCG and infusion of chemo         Current Outpatient Medications on File Prior to Visit   Medication Sig Dispense Refill    atorvastatin (LIPITOR) 20 MG tablet TAKE 1 TABLET BY MOUTH EVERY NIGHT 90 tablet 3    carvedilol (COREG) 6.25 MG tablet TAKE 1 TABLET BY MOUTH EVERY 12 HOURS 180 tablet 3    doxycycline (VIBRAMYCIN) 100 MG capsule Take 1 capsule  "by mouth Daily As Needed (FOR SKIN ISSUES).      EPINEPHrine (EPIPEN) 0.3 MG/0.3ML solution auto-injector injection Inject 0.3 mL into the appropriate muscle as directed by prescriber 1 (One) Time As Needed (anaphylasis) for up to 6 doses. 1 each 5    famotidine (PEPCID) 20 MG tablet Take 1 tablet by mouth Daily. (Patient taking differently: Take 0.5 tablets by mouth 2 (Two) Times a Day.)      fexofenadine (ALLEGRA) 180 MG tablet Take 1 tablet by mouth Daily.      Lancets (OneTouch Delica Plus [id number]) misc Daily. for testing      latanoprost (XALATAN) 0.005 % ophthalmic solution instill 1 drop in both eyes every night at bedtime      melatonin 1 MG tablet Take 3 tablets by mouth At Night As Needed for Sleep.      NIFEdipine XL (PROCARDIA XL) 30 MG 24 hr tablet TAKE 1 TABLET BY MOUTH DAILY 90 tablet 3    OneTouch Verio test strip USE ONCE DAILY FOR TYPE 2 DIABETES. DX: E11.9 100 each 5    sildenafil (VIAGRA) 100 MG tablet Take 1 tablet by mouth As Needed for Erectile Dysfunction.      tadalafil (CIALIS) 20 MG tablet Take 1 tablet by mouth Daily As Needed for Erectile Dysfunction. Currently not taking, waiting top consult with PCP .       No current facility-administered medications on file prior to visit.       Objective     /60 (BP Location: Left arm, Patient Position: Sitting, Cuff Size: Large Adult)   Pulse 81   Temp 98.6 °F (37 °C) (Oral)   Resp 14   Ht 177.8 cm (70\")   Wt 112 kg (247 lb 6.4 oz)   SpO2 97%   BMI 35.50 kg/m²     Physical Exam  Constitutional:       Appearance: He is well-developed.   HENT:      Head: Atraumatic.   Cardiovascular:      Rate and Rhythm: Normal rate and regular rhythm.      Heart sounds: Normal heart sounds.   Pulmonary:      Effort: Pulmonary effort is normal.      Breath sounds: Normal breath sounds.   Skin:     General: Skin is warm and dry.   Neurological:      Mental Status: He is alert and oriented to person, place, and time.         Assessment & Plan "   Diagnoses and all orders for this visit:    1. Type 2 diabetes mellitus with stage 3a chronic kidney disease, without long-term current use of insulin (Primary)    2. Primary hypertension    3. Mixed hyperlipidemia    Other orders  -     Tirzepatide (Mounjaro) 2.5 MG/0.5ML solution auto-injector; Inject 2.5 mg under the skin into the appropriate area as directed 1 (One) Time Per Week.  Dispense: 2 mL; Refill: 2        Discussion    DM-2 with obesity.  Trial of Mounjaro.  Discussed with the patient onset on action and the potential side effects including as discussed above.  The patient will let me know of any side effects from the medication.      HTN.  Control is good.  The patient is advised to continue current dosage of current medications.    HLD. Control is good.  The patient is advised to continue current dosage of atorvastatin.           Future Appointments   Date Time Provider Department Center   5/20/2025  8:40 AM [facility]   5/28/2025  9:45 AM Yvonne Chacko MD [facility]   9/2/2025  8:20 AM [facility]   9/8/2025  8:15 AM Yvonne Chacko MD [facility]   1/22/2026  3:40 PM Shorty Zaidi MD [id number] None         Answers submitted by the patient for this visit:  Diabetes Questionnaire (Submitted on 3/3/2025)  Chief Complaint: Diabetes problem  Below 70: never

## 2025-04-04 RX ORDER — ATORVASTATIN CALCIUM 20 MG/1
20 TABLET, FILM COATED ORAL NIGHTLY
Qty: 90 TABLET | Refills: 3 | Status: SHIPPED | OUTPATIENT
Start: 2025-04-04

## 2025-04-07 RX ORDER — NIFEDIPINE 30 MG/1
30 TABLET, EXTENDED RELEASE ORAL DAILY
Qty: 90 TABLET | Refills: 3 | Status: SHIPPED | OUTPATIENT
Start: 2025-04-07 | End: 2025-04-11

## 2025-04-07 NOTE — TELEPHONE ENCOUNTER
Lv 1/20/25 ej  Nxt 1/22/26 cs  Labs 2/18/25 cbc,cmp,lipid    Return in about 1 year (around 1/20/2026) for Dr. Zaidi.

## 2025-04-11 RX ORDER — NIFEDIPINE 30 MG/1
30 TABLET, EXTENDED RELEASE ORAL DAILY
Qty: 90 TABLET | Refills: 3 | Status: SHIPPED | OUTPATIENT
Start: 2025-04-11

## 2025-05-19 DIAGNOSIS — N18.31 TYPE 2 DIABETES MELLITUS WITH STAGE 3A CHRONIC KIDNEY DISEASE, WITHOUT LONG-TERM CURRENT USE OF INSULIN: ICD-10-CM

## 2025-05-19 DIAGNOSIS — E78.2 MIXED HYPERLIPIDEMIA: ICD-10-CM

## 2025-05-19 DIAGNOSIS — E11.22 TYPE 2 DIABETES MELLITUS WITH STAGE 3A CHRONIC KIDNEY DISEASE, WITHOUT LONG-TERM CURRENT USE OF INSULIN: ICD-10-CM

## 2025-05-19 DIAGNOSIS — I10 PRIMARY HYPERTENSION: Primary | ICD-10-CM

## 2025-05-21 LAB
ALBUMIN SERPL-MCNC: 4.1 G/DL (ref 3.5–5.2)
ALBUMIN/GLOB SERPL: 1.9 G/DL
ALP SERPL-CCNC: 66 U/L (ref 39–117)
ALT SERPL-CCNC: 33 U/L (ref 1–41)
AST SERPL-CCNC: 30 U/L (ref 1–40)
BASOPHILS # BLD AUTO: 0.05 10*3/MM3 (ref 0–0.2)
BASOPHILS NFR BLD AUTO: 0.9 % (ref 0–1.5)
BILIRUB SERPL-MCNC: 0.7 MG/DL (ref 0–1.2)
BUN SERPL-MCNC: 21 MG/DL (ref 8–23)
BUN/CREAT SERPL: 16.3 (ref 7–25)
CALCIUM SERPL-MCNC: 9.6 MG/DL (ref 8.6–10.5)
CHLORIDE SERPL-SCNC: 105 MMOL/L (ref 98–107)
CHOLEST SERPL-MCNC: 98 MG/DL (ref 0–200)
CO2 SERPL-SCNC: 25 MMOL/L (ref 22–29)
CREAT SERPL-MCNC: 1.29 MG/DL (ref 0.76–1.27)
EGFRCR SERPLBLD CKD-EPI 2021: 57.5 ML/MIN/1.73
EOSINOPHIL # BLD AUTO: 0.16 10*3/MM3 (ref 0–0.4)
EOSINOPHIL NFR BLD AUTO: 2.7 % (ref 0.3–6.2)
ERYTHROCYTE [DISTWIDTH] IN BLOOD BY AUTOMATED COUNT: 13.2 % (ref 12.3–15.4)
GLOBULIN SER CALC-MCNC: 2.2 GM/DL
GLUCOSE SERPL-MCNC: 102 MG/DL (ref 65–99)
HBA1C MFR BLD: 5.6 % (ref 4.8–5.6)
HCT VFR BLD AUTO: 42.9 % (ref 37.5–51)
HDLC SERPL-MCNC: 29 MG/DL (ref 40–60)
HGB BLD-MCNC: 14.8 G/DL (ref 13–17.7)
IMM GRANULOCYTES # BLD AUTO: 0.03 10*3/MM3 (ref 0–0.05)
IMM GRANULOCYTES NFR BLD AUTO: 0.5 % (ref 0–0.5)
LDLC SERPL CALC-MCNC: 45 MG/DL (ref 0–100)
LYMPHOCYTES # BLD AUTO: 0.7 10*3/MM3 (ref 0.7–3.1)
LYMPHOCYTES NFR BLD AUTO: 12 % (ref 19.6–45.3)
MCH RBC QN AUTO: 32.7 PG (ref 26.6–33)
MCHC RBC AUTO-ENTMCNC: 34.5 G/DL (ref 31.5–35.7)
MCV RBC AUTO: 94.7 FL (ref 79–97)
MONOCYTES # BLD AUTO: 0.63 10*3/MM3 (ref 0.1–0.9)
MONOCYTES NFR BLD AUTO: 10.8 % (ref 5–12)
NEUTROPHILS # BLD AUTO: 4.25 10*3/MM3 (ref 1.7–7)
NEUTROPHILS NFR BLD AUTO: 73.1 % (ref 42.7–76)
NRBC BLD AUTO-RTO: 0 /100 WBC (ref 0–0.2)
PLATELET # BLD AUTO: 199 10*3/MM3 (ref 140–450)
POTASSIUM SERPL-SCNC: 4.2 MMOL/L (ref 3.5–5.2)
PROT SERPL-MCNC: 6.3 G/DL (ref 6–8.5)
RBC # BLD AUTO: 4.53 10*6/MM3 (ref 4.14–5.8)
SODIUM SERPL-SCNC: 140 MMOL/L (ref 136–145)
TRIGL SERPL-MCNC: 136 MG/DL (ref 0–150)
VLDLC SERPL CALC-MCNC: 24 MG/DL (ref 5–40)
WBC # BLD AUTO: 5.82 10*3/MM3 (ref 3.4–10.8)

## 2025-05-28 ENCOUNTER — OFFICE VISIT (OUTPATIENT)
Dept: INTERNAL MEDICINE | Facility: CLINIC | Age: 75
End: 2025-05-28
Payer: MEDICARE

## 2025-05-28 VITALS
WEIGHT: 236 LBS | HEART RATE: 82 BPM | BODY MASS INDEX: 33.79 KG/M2 | OXYGEN SATURATION: 98 % | SYSTOLIC BLOOD PRESSURE: 114 MMHG | HEIGHT: 70 IN | DIASTOLIC BLOOD PRESSURE: 72 MMHG

## 2025-05-28 DIAGNOSIS — I10 PRIMARY HYPERTENSION: ICD-10-CM

## 2025-05-28 DIAGNOSIS — E78.2 MIXED HYPERLIPIDEMIA: ICD-10-CM

## 2025-05-28 DIAGNOSIS — E11.22 TYPE 2 DIABETES MELLITUS WITH STAGE 3A CHRONIC KIDNEY DISEASE, WITHOUT LONG-TERM CURRENT USE OF INSULIN: Primary | ICD-10-CM

## 2025-05-28 DIAGNOSIS — N18.31 TYPE 2 DIABETES MELLITUS WITH STAGE 3A CHRONIC KIDNEY DISEASE, WITHOUT LONG-TERM CURRENT USE OF INSULIN: Primary | ICD-10-CM

## 2025-05-28 DIAGNOSIS — N18.31 STAGE 3A CHRONIC KIDNEY DISEASE: ICD-10-CM

## 2025-05-28 RX ORDER — TIRZEPATIDE 2.5 MG/.5ML
2.5 INJECTION, SOLUTION SUBCUTANEOUS WEEKLY
Qty: 6 ML | Refills: 3 | Status: SHIPPED | OUTPATIENT
Start: 2025-05-28

## 2025-05-28 NOTE — PROGRESS NOTES
Subjective     Sachin Almanzar is a 74 y.o. male who presents with   Chief Complaint   Patient presents with    Diabetes    Hyperlipidemia    Hypertension       History of Present Illness     The following data was reviewed by: Yvonne Chacko MD on 05/28/2025:  Common labs          8/29/2024    08:40 2/18/2025    09:01 5/20/2025    08:31   Common Labs   Glucose 133  149  102    BUN 25  20  21    Creatinine 1.30  1.27  1.29    Sodium 138  138  140    Potassium 4.3  4.7  4.2    Chloride 105  103  105    Calcium 9.5  10.0  9.6    Albumin 4.0  4.0  4.1    Total Bilirubin 1.0  1.1  0.7    Alkaline Phosphatase 60  62  66    AST (SGOT) 27  29  30    ALT (SGPT) 35  37  33    WBC 5.98  6.61  5.82    Hemoglobin 14.7  14.7  14.8    Hematocrit 43.9  42.2  42.9    Platelets 208  196  199    Total Cholesterol 106  111  98    Triglycerides 133  212  136    HDL Cholesterol 34  28  29    LDL Cholesterol  48  49  45    Hemoglobin A1C 5.90  6.20  5.60    Microalbumin, Urine 31.1      PSA 0.634        DM-2.  Hgb A1c is at goal.   Kidneys are stable.   HTN.  Blood pressure is under good control.  HLD.  LDL cholesterol is under good control.        Review of Systems   Endocrine: Positive for polyuria. Negative for polydipsia.   Neurological:  Negative for tremors, speech difficulty and headaches.   Psychiatric/Behavioral:  Negative for confusion.        The following portions of the patient's history were reviewed and updated as appropriate: allergies, current medications and problem list.    Patient Active Problem List    Diagnosis Date Noted    History of adenomatous polyp of colon 08/15/2023     Note Last Updated: 8/15/2023     Added automatically from request for surgery [id number]      Type 2 diabetes mellitus with chronic kidney disease, without long-term current use of insulin 08/23/2021    Stage 3 chronic kidney disease 01/10/2019    Multiple renal cysts 05/07/2018    Allergy to shellfish 04/19/2017    Venous insufficiency of  both lower extremities 04/21/2016    Paroxysmal SVT (supraventricular tachycardia) 03/24/2014     Note Last Updated: 7/27/2018     Overview:   200 heart rate, after caffeine (no longer uses caffeine)    Overview:   200 heart rate, after caffeine (no longer uses caffeine)      Erectile dysfunction 02/25/2013    Gastroesophageal reflux disease 02/25/2013    Allergic rhinitis 07/10/2012    Hyperlipidemia 07/10/2012    Hypertension 07/10/2012    Nephrolithiasis 07/10/2012     Note Last Updated: 7/27/2018     Overview:   Recurrent, Fall of 2016 (Dr. Muniz)  Had shockwave lithotripsy    Overview:   Recurrent, Fall of 2016 (Dr. Muniz)  Had shockwave lithotripsy      Obstructive sleep apnea on CPAP 07/10/2012    History of bladder cancer 07/10/2012     Note Last Updated: 8/26/2019     Overview:   3/2011, Dr. Muniz, TURBT, BCG and infusion of chemo         Current Outpatient Medications on File Prior to Visit   Medication Sig Dispense Refill    atorvastatin (LIPITOR) 20 MG tablet TAKE 1 TABLET BY MOUTH EVERY NIGHT 90 tablet 3    carvedilol (COREG) 6.25 MG tablet TAKE 1 TABLET BY MOUTH EVERY 12 HOURS 180 tablet 3    doxycycline (VIBRAMYCIN) 100 MG capsule Take 1 capsule by mouth Daily As Needed (FOR SKIN ISSUES).      EPINEPHrine (EPIPEN) 0.3 MG/0.3ML solution auto-injector injection Inject 0.3 mL into the appropriate muscle as directed by prescriber 1 (One) Time As Needed (anaphylasis) for up to 6 doses. 1 each 5    famotidine (PEPCID) 20 MG tablet Take 1 tablet by mouth Daily. (Patient taking differently: Take 0.5 tablets by mouth 2 (Two) Times a Day.)      fexofenadine (ALLEGRA) 180 MG tablet Take 1 tablet by mouth Daily.      Lancets (OneTouch Delica Plus [id number]) misc Daily. for testing      latanoprost (XALATAN) 0.005 % ophthalmic solution instill 1 drop in both eyes every night at bedtime      melatonin 1 MG tablet Take 3 tablets by mouth At Night As Needed for Sleep.      NIFEdipine XL (PROCARDIA XL) 30 MG 24 hr  "tablet TAKE 1 TABLET BY MOUTH DAILY 90 tablet 3    OneTouch Verio test strip USE ONCE DAILY FOR TYPE 2 DIABETES. DX: E11.9 100 each 5    sildenafil (VIAGRA) 100 MG tablet Take 1 tablet by mouth As Needed for Erectile Dysfunction.      tadalafil (CIALIS) 20 MG tablet Take 1 tablet by mouth Daily As Needed for Erectile Dysfunction. Currently not taking, waiting top consult with PCP .      [DISCONTINUED] Tirzepatide (Mounjaro) 2.5 MG/0.5ML solution auto-injector Inject 2.5 mg under the skin into the appropriate area as directed 1 (One) Time Per Week. 2 mL 2     No current facility-administered medications on file prior to visit.       Objective     /72   Pulse 82   Ht 177.8 cm (70\")   Wt 107 kg (236 lb)   SpO2 98%   BMI 33.86 kg/m²     Physical Exam  Constitutional:       Appearance: He is well-developed.   HENT:      Head: Atraumatic.   Pulmonary:      Effort: Pulmonary effort is normal.   Neurological:      Mental Status: He is alert and oriented to person, place, and time.         Assessment & Plan   Diagnoses and all orders for this visit:    1. Type 2 diabetes mellitus with stage 3a chronic kidney disease, without long-term current use of insulin (Primary)    2. Primary hypertension    3. Mixed hyperlipidemia    4. Stage 3a chronic kidney disease    Other orders  -     Tirzepatide (Mounjaro) 2.5 MG/0.5ML solution auto-injector; Inject 2.5 mg under the skin into the appropriate area as directed 1 (One) Time Per Week.  Dispense: 6 mL; Refill: 3        Discussion    DM-2 with [id number].  Control is good and kidney numbers are stable.  The patient is advised to continue current dosage of mounjaro.    HTN.  Control is good.  The patient is advised to continue current dosage of current medications.    HLD. Control is good.  The patient is advised to continue current dosage of atorvastatin.         Reviewed and updated 5/28/2025  Future Appointments   Date Time Provider Department Center   9/2/2025  8:20 AM " [facility]   9/8/2025  8:15 AM Yvonne Chacko MD [facility]   1/22/2026  3:40 PM Shorty Zaidi MD [id number]         Answers submitted by the patient for this visit:  Diabetes Questionnaire (Submitted on 5/21/2025)  Chief Complaint: Diabetes problem  Below 70: never

## 2025-08-29 DIAGNOSIS — Z12.5 SCREENING PSA (PROSTATE SPECIFIC ANTIGEN): ICD-10-CM

## 2025-08-29 DIAGNOSIS — I10 PRIMARY HYPERTENSION: Primary | ICD-10-CM

## 2025-08-29 DIAGNOSIS — N18.31 TYPE 2 DIABETES MELLITUS WITH STAGE 3A CHRONIC KIDNEY DISEASE, WITHOUT LONG-TERM CURRENT USE OF INSULIN: ICD-10-CM

## 2025-08-29 DIAGNOSIS — E11.22 TYPE 2 DIABETES MELLITUS WITH STAGE 3A CHRONIC KIDNEY DISEASE, WITHOUT LONG-TERM CURRENT USE OF INSULIN: ICD-10-CM

## 2025-08-29 DIAGNOSIS — E78.2 MIXED HYPERLIPIDEMIA: ICD-10-CM

## (undated) DEVICE — DRSNG WND GZ PAD BORDERED 4X8IN STRL

## (undated) DEVICE — CANN NASL CO2 TRULINK W/O2 A/

## (undated) DEVICE — APPL CHLORAPREP W/TINT 26ML ORNG

## (undated) DEVICE — PATIENT RETURN ELECTRODE, SINGLE-USE, CONTACT QUALITY MONITORING, ADULT, WITH 9FT CORD, FOR PATIENTS WEIGING OVER 33LBS. (15KG): Brand: MEGADYNE

## (undated) DEVICE — PK SHLDR OPN 40

## (undated) DEVICE — Device: Brand: DEFENDO AIR/WATER/SUCTION AND BIOPSY VALVE

## (undated) DEVICE — ADHS SKIN DERMABOND TOP ADVANCED

## (undated) DEVICE — GLV SURG BIOGEL LTX PF 8 1/2

## (undated) DEVICE — SUT VIC 3/0 CTI 36IN J944H

## (undated) DEVICE — ANTIBACTERIAL UNDYED BRAIDED (POLYGLACTIN 910), SYNTHETIC ABSORBABLE SUTURE: Brand: COATED VICRYL

## (undated) DEVICE — KT ORCA ORCAPOD DISP STRL

## (undated) DEVICE — SUT MNCRYL 3/0 PS2 18IN MCP497G

## (undated) DEVICE — TUBING, SUCTION, 1/4" X 10', STRAIGHT: Brand: MEDLINE

## (undated) DEVICE — DRP C/ARM 41X74IN

## (undated) DEVICE — ADAPT CLN BIOGUARD AIR/H2O DISP

## (undated) DEVICE — SINGLE-USE BIOPSY FORCEPS: Brand: RADIAL JAW 4

## (undated) DEVICE — TBG 02 CRUSH RESIST LF CLR 7FT

## (undated) DEVICE — THE TORRENT IRRIGATION SCOPE CONNECTOR IS USED WITH THE TORRENT IRRIGATION TUBING TO PROVIDE IRRIGATION FLUIDS SUCH AS STERILE WATER DURING GASTROINTESTINAL ENDOSCOPIC PROCEDURES WHEN USED IN CONJUNCTION WITH AN IRRIGATION PUMP (OR ELECTROSURGICAL UNIT).: Brand: TORRENT

## (undated) DEVICE — SNAR POLYP SENSATION STDOVL 27 240 BX40

## (undated) DEVICE — SENSR O2 OXIMAX FNGR A/ 18IN NONSTR

## (undated) DEVICE — THE SINGLE USE ETRAP – POLYP TRAP IS USED FOR SUCTION RETRIEVAL OF ENDOSCOPICALLY REMOVED POLYPS.: Brand: ETRAP

## (undated) DEVICE — Device

## (undated) DEVICE — GLV SURG TRIUMPH CLASSIC PF LTX 8.5 STRL

## (undated) DEVICE — CANN O2 ETCO2 FITS ALL CONN CO2 SMPL A/ 7IN DISP LF